# Patient Record
Sex: MALE | Race: WHITE | NOT HISPANIC OR LATINO | URBAN - NONMETROPOLITAN AREA
[De-identification: names, ages, dates, MRNs, and addresses within clinical notes are randomized per-mention and may not be internally consistent; named-entity substitution may affect disease eponyms.]

---

## 2018-09-19 RX ORDER — ALBUTEROL SULFATE 90 UG/1
2 AEROSOL, METERED RESPIRATORY (INHALATION) EVERY 4 HOURS PRN
COMMUNITY

## 2018-09-19 RX ORDER — HYDROCHLOROTHIAZIDE 12.5 MG/1
12.5 CAPSULE, GELATIN COATED ORAL DAILY
COMMUNITY
End: 2021-03-11

## 2018-09-19 RX ORDER — ACETAMINOPHEN 500 MG
500 TABLET ORAL EVERY 6 HOURS PRN
COMMUNITY

## 2018-09-26 ENCOUNTER — ANESTHESIA (OUTPATIENT)
Dept: GASTROENTEROLOGY | Facility: HOSPITAL | Age: 58
End: 2018-09-26

## 2018-09-26 ENCOUNTER — HOSPITAL ENCOUNTER (OUTPATIENT)
Facility: HOSPITAL | Age: 58
Setting detail: HOSPITAL OUTPATIENT SURGERY
Discharge: HOME | End: 2018-09-26
Attending: INTERNAL MEDICINE | Admitting: INTERNAL MEDICINE

## 2018-09-26 ENCOUNTER — ANESTHESIA EVENT (OUTPATIENT)
Dept: GASTROENTEROLOGY | Facility: HOSPITAL | Age: 58
End: 2018-09-26

## 2018-09-26 VITALS
DIASTOLIC BLOOD PRESSURE: 74 MMHG | HEART RATE: 88 BPM | HEIGHT: 69 IN | OXYGEN SATURATION: 95 % | RESPIRATION RATE: 18 BRPM | TEMPERATURE: 96.9 F | SYSTOLIC BLOOD PRESSURE: 110 MMHG | BODY MASS INDEX: 24.62 KG/M2 | WEIGHT: 166.23 LBS

## 2018-09-26 DIAGNOSIS — Z12.11 ENCOUNTER FOR SCREENING COLONOSCOPY: ICD-10-CM

## 2018-09-26 PROCEDURE — 88305 TISSUE EXAM BY PATHOLOGIST: CPT | Performed by: INTERNAL MEDICINE

## 2018-09-26 PROCEDURE — 88305 TISSUE EXAM BY PATHOLOGIST: CPT | Performed by: PATHOLOGY

## 2018-09-26 PROCEDURE — 25010000002 PROPOFOL 10 MG/ML EMULSION: Performed by: NURSE ANESTHETIST, CERTIFIED REGISTERED

## 2018-09-26 PROCEDURE — 25010000002 MIDAZOLAM PER 1 MG: Performed by: NURSE ANESTHETIST, CERTIFIED REGISTERED

## 2018-09-26 RX ORDER — PROMETHAZINE HYDROCHLORIDE 25 MG/1
25 TABLET ORAL ONCE AS NEEDED
Status: DISCONTINUED | OUTPATIENT
Start: 2018-09-26 | End: 2018-09-26 | Stop reason: HOSPADM

## 2018-09-26 RX ORDER — PROMETHAZINE HYDROCHLORIDE 25 MG/1
25 SUPPOSITORY RECTAL ONCE AS NEEDED
Status: DISCONTINUED | OUTPATIENT
Start: 2018-09-26 | End: 2018-09-26 | Stop reason: HOSPADM

## 2018-09-26 RX ORDER — ONDANSETRON 2 MG/ML
4 INJECTION INTRAMUSCULAR; INTRAVENOUS ONCE AS NEEDED
Status: DISCONTINUED | OUTPATIENT
Start: 2018-09-26 | End: 2018-09-26 | Stop reason: HOSPADM

## 2018-09-26 RX ORDER — MIDAZOLAM HYDROCHLORIDE 1 MG/ML
INJECTION INTRAMUSCULAR; INTRAVENOUS AS NEEDED
Status: DISCONTINUED | OUTPATIENT
Start: 2018-09-26 | End: 2018-09-26 | Stop reason: SURG

## 2018-09-26 RX ORDER — DEXAMETHASONE SODIUM PHOSPHATE 4 MG/ML
8 INJECTION, SOLUTION INTRA-ARTICULAR; INTRALESIONAL; INTRAMUSCULAR; INTRAVENOUS; SOFT TISSUE ONCE AS NEEDED
Status: DISCONTINUED | OUTPATIENT
Start: 2018-09-26 | End: 2018-09-26 | Stop reason: HOSPADM

## 2018-09-26 RX ORDER — PROMETHAZINE HYDROCHLORIDE 25 MG/ML
12.5 INJECTION, SOLUTION INTRAMUSCULAR; INTRAVENOUS ONCE AS NEEDED
Status: DISCONTINUED | OUTPATIENT
Start: 2018-09-26 | End: 2018-09-26 | Stop reason: HOSPADM

## 2018-09-26 RX ORDER — PROPOFOL 10 MG/ML
VIAL (ML) INTRAVENOUS AS NEEDED
Status: DISCONTINUED | OUTPATIENT
Start: 2018-09-26 | End: 2018-09-26 | Stop reason: SURG

## 2018-09-26 RX ORDER — DEXTROSE AND SODIUM CHLORIDE 5; .45 G/100ML; G/100ML
20 INJECTION, SOLUTION INTRAVENOUS CONTINUOUS
Status: DISCONTINUED | OUTPATIENT
Start: 2018-09-26 | End: 2018-09-26 | Stop reason: HOSPADM

## 2018-09-26 RX ADMIN — PROPOFOL 70 MG: 10 INJECTION, EMULSION INTRAVENOUS at 14:21

## 2018-09-26 RX ADMIN — DEXTROSE AND SODIUM CHLORIDE 20 ML/HR: 5; 450 INJECTION, SOLUTION INTRAVENOUS at 13:56

## 2018-09-26 RX ADMIN — MIDAZOLAM HYDROCHLORIDE 2 MG: 2 INJECTION, SOLUTION INTRAMUSCULAR; INTRAVENOUS at 14:09

## 2018-09-26 RX ADMIN — PROPOFOL 70 MG: 10 INJECTION, EMULSION INTRAVENOUS at 14:12

## 2018-09-26 RX ADMIN — PROPOFOL 70 MG: 10 INJECTION, EMULSION INTRAVENOUS at 14:17

## 2018-09-26 NOTE — ANESTHESIA POSTPROCEDURE EVALUATION
Patient: Jeff Werner    Procedure Summary     Date:  09/26/18 Room / Location:  [facility] ENDOSCOPY 2 / [facility] ENDOSCOPY    Anesthesia Start:  1411 Anesthesia Stop:  1431    Procedure:  COLONOSCOPY (N/A ) Diagnosis:       Encounter for screening colonoscopy      (Encounter for screening colonoscopy [Z12.11])    Surgeon:  Jovanny Landis DO Provider:  Yuko Flowers CRNA    Anesthesia Type:  MAC ASA Status:  2          Anesthesia Type: MAC  Last vitals  BP   147/82 (09/26/18 1350)   Temp   96.6 °F (35.9 °C) (09/26/18 1350)   Pulse   93 (09/26/18 1350)   Resp   16 (09/26/18 1350)     SpO2   97 % (09/26/18 1350)     Post Anesthesia Care and Evaluation    Patient location during evaluation: bedside  Level of consciousness: sleepy but conscious  Pain score: 0  Pain management: adequate  Airway patency: patent  Anesthetic complications: No anesthetic complications  PONV Status: none  Cardiovascular status: acceptable  Respiratory status: acceptable  Hydration status: acceptable

## 2018-09-26 NOTE — ANESTHESIA PREPROCEDURE EVALUATION
Anesthesia Evaluation     Patient summary reviewed and Nursing notes reviewed   NPO Solid Status: > 8 hours  NPO Liquid Status: > 6 hours           Airway   Mallampati: II  TM distance: >3 FB  Neck ROM: full  No difficulty expected  Dental - normal exam     Pulmonary - normal exam   (+) a smoker Current Abstained day of surgery, COPD, asthma,   Cardiovascular - normal exam    (+) hypertension well controlled less than 2 medications, hyperlipidemia,       Neuro/Psych  GI/Hepatic/Renal/Endo      Musculoskeletal     Abdominal  - normal exam   Substance History      OB/GYN          Other                        Anesthesia Plan    ASA 2     MAC     intravenous induction   Anesthetic plan, all risks, benefits, and alternatives have been provided, discussed and informed consent has been obtained with: patient.

## 2018-09-27 LAB
LAB AP CASE REPORT: NORMAL
PATH REPORT.FINAL DX SPEC: NORMAL
PATH REPORT.GROSS SPEC: NORMAL

## 2019-10-14 ENCOUNTER — TRANSCRIBE ORDERS (OUTPATIENT)
Dept: ORTHOPEDIC SURGERY | Facility: CLINIC | Age: 59
End: 2019-10-14

## 2019-10-14 DIAGNOSIS — M25.561 CHRONIC PAIN OF RIGHT KNEE: Primary | ICD-10-CM

## 2019-10-14 DIAGNOSIS — G89.29 CHRONIC PAIN OF RIGHT KNEE: Primary | ICD-10-CM

## 2019-10-28 DIAGNOSIS — M25.561 RIGHT KNEE PAIN, UNSPECIFIED CHRONICITY: Primary | ICD-10-CM

## 2019-10-29 ENCOUNTER — OFFICE VISIT (OUTPATIENT)
Dept: ORTHOPEDIC SURGERY | Facility: CLINIC | Age: 59
End: 2019-10-29

## 2019-10-29 VITALS — BODY MASS INDEX: 25.92 KG/M2 | HEIGHT: 69 IN | WEIGHT: 175 LBS

## 2019-10-29 DIAGNOSIS — M51.36 DEGENERATIVE DISC DISEASE, LUMBAR: ICD-10-CM

## 2019-10-29 DIAGNOSIS — M54.42 CHRONIC MIDLINE LOW BACK PAIN WITH LEFT-SIDED SCIATICA: ICD-10-CM

## 2019-10-29 DIAGNOSIS — M23.91 INTERNAL DERANGEMENT OF RIGHT KNEE: ICD-10-CM

## 2019-10-29 DIAGNOSIS — G89.29 CHRONIC PAIN OF RIGHT KNEE: Primary | ICD-10-CM

## 2019-10-29 DIAGNOSIS — G89.29 CHRONIC MIDLINE LOW BACK PAIN WITH LEFT-SIDED SCIATICA: ICD-10-CM

## 2019-10-29 DIAGNOSIS — M25.561 MECHANICAL PAIN OF RIGHT KNEE: ICD-10-CM

## 2019-10-29 DIAGNOSIS — R20.0 NUMBNESS AND TINGLING OF LEFT LEG: ICD-10-CM

## 2019-10-29 DIAGNOSIS — M25.561 CHRONIC PAIN OF RIGHT KNEE: Primary | ICD-10-CM

## 2019-10-29 DIAGNOSIS — R20.2 NUMBNESS AND TINGLING OF LEFT LEG: ICD-10-CM

## 2019-10-29 PROCEDURE — 99204 OFFICE O/P NEW MOD 45 MIN: CPT | Performed by: NURSE PRACTITIONER

## 2019-10-31 PROBLEM — M25.561 MECHANICAL PAIN OF RIGHT KNEE: Status: ACTIVE | Noted: 2019-10-31

## 2019-10-31 PROBLEM — M54.50 CHRONIC MIDLINE LOW BACK PAIN WITHOUT SCIATICA: Status: ACTIVE | Noted: 2019-10-31

## 2019-10-31 PROBLEM — G89.29 CHRONIC MIDLINE LOW BACK PAIN WITHOUT SCIATICA: Status: ACTIVE | Noted: 2019-10-31

## 2019-10-31 PROBLEM — R20.2 NUMBNESS AND TINGLING OF LEFT LEG: Status: ACTIVE | Noted: 2019-10-31

## 2019-10-31 PROBLEM — M23.91 INTERNAL DERANGEMENT OF RIGHT KNEE: Status: ACTIVE | Noted: 2019-10-31

## 2019-10-31 PROBLEM — R20.0 NUMBNESS AND TINGLING OF LEFT LEG: Status: ACTIVE | Noted: 2019-10-31

## 2019-11-25 ENCOUNTER — TELEPHONE (OUTPATIENT)
Dept: ORTHOPEDIC SURGERY | Facility: CLINIC | Age: 59
End: 2019-11-25

## 2019-11-25 DIAGNOSIS — R20.2 NUMBNESS AND TINGLING OF LEFT LEG: ICD-10-CM

## 2019-11-25 DIAGNOSIS — M25.561 RIGHT KNEE PAIN, UNSPECIFIED CHRONICITY: ICD-10-CM

## 2019-11-25 DIAGNOSIS — R20.0 NUMBNESS AND TINGLING OF LEFT LEG: ICD-10-CM

## 2019-11-25 DIAGNOSIS — M51.36 DEGENERATIVE DISC DISEASE, LUMBAR: ICD-10-CM

## 2019-11-25 DIAGNOSIS — M54.42 CHRONIC MIDLINE LOW BACK PAIN WITH LEFT-SIDED SCIATICA: ICD-10-CM

## 2019-11-25 DIAGNOSIS — G89.29 CHRONIC PAIN OF RIGHT KNEE: ICD-10-CM

## 2019-11-25 DIAGNOSIS — M25.561 CHRONIC PAIN OF RIGHT KNEE: ICD-10-CM

## 2019-11-25 DIAGNOSIS — G89.29 CHRONIC MIDLINE LOW BACK PAIN WITH LEFT-SIDED SCIATICA: ICD-10-CM

## 2019-11-25 DIAGNOSIS — M25.561 MECHANICAL PAIN OF RIGHT KNEE: ICD-10-CM

## 2019-11-25 DIAGNOSIS — M23.91 INTERNAL DERANGEMENT OF RIGHT KNEE: Primary | ICD-10-CM

## 2019-11-25 NOTE — TELEPHONE ENCOUNTER
----- Message from CRISTOBAL Bliss sent at 11/25/2019 11:17 AM CST -----  Regarding: physcial therapy  Please check with patient and see if he wants to be referred to physical therapy in regards to his right knee and/or his chronic low back pain.  His insurance has denied the MRIs but typically they will approve them when I resubmit them if he has done some therapy.  Thanks.

## 2019-12-10 ENCOUNTER — HOSPITAL ENCOUNTER (OUTPATIENT)
Dept: PHYSICAL THERAPY | Facility: HOSPITAL | Age: 59
Setting detail: THERAPIES SERIES
Discharge: HOME | End: 2019-12-10

## 2019-12-10 DIAGNOSIS — M25.561 CHRONIC PAIN OF RIGHT KNEE: ICD-10-CM

## 2019-12-10 DIAGNOSIS — M25.561 RIGHT KNEE PAIN, UNSPECIFIED CHRONICITY: ICD-10-CM

## 2019-12-10 DIAGNOSIS — M23.91 INTERNAL DERANGEMENT OF RIGHT KNEE: ICD-10-CM

## 2019-12-10 DIAGNOSIS — M25.561 MECHANICAL PAIN OF RIGHT KNEE: ICD-10-CM

## 2019-12-10 DIAGNOSIS — G89.29 CHRONIC PAIN OF RIGHT KNEE: ICD-10-CM

## 2019-12-10 DIAGNOSIS — R20.2 NUMBNESS AND TINGLING OF LEFT LEG: ICD-10-CM

## 2019-12-10 DIAGNOSIS — R20.0 NUMBNESS AND TINGLING OF LEFT LEG: ICD-10-CM

## 2019-12-10 DIAGNOSIS — M54.42 CHRONIC MIDLINE LOW BACK PAIN WITH LEFT-SIDED SCIATICA: ICD-10-CM

## 2019-12-10 DIAGNOSIS — G89.29 CHRONIC MIDLINE LOW BACK PAIN WITH LEFT-SIDED SCIATICA: ICD-10-CM

## 2019-12-10 DIAGNOSIS — M51.36 DEGENERATIVE DISC DISEASE, LUMBAR: ICD-10-CM

## 2019-12-10 PROCEDURE — 97161 PT EVAL LOW COMPLEX 20 MIN: CPT | Performed by: PHYSICAL THERAPIST

## 2019-12-10 NOTE — THERAPY EVALUATION
Outpatient Physical Therapy Ortho Initial Evaluation  [facility]     Patient Name: Jeff Werner  : 1960  MRN: [id number]  Today's Date: 12/10/2019      Visit Date: 12/10/2019  Visit   Return to MD: [facility]  Re-cert date: 19  Patient Active Problem List   Diagnosis   • Right knee pain   • Degenerative disc disease, lumbar   • Internal derangement of right knee   • Chronic midline low back pain without sciatica   • Mechanical pain of right knee   • Numbness and tingling of left leg        Past Medical History:   Diagnosis Date   • Arthritis of back    • Asthma    • COPD (chronic obstructive pulmonary disease) (CMS/HCC)    • Degenerative disc disease, lumbar    • Elevated cholesterol    • Hypertension    • Lumbosacral disc disease     history of disc hernations in the lumbar spine        Past Surgical History:   Procedure Laterality Date   • COLONOSCOPY N/A 2018    Procedure: COLONOSCOPY;  Surgeon: Jovanny Landis DO;  Location: [facility] ENDOSCOPY;  Service: Gastroenterology       Visit Dx:     ICD-10-CM ICD-9-CM   1. Internal derangement of right knee M23.91 717.9   2. Degenerative disc disease, lumbar M51.36 722.52   3. Chronic pain of right knee M25.561 719.46    G89.29 338.29   4. Chronic midline low back pain with left-sided sciatica M54.42 724.2    G89.29 724.3     338.29   5. Mechanical pain of right knee M25.561 719.46   6. Numbness and tingling of left leg R20.0 782.0    R20.2    7. Right knee pain, unspecified chronicity M25.561 719.46       Medications (Admitted on 12/10/2019)     acetaminophen (TYLENOL) 500 MG tablet     albuterol (PROVENTIL HFA;VENTOLIN HFA) 108 (90 Base) MCG/ACT inhaler     hydrochlorothiazide (MICROZIDE) 12.5 MG capsule      Allergies       CarvedilolAnaphylaxis   LosartanAnaphylaxis   LisinoprilAngioedema           PT Ortho     Row Name 12/10/19 1100       Subjective Comments    Subjective Comments  60 yo male with right knee pain for the past 5 months.  Sudden onset standing up one day and felt a pop in that knee. Had popping/crepitus for 2 weeks, unable to squat or bend down onto the floor at the time. Improving since onset. 2 steroid injections given over a short period of time (2 weeks). Had a bad reaction to multiple steroid injections given over a short period of time. X-ray evidence of osteophyte along superior patella region and loose calcified body along posterior compartment of R knee. Aggravating factors: rising after prolonged sitting, extensive walking, squatting, working on knees at work (works at [employer]), climbing stairs-down>up; Easing factors: tylenol   -BS       Precautions and Contraindications    Precautions/Limitations  no known precautions/limitations  -BS       Subjective Pain    Able to rate subjective pain?  yes  -BS    Pre-Treatment Pain Level  1  -BS    Subjective Pain Comment  1-9/10 intermittent R knee pain; deep squat performed with 4/10 R knee pain noted.  -BS       Posture/Observations    Posture/Observations Comments  -TTP along R knee, no edema noted, no varus/valgus deformity  -BS       Special Tests/Palpation    Special Tests/Palpation  Knee  -BS       Knee Special Tests    Anterior drawer (ACL lesion)  Right:;Negative  -BS    Posterior drawer (PCL lesion)  Right:;Negative  -BS    Valgus stress (MCL lesion)  Right:;Negative  -BS    Varus stress (LCL lesion)  Right:;Negative  -BS    Apley’s compression test (meniscal lesion vs OA)  Right:;Positive  -BS    Jose’s test (meniscal lesion)  Right:;Negative  -BS    Patellar grind test (chondromalacia patella)  Right:;Negative  -BS       General ROM    GENERAL ROM COMMENTS  AROM: R knee 0-140 deg  L knee 0-140 deg  -BS       MMT (Manual Muscle Testing)    General MMT Comments  MMT: B LE's 5/5 except 4+/5 B hip ext  -BS       Sensation    Light Touch  Partial deficits in the LLE paresthesia along L lateral thigh  -BS       Flexibility    Flexibility Tested?  Lower Extremity  -BS        Lower Extremity Flexibility    Hamstrings  Bilateral:;Moderately limited R  52 deg, L 58 deg  -BS    Hip Flexors  Bilateral:;WNL  -BS    Quadriceps  Bilateral:;Mildly limited  -BS    ITB  Left:;Severely limited;Right:;WNL  -BS      User Key  (r) = Recorded By, (t) = Taken By, (c) = Cosigned By    Initials Name Provider Type    Aj Moreno, PT Physical Therapist                            PT OP Goals     Row Name 12/10/19 1100          Time Calculation    PT Goal Re-Cert Due Date  12/31/19  -BS       User Key  (r) = Recorded By, (t) = Taken By, (c) = Cosigned By    Initials Name Provider Type    Aj Moreno, PT Physical Therapist          PT Assessment/Plan     Row Name 12/10/19 1111          PT Assessment    Functional Limitations  Impaired gait;Performance in work activities  -BS     Impairments  Pain;Muscle strength  -BS     Assessment Comments  Acute R knee pain due to suspected meniscal tear. Positive special test Apley compression test finding and popping/crepitus with wt bearing. Instability noted with eccentric SLR test.   -BS     Please refer to paper survey for additional self-reported information  Yes  -BS     Rehab Potential  Good  -BS     Patient/caregiver participated in establishment of treatment plan and goals  Yes  -BS     Patient would benefit from skilled therapy intervention  Yes  -BS        PT Plan    PT Frequency  2x/week  -BS     Predicted Duration of Therapy Intervention (Therapy Eval)  2-3 weeks  -BS     Planned CPT's?  PT EVAL LOW COMPLEXITY: 35439;PT THER PROC EA 15 MIN: 46563;PT MANUAL THERAPY EA 15 MIN: 79414;PT RE-EVAL: 97219;PT ELECTRICAL STIM UNATTEND: ;PT HOT/COLD PACK WC NONMCARE: 46153;PT THER SUPP EA 15 MIN  -BS     Physical Therapy Interventions (Optional Details)  balance training;home exercise program;joint mobilization;manual therapy techniques;modalities;patient/family education;ROM (Range of Motion);stair training;strengthening;stretching  -BS     PT Plan  Comments  Address glut max weakness, eccentric loading to R quads. Address hamstring and quad tightness.    -BS       User Key  (r) = Recorded By, (t) = Taken By, (c) = Cosigned By    Initials Name Provider Type    Aj Moreno, PT Physical Therapist            OP Exercises     Row Name 12/10/19 1100             Subjective Comments    Subjective Comments  58 yo male with right knee pain for the past 5 months. Sudden onset standing up one day and felt a pop in that knee. Had popping/crepitus for 2 weeks, unable to squat or bend down onto the floor at the time. Improving since onset. 2 steroid injections given over a short period of time (2 weeks). Had a bad reaction to multiple steroid injections given over a short period of time. X-ray evidence of osteophyte along superior patella region and loose calcified body along posterior compartment of R knee. Aggravating factors: rising after prolonged sitting, extensive walking, squatting, working on knees at work (works at [employer]), climbing stairs-down>up; Easing factors: tylenol   -BS         Subjective Pain    Able to rate subjective pain?  yes  -BS      Pre-Treatment Pain Level  1  -BS      Post-Treatment Pain Level  1  -BS      Subjective Pain Comment  1-9/10 intermittent R knee pain; deep squat performed with 4/10 R knee pain noted.  -BS        User Key  (r) = Recorded By, (t) = Taken By, (c) = Cosigned By    Initials Name Provider Type    Aj Moreno, PT Physical Therapist                        Outcome Measure Options: Lower Extremity Functional Scale (LEFS)  Lower Extremity Functional Index  Any of your usual work, housework or school activities: A little bit of difficulty  Your usual hobbies, recreational or sporting activities: No difficulty  Getting into or out of the bath: No difficulty  Walking between rooms: No difficulty  Putting on your shoes or socks: A little bit of difficulty  Squatting: Moderate difficulty  Lifting an object, like a bag of  groceries from the floor: A little bit of difficulty  Performing light activities around your home: No difficulty  Performing heavy activities around your home: Moderate difficulty  Getting into or out of a car: No difficulty  Walking 2 blocks: Quite a bit of difficulty  Walking a mile: Extreme difficulty or unable to perform activity  Going up or down 10 stairs (about 1 flight of stairs): Moderate difficulty  Standing for 1 hour: Quite a bit of difficulty  Sitting for 1 hour: Moderate difficulty  Running on even ground: Extreme difficulty or unable to perform activity  Running on uneven ground: Extreme difficulty or unable to perform activity  Making sharp turns while running fast: Extreme difficulty or unable to perform activity  Hopping: Extreme difficulty or unable to perform activity  Rolling over in bed: No difficulty  Total: 43      Time Calculation:     Start Time: 1111  Stop Time: 1140  Time Calculation (min): 29 min  Total Timed Code Minutes- PT: 29 minute(s)     Therapy Charges for Today     Code Description Service Date Service Provider Modifiers Qty    39316375548 HC PT EVAL LOW COMPLEXITY 2 12/10/2019 Aj Mayes, PT GP 1          PT G-Codes  Outcome Measure Options: Lower Extremity Functional Scale (LEFS)  Total: 43         Aj Mayes, PT  12/10/2019

## 2019-12-18 ENCOUNTER — HOSPITAL ENCOUNTER (OUTPATIENT)
Dept: PHYSICAL THERAPY | Facility: HOSPITAL | Age: 59
Setting detail: THERAPIES SERIES
Discharge: HOME | End: 2019-12-18

## 2019-12-18 DIAGNOSIS — M23.91 INTERNAL DERANGEMENT OF RIGHT KNEE: Primary | ICD-10-CM

## 2019-12-18 DIAGNOSIS — M25.561 CHRONIC PAIN OF RIGHT KNEE: ICD-10-CM

## 2019-12-18 DIAGNOSIS — G89.29 CHRONIC PAIN OF RIGHT KNEE: ICD-10-CM

## 2019-12-18 PROCEDURE — 97110 THERAPEUTIC EXERCISES: CPT | Performed by: PHYSICAL THERAPIST

## 2019-12-18 NOTE — THERAPY TREATMENT NOTE
Outpatient Physical Therapy Ortho Treatment Note  [facility]     Patient Name: Jeff Werner  : 1960  MRN: [id number]  Today's Date: 2019      Visit Date: 2019  Visit 2/2 (7 authorized)  Return to MD: [facility]  Re-cert date: 19    Visit Dx:    ICD-10-CM ICD-9-CM   1. Internal derangement of right knee M23.91 717.9   2. Chronic pain of right knee M25.561 719.46    G89.29 338.29       Patient Active Problem List   Diagnosis   • Right knee pain   • Degenerative disc disease, lumbar   • Internal derangement of right knee   • Chronic midline low back pain without sciatica   • Mechanical pain of right knee   • Numbness and tingling of left leg        Past Medical History:   Diagnosis Date   • Arthritis of back    • Asthma    • COPD (chronic obstructive pulmonary disease) (CMS/HCC)    • Degenerative disc disease, lumbar    • Elevated cholesterol    • Hypertension    • Lumbosacral disc disease     history of disc hernations in the lumbar spine        Past Surgical History:   Procedure Laterality Date   • COLONOSCOPY N/A 2018    Procedure: COLONOSCOPY;  Surgeon: Jovanny Landis DO;  Location: [facility] ENDOSCOPY;  Service: Gastroenterology       PT Ortho     Row Name 19 1300       Precautions and Contraindications    Precautions/Limitations  no known precautions/limitations  -BS       Subjective Pain    Able to rate subjective pain?  yes  -BS    Pre-Treatment Pain Level  2  -BS    Post-Treatment Pain Level  4  -BS      User Key  (r) = Recorded By, (t) = Taken By, (c) = Cosigned By    Initials Name Provider Type    Aj Moreno, PT Physical Therapist                      PT Assessment/Plan     Row Name 19 1302          PT Assessment    Assessment Comments  Good tolerance to quad stability training. Slight increase in R knee pain post tx.  -BS        PT Plan    PT Frequency  2x/week  -BS     Predicted Duration of Therapy Intervention (Therapy Eval)  2-3 weeks  -BS     PT  "Plan Comments  attempt ball toss w/ SLS.  -BS       User Key  (r) = Recorded By, (t) = Taken By, (c) = Cosigned By    Initials Name Provider Type    BS Aj Mayes, PT Physical Therapist            OP Exercises     Row Name 12/18/19 1300             Subjective Comments    Subjective Comments  pt reports irritated for a few days after the PT consult.   -BS         Subjective Pain    Able to rate subjective pain?  yes  -BS      Pre-Treatment Pain Level  2  -BS      Post-Treatment Pain Level  4  -BS         Exercise 1    Exercise Name 1  Pro II, level 3  -BS      Time 1  10'  -BS         Exercise 2    Exercise Name 2  standing HS S  -BS      Sets 2  1  -BS      Reps 2  2  -BS      Time 2  30\" hold  -BS      Additional Comments  R only  -BS         Exercise 3    Exercise Name 3  step down, 4\"  -BS      Sets 3  1  -BS      Reps 3  10  -BS         Exercise 4    Exercise Name 4  SLS on firm surface  -BS      Sets 4  7-10\" on R, >15\" on L  -BS         Exercise 5    Exercise Name 5  standing HR/TR  -BS      Sets 5  1  -BS      Reps 5  20  -BS         Exercise 6    Exercise Name 6  standing quad S  -BS      Sets 6  1  -BS      Reps 6  2  -BS      Time 6  30\" hold  -BS      Additional Comments  R only  -BS         Exercise 7    Exercise Name 7  R SLR  -BS      Sets 7  2  -BS      Reps 7  10  -BS      Time 7  5\" hold  -BS         Exercise 8    Exercise Name 8  bridges w/ iso b hip add  -BS      Sets 8  2  -BS      Reps 8  10  -BS        User Key  (r) = Recorded By, (t) = Taken By, (c) = Cosigned By    Initials Name Provider Type    Aj Moreno, PT Physical Therapist                       PT OP Goals     Row Name 12/18/19 1300          PT Short Term Goals    STG Date to Achieve  12/31/19  -BS     STG 1  Able to perform 10 right SLR's with good eccentric control of R quads  -BS     STG 1 Progress  Ongoing  -BS     STG 2  Improve R hip extension MMT to 5/5  -BS     STG 2 Progress  Ongoing  -BS     STG 3  Able to stand for " prolonged periods at work and descend stairs with minimal to no R knee pain  -BS     STG 3 Progress  Ongoing  -BS     STG 4  Improve right hamstrings flexibility to >/=70 deg with 90/90 SLR test  -BS     STG 4 Progress  New  -BS        Time Calculation    PT Goal Re-Cert Due Date  12/31/19  -BS       User Key  (r) = Recorded By, (t) = Taken By, (c) = Cosigned By    Initials Name Provider Type    Aj Moreno, PT Physical Therapist          Therapy Education  Education Details: HEP: see flow sheet  Given: HEP  Program: New  How Provided: Verbal, Demonstration, Written  Provided to: Patient  Level of Understanding: Verbalized, Demonstrated              Time Calculation:   Start Time: 1302  Stop Time: 1341  Time Calculation (min): 39 min  Total Timed Code Minutes- PT: 39 minute(s)  Therapy Charges for Today     Code Description Service Date Service Provider Modifiers Qty    24480099273 HC PT THER PROC EA 15 MIN 12/18/2019 Aj Mayes, PT GP 3                    Aj Mayes, PT  12/18/2019

## 2019-12-31 ENCOUNTER — HOSPITAL ENCOUNTER (OUTPATIENT)
Dept: PHYSICAL THERAPY | Facility: HOSPITAL | Age: 59
Setting detail: THERAPIES SERIES
Discharge: HOME | End: 2019-12-31

## 2019-12-31 DIAGNOSIS — G89.29 CHRONIC PAIN OF RIGHT KNEE: ICD-10-CM

## 2019-12-31 DIAGNOSIS — M23.91 INTERNAL DERANGEMENT OF RIGHT KNEE: Primary | ICD-10-CM

## 2019-12-31 DIAGNOSIS — M25.561 CHRONIC PAIN OF RIGHT KNEE: ICD-10-CM

## 2019-12-31 PROCEDURE — 97110 THERAPEUTIC EXERCISES: CPT

## 2020-01-07 ENCOUNTER — HOSPITAL ENCOUNTER (OUTPATIENT)
Dept: PHYSICAL THERAPY | Facility: HOSPITAL | Age: 60
Setting detail: THERAPIES SERIES
Discharge: HOME | End: 2020-01-07

## 2020-01-07 DIAGNOSIS — M23.91 INTERNAL DERANGEMENT OF RIGHT KNEE: Primary | ICD-10-CM

## 2020-01-07 DIAGNOSIS — G89.29 CHRONIC PAIN OF RIGHT KNEE: ICD-10-CM

## 2020-01-07 DIAGNOSIS — M25.561 CHRONIC PAIN OF RIGHT KNEE: ICD-10-CM

## 2020-01-07 PROCEDURE — 97110 THERAPEUTIC EXERCISES: CPT | Performed by: PHYSICAL THERAPIST

## 2020-01-07 NOTE — THERAPY PROGRESS REPORT/RE-CERT
Outpatient Physical Therapy Ortho Progress Note  [facility]     Patient Name: Jeff Werner  : 1960  MRN: [id number]  Today's Date: 2020      Visit Date: 2020  Subjective Improvement:        10%  Visit Number:                            Recert Date:                            20  MD Visit:                                  ?  Total Approved Visits:             6 visits 24 units from 19  To 20  Patient Active Problem List   Diagnosis   • Right knee pain   • Degenerative disc disease, lumbar   • Internal derangement of right knee   • Chronic midline low back pain without sciatica   • Mechanical pain of right knee   • Numbness and tingling of left leg        Past Medical History:   Diagnosis Date   • Arthritis of back    • Asthma    • COPD (chronic obstructive pulmonary disease) (CMS/HCC)    • Degenerative disc disease, lumbar    • Elevated cholesterol    • Hypertension    • Lumbosacral disc disease     history of disc hernations in the lumbar spine        Past Surgical History:   Procedure Laterality Date   • COLONOSCOPY N/A 2018    Procedure: COLONOSCOPY;  Surgeon: Jovanny Landis DO;  Location: [facility] ENDOSCOPY;  Service: Gastroenterology       Visit Dx:     ICD-10-CM ICD-9-CM   1. Internal derangement of right knee M23.91 717.9   2. Chronic pain of right knee M25.561 719.46    G89.29 338.29             PT Ortho     Row Name 20 0900       MMT (Manual Muscle Testing)    General MMT Comments  R hip ext 5/5 L hip ext 5/5   -BS       Lower Extremity Flexibility    Hamstrings  Right:;Moderately limited R 60 deg  -BS    Row Name 20 0851       Subjective Comments    Subjective Comments  10% improvement overall, no real pain mostly pops in the knee daily. Able to work in a kneeling position a bit better since starting PT.   -BS       Precautions and Contraindications    Precautions/Limitations  no known precautions/limitations  -BS       Subjective Pain     Able to rate subjective pain?  yes  -BS    Pre-Treatment Pain Level  0  -BS    Post-Treatment Pain Level  0  -BS      User Key  (r) = Recorded By, (t) = Taken By, (c) = Cosigned By    Initials Name Provider Type    Aj Moreno, PT Physical Therapist                      Therapy Education  Education Details: hip hikes, fwd al HOWE  Given: HEP  Program: New  How Provided: Verbal, Demonstration  Provided to: Patient  Level of Understanding: Verbalized, Demonstrated, Teach back education performed     PT OP Goals     Row Name 01/07/20 0851          PT Short Term Goals    STG Date to Achieve  12/31/19  -BS     STG 1  Able to perform 10 right SLR's with good eccentric control of R quads  -BS     STG 1 Progress  Met  -BS     STG 2  Improve R hip extension MMT to 5/5  -BS     STG 2 Progress  Met  -BS     STG 3  Able to stand for prolonged periods at work and descend stairs with minimal to no R knee pain  -BS     STG 3 Progress  Ongoing  -BS     STG 4  Improve right hamstrings flexibility to >/=70 deg with 90/90 SLR test  -BS     STG 4 Progress  Ongoing;Progressing  -BS        Time Calculation    PT Goal Re-Cert Due Date  01/28/20  -BS       User Key  (r) = Recorded By, (t) = Taken By, (c) = Cosigned By    Initials Name Provider Type    Aj Moreno, PT Physical Therapist          PT Assessment/Plan     Row Name 01/07/20 0851          PT Assessment    Functional Limitations  Impaired gait;Performance in work activities  -BS     Impairments  Pain;Muscle strength  -BS     Assessment Comments  Minimal gains toward R knee goals due partly by poor compliance with HEP and attendance.  -BS     Please refer to paper survey for additional self-reported information  Yes  -BS     Rehab Potential  Good  -BS     Patient/caregiver participated in establishment of treatment plan and goals  Yes  -BS     Patient would benefit from skilled therapy intervention  Yes  -BS        PT Plan    PT Frequency  2x/week  -BS     Predicted  "Duration of Therapy Intervention (Therapy Eval)  3 weeks  -BS     Planned CPT's?  PT RE-EVAL: 30262;PT THER PROC EA 15 MIN: 51580;PT THER ACT EA 15 MIN: 26090;PT MANUAL THERAPY EA 15 MIN: 63597;PT NEUROMUSC RE-EDUCATION EA 15 MIN: 41979;PT ELECTRICAL STIM UNATTEND: ;PT HOT/COLD PACK WC NONMCARE: 26835;PT THER SUPP EA 15 MIN  -BS     Physical Therapy Interventions (Optional Details)  balance training;home exercise program;joint mobilization;manual therapy techniques;modalities;neuromuscular re-education;patient/family education;ROM (Range of Motion);strengthening;stretching  -BS     PT Plan Comments  further address eccentric control of knee and ankle stability.   -BS       User Key  (r) = Recorded By, (t) = Taken By, (c) = Cosigned By    Initials Name Provider Type    Aj Moreno, PT Physical Therapist            OP Exercises     Row Name 01/07/20 0851             Subjective Comments    Subjective Comments  10% improvement overall, no real pain mostly pops in the knee daily. Able to work in a kneeling position a bit better since starting PT.   -BS         Subjective Pain    Able to rate subjective pain?  yes  -BS      Pre-Treatment Pain Level  0  -BS      Post-Treatment Pain Level  0  -BS         Exercise 1    Exercise Name 1  Pro II level 3 seat 12  -BS      Time 1  10 min  -BS         Exercise 2    Exercise Name 2  Incline stretch  -BS      Reps 2  3  -BS      Time 2  30  -BS         Exercise 3    Exercise Name 3  R HS stretch standing  -BS      Reps 3  3  -BS      Time 3  30  -BS         Exercise 4    Exercise Name 4  eccentric SLR's  -BS      Sets 4  1  -BS      Reps 4  10  -BS      Additional Comments  R only  -BS         Exercise 5    Exercise Name 5  SLS  -BS      Reps 5  3  -BS      Time 5  15 sec  -BS         Exercise 6    Exercise Name 6  eccentric step downs, 6\"  -BS      Sets 6  1  -BS      Reps 6  10  -BS         Exercise 7    Exercise Name 7  hip hikes, 6\"  -BS      Sets 7  2  -BS      Reps " 7  10  -BS        User Key  (r) = Recorded By, (t) = Taken By, (c) = Cosigned By    Initials Name Provider Type    Aj Moreno, PT Physical Therapist                        Outcome Measure Options: Lower Extremity Functional Scale (LEFS)  Lower Extremity Functional Index  Any of your usual work, housework or school activities: A little bit of difficulty  Your usual hobbies, recreational or sporting activities: Moderate difficulty  Getting into or out of the bath: No difficulty  Walking between rooms: A little bit of difficulty  Putting on your shoes or socks: A little bit of difficulty  Squatting: Quite a bit of difficulty  Lifting an object, like a bag of groceries from the floor: A little bit of difficulty  Performing light activities around your home: A little bit of difficulty  Performing heavy activities around your home: Moderate difficulty  Getting into or out of a car: No difficulty  Walking 2 blocks: Quite a bit of difficulty  Walking a mile: Extreme difficulty or unable to perform activity  Going up or down 10 stairs (about 1 flight of stairs): Moderate difficulty  Standing for 1 hour: Moderate difficulty  Sitting for 1 hour: Moderate difficulty  Running on even ground: Extreme difficulty or unable to perform activity  Running on uneven ground: Extreme difficulty or unable to perform activity  Making sharp turns while running fast: Extreme difficulty or unable to perform activity  Hopping: Quite a bit of difficulty  Rolling over in bed: No difficulty  Total: 40      Time Calculation:     Start Time: 0851  Stop Time: 0930  Time Calculation (min): 39 min  Total Timed Code Minutes- PT: 39 minute(s)     Therapy Charges for Today     Code Description Service Date Service Provider Modifiers Qty    36193583181 HC PT THER PROC EA 15 MIN 1/7/2020 Aj Mayes, PT GP 3          PT G-Codes  Outcome Measure Options: Lower Extremity Functional Scale (LEFS)  Total: 40         Aj Mayes, PT  1/7/2020

## 2020-01-08 ENCOUNTER — HOSPITAL ENCOUNTER (OUTPATIENT)
Dept: PHYSICAL THERAPY | Facility: HOSPITAL | Age: 60
Setting detail: THERAPIES SERIES
Discharge: HOME | End: 2020-01-08

## 2020-01-08 DIAGNOSIS — G89.29 CHRONIC PAIN OF RIGHT KNEE: ICD-10-CM

## 2020-01-08 DIAGNOSIS — M23.91 INTERNAL DERANGEMENT OF RIGHT KNEE: Primary | ICD-10-CM

## 2020-01-08 DIAGNOSIS — M25.561 CHRONIC PAIN OF RIGHT KNEE: ICD-10-CM

## 2020-01-08 PROCEDURE — 97110 THERAPEUTIC EXERCISES: CPT

## 2020-01-08 NOTE — THERAPY TREATMENT NOTE
Outpatient Physical Therapy Ortho Treatment Note  [facility]     Patient Name: Jeff Werner  : 1960  MRN: [id number]  Today's Date: 2020      Visit Date: 2020    Visit Dx:    ICD-10-CM ICD-9-CM   1. Internal derangement of right knee M23.91 717.9   2. Chronic pain of right knee M25.561 719.46    G89.29 338.29       Patient Active Problem List   Diagnosis   • Right knee pain   • Degenerative disc disease, lumbar   • Internal derangement of right knee   • Chronic midline low back pain without sciatica   • Mechanical pain of right knee   • Numbness and tingling of left leg        Past Medical History:   Diagnosis Date   • Arthritis of back    • Asthma    • COPD (chronic obstructive pulmonary disease) (CMS/HCC)    • Degenerative disc disease, lumbar    • Elevated cholesterol    • Hypertension    • Lumbosacral disc disease     history of disc hernations in the lumbar spine        Past Surgical History:   Procedure Laterality Date   • COLONOSCOPY N/A 2018    Procedure: COLONOSCOPY;  Surgeon: Jovanny Landis DO;  Location: [facility] ENDOSCOPY;  Service: Gastroenterology       PT Ortho     Row Name 20 0800       Subjective Comments    Subjective Comments  Pt denies pain this date.  -TL       Precautions and Contraindications    Precautions/Limitations  no known precautions/limitations  -TL       Subjective Pain    Able to rate subjective pain?  yes  -TL    Pre-Treatment Pain Level  0  -TL    Post-Treatment Pain Level  0  -TL      User Key  (r) = Recorded By, (t) = Taken By, (c) = Cosigned By    Initials Name Provider Type    TL Ines Akins PTA Physical Therapy Assistant                      PT Assessment/Plan     Row Name 20 0900          PT Assessment    Assessment Comments  Pt denies pain . Pt tight in hamstrings and ITband . Pt tolerated new sit to stands with ball well and SLS stance on airex pad with balance issues. Pt has met short termg oals 1-3.  -TL        PT  Plan    PT Frequency  2x/week  -TL     Predicted Duration of Therapy Intervention (Therapy Eval)  3 weeks  -TL     PT Plan Comments  stretch ITB next visit, add increase reps with wall squats  -TL       User Key  (r) = Recorded By, (t) = Taken By, (c) = Cosigned By    Initials Name Provider Type    TL Ines Akins PTA Physical Therapy Assistant            OP Exercises     Row Name 01/08/20 0800             Subjective Comments    Subjective Comments  Pt denies pain this date.  -TL         Subjective Pain    Able to rate subjective pain?  yes  -TL      Pre-Treatment Pain Level  0  -TL      Post-Treatment Pain Level  0  -TL         Exercise 1    Exercise Name 1  pro ll level 4 seat 12  -TL      Time 1  10 mins  -TL         Exercise 2    Exercise Name 2  Incline stretch  -TL      Reps 2  2  -TL      Time 2  30 sec hold  -TL         Exercise 3    Exercise Name 3  both St Ham S  -TL      Reps 3  3  -TL      Time 3  30 sec hold  -TL         Exercise 4    Exercise Name 4  step down ecc 6'  -TL      Sets 4  2  -TL      Reps 4  10  -TL         Exercise 5    Exercise Name 5  step up lateral 6'  -TL      Sets 5  2  -TL      Reps 5  10  -TL         Exercise 6    Exercise Name 6  wall squats  -TL      Sets 6  1  -TL      Reps 6  10  -TL         Exercise 7    Exercise Name 7  sit to stands with hip add using ball  -TL      Sets 7  2  -TL      Reps 7  10  -TL         Exercise 8    Exercise Name 8  SLS on airex  -TL      Reps 8  5 reps  -TL      Additional Comments  7-11 sec hold  -TL         Exercise 9    Exercise Name 9  supine ham S manual right leg only  -TL      Reps 9  3  -TL      Time 9  30 sec hold  -TL        User Key  (r) = Recorded By, (t) = Taken By, (c) = Cosigned By    Initials Name Provider Type    TL Ines Akins PTA Physical Therapy Assistant                       PT OP Goals     Row Name 01/08/20 0800          PT Short Term Goals    STG Date to Achieve  12/31/19  -TL     STG 1  Able to perform 10 right  SLR's with good eccentric control of R quads  -TL     STG 1 Progress  Met  -TL     STG 2  Improve R hip extension MMT to 5/5  -TL     STG 2 Progress  Met  -TL     STG 3  Able to stand for prolonged periods at work and descend stairs with minimal to no R knee pain  -TL     STG 3 Progress  Met  -TL     STG 4  Improve right hamstrings flexibility to >/=70 deg with 90/90 SLR test  -TL     STG 4 Progress  Ongoing;Progressing  -TL     STG 5  Able to kneel at work for extended periods with minimal to no R knee pain  -TL     STG 5 Progress  New  -TL        Time Calculation    PT Goal Re-Cert Due Date  01/28/20  -TL       User Key  (r) = Recorded By, (t) = Taken By, (c) = Cosigned By    Initials Name Provider Type    Ines Giraldo PTA Physical Therapy Assistant          Therapy Education  Given: HEP, Symptoms/condition management  Program: Reinforced  How Provided: Verbal, Demonstration  Provided to: Patient  Level of Understanding: Teach back education performed, Verbalized, Demonstrated              Time Calculation:   Start Time: 0846  Stop Time: 0931  Time Calculation (min): 45 min  Total Timed Code Minutes- PT: 45 minute(s)  Therapy Charges for Today     Code Description Service Date Service Provider Modifiers Qty    66406736200 HC PT THER PROC EA 15 MIN 1/8/2020 Ines Akins PTA GP 3                    Ines Akins PTA  1/8/2020

## 2020-01-14 ENCOUNTER — HOSPITAL ENCOUNTER (OUTPATIENT)
Dept: PHYSICAL THERAPY | Facility: HOSPITAL | Age: 60
Setting detail: THERAPIES SERIES
End: 2020-01-14

## 2020-01-15 ENCOUNTER — APPOINTMENT (OUTPATIENT)
Dept: PHYSICAL THERAPY | Facility: HOSPITAL | Age: 60
End: 2020-01-15

## 2020-01-21 ENCOUNTER — HOSPITAL ENCOUNTER (OUTPATIENT)
Dept: PHYSICAL THERAPY | Facility: HOSPITAL | Age: 60
Setting detail: THERAPIES SERIES
Discharge: HOME | End: 2020-01-21

## 2020-01-21 DIAGNOSIS — G89.29 CHRONIC PAIN OF RIGHT KNEE: ICD-10-CM

## 2020-01-21 DIAGNOSIS — M25.561 CHRONIC PAIN OF RIGHT KNEE: ICD-10-CM

## 2020-01-21 DIAGNOSIS — M23.91 INTERNAL DERANGEMENT OF RIGHT KNEE: Primary | ICD-10-CM

## 2020-01-21 PROCEDURE — 97110 THERAPEUTIC EXERCISES: CPT

## 2020-01-21 NOTE — THERAPY TREATMENT NOTE
Outpatient Physical Therapy Ortho Treatment Note  [facility]     Patient Name: Jeff Werner  : 1960  MRN: [id number]  Today's Date: 2020      Visit Date: 2020  Subjective Improvement: 20-30%  Visit Number:     Recert Date:   2020  MD Visit:   ?  Total Approved Visits:  4 visits from 1/15/2020 to 2020   ( 12 units )      Visit Dx:    ICD-10-CM ICD-9-CM   1. Internal derangement of right knee M23.91 717.9   2. Chronic pain of right knee M25.561 719.46    G89.29 338.29       Patient Active Problem List   Diagnosis   • Right knee pain   • Degenerative disc disease, lumbar   • Internal derangement of right knee   • Chronic midline low back pain without sciatica   • Mechanical pain of right knee   • Numbness and tingling of left leg        Past Medical History:   Diagnosis Date   • Arthritis of back    • Asthma    • COPD (chronic obstructive pulmonary disease) (CMS/HCC)    • Degenerative disc disease, lumbar    • Elevated cholesterol    • Hypertension    • Lumbosacral disc disease     history of disc hernations in the lumbar spine        Past Surgical History:   Procedure Laterality Date   • COLONOSCOPY N/A 2018    Procedure: COLONOSCOPY;  Surgeon: Jovanny Landis DO;  Location: [facility] ENDOSCOPY;  Service: Gastroenterology       PT Ortho     Row Name 20 1100       Precautions and Contraindications    Precautions/Limitations  no known precautions/limitations  -BB       Posture/Observations    Posture/Observations Comments  No gaurding or LOB. Pt presents without antalgia this date.   -BB      User Key  (r) = Recorded By, (t) = Taken By, (c) = Cosigned By    Initials Name Provider Type    Licha Harrell PTA Physical Therapy Assistant                      PT Assessment/Plan     Row Name 20 1100          PT Assessment    Assessment Comments  Continued to encourage HEP.  Pt feels it may be some better but continues to pop at times.   -BB        PT Plan     PT Frequency  2x/week  -BB     Predicted Duration of Therapy Intervention (Therapy Eval)  x 3 weeks  -BB     PT Plan Comments  Continue with remaining 3 visits to focus on strength and balance unless otherwise advised.   -BB       User Key  (r) = Recorded By, (t) = Taken By, (c) = Cosigned By    Initials Name Provider Type    Licha Harrell PTA Physical Therapy Assistant            OP Exercises     Row Name 01/21/20 1100             Subjective Comments    Subjective Comments  Reports it is not hurting right now .   -BB         Subjective Pain    Able to rate subjective pain?  yes  -BB      Pre-Treatment Pain Level  0  -BB      Post-Treatment Pain Level  0  -BB         Exercise 1    Exercise Name 1  Pro II level 2 seat 10 strength and ROM  -BB      Time 1  10  -BB         Exercise 2    Exercise Name 2  Incline stretch  -BB      Reps 2  3  -BB      Time 2  30  -BB         Exercise 3    Exercise Name 3  HS stretch R   -BB      Reps 3  3  -BB      Time 3  30  -BB         Exercise 4    Exercise Name 4  ecc step downs R on R in  -BB      Cueing 4  Verbal;Demo  -BB      Sets 4  2  -BB      Reps 4  10  -BB         Exercise 5    Exercise Name 5  Step down lat with focus on ecc control  -BB      Cueing 5  Verbal;Demo  -BB      Sets 5  2  -BB      Reps 5  10  -BB         Exercise 6    Exercise Name 6  LP 2 sled 3  -BB      Sets 6  2  -BB      Reps 6  10  -BB      Additional Comments  75 lb  -BB         Exercise 7    Exercise Name 7  Cybex hip abd  -BB      Sets 7  2  -BB      Reps 7  10  -BB      Additional Comments  35 lb  -BB         Exercise 8    Exercise Name 8  Ramp fwd  -BB      Reps 8  5 x   -BB         Exercise 9    Exercise Name 9  Ship shaper lat with UE support for balance  -BB      Reps 9  20  -BB        User Key  (r) = Recorded By, (t) = Taken By, (c) = Cosigned By    Initials Name Provider Type    Licha Harrell PTA Physical Therapy Assistant                       PT OP Goals     Row Name 01/21/20  1100          PT Short Term Goals    STG Date to Achieve  12/31/19  -BB     STG 1  Able to perform 10 right SLR's with good eccentric control of R quads  -BB     STG 1 Progress  Met  -BB     STG 2  Improve R hip extension MMT to 5/5  -BB     STG 2 Progress  Met  -BB     STG 3  Able to stand for prolonged periods at work and descend stairs with minimal to no R knee pain  -BB     STG 3 Progress  Met  -BB     STG 4  Improve right hamstrings flexibility to >/=70 deg with 90/90 SLR test  -BB     STG 4 Progress  Ongoing;Progressing  -BB     STG 5  Able to kneel at work for extended periods with minimal to no R knee pain  -BB     STG 5 Progress  Not Met  -BB        Time Calculation    PT Goal Re-Cert Due Date  01/28/20  -BB       User Key  (r) = Recorded By, (t) = Taken By, (c) = Cosigned By    Initials Name Provider Type    Licha Harrell PTA Physical Therapy Assistant          Therapy Education  Education Details: Continued to encourage HEP.   Given: HEP  Program: Reinforced              Time Calculation:   Start Time: 1105  Stop Time: 1145  Time Calculation (min): 40 min  Total Timed Code Minutes- PT: 40 minute(s)  Therapy Charges for Today     Code Description Service Date Service Provider Modifiers Qty    94134638712 HC PT THER PROC EA 15 MIN 1/21/2020 Licha Ponce PTA GP 3                    Licha Ponce PTA  1/21/2020

## 2020-01-28 ENCOUNTER — APPOINTMENT (OUTPATIENT)
Dept: PHYSICAL THERAPY | Facility: HOSPITAL | Age: 60
End: 2020-01-28

## 2020-01-29 ENCOUNTER — APPOINTMENT (OUTPATIENT)
Dept: PHYSICAL THERAPY | Facility: HOSPITAL | Age: 60
End: 2020-01-29

## 2020-01-29 ENCOUNTER — APPOINTMENT (OUTPATIENT)
Dept: CT IMAGING | Facility: HOSPITAL | Age: 60
End: 2020-01-29

## 2020-01-29 ENCOUNTER — APPOINTMENT (OUTPATIENT)
Dept: GENERAL RADIOLOGY | Facility: HOSPITAL | Age: 60
End: 2020-01-29

## 2020-01-29 ENCOUNTER — HOSPITAL ENCOUNTER (INPATIENT)
Facility: HOSPITAL | Age: 60
LOS: 5 days | Discharge: HOME HEALTH | End: 2020-02-03
Attending: EMERGENCY MEDICINE | Admitting: INTERNAL MEDICINE

## 2020-01-29 DIAGNOSIS — J44.1 COPD EXACERBATION (HCC): Primary | ICD-10-CM

## 2020-01-29 DIAGNOSIS — Z78.9 IMPAIRED MOBILITY AND ADLS: ICD-10-CM

## 2020-01-29 DIAGNOSIS — J96.01 ACUTE RESPIRATORY FAILURE WITH HYPOXIA (HCC): ICD-10-CM

## 2020-01-29 DIAGNOSIS — Z74.09 IMPAIRED MOBILITY AND ADLS: ICD-10-CM

## 2020-01-29 DIAGNOSIS — J10.1 TYPE A INFLUENZA: ICD-10-CM

## 2020-01-29 DIAGNOSIS — Z74.09 IMPAIRED PHYSICAL MOBILITY: ICD-10-CM

## 2020-01-29 DIAGNOSIS — R91.1 PULMONARY NODULE: ICD-10-CM

## 2020-01-29 LAB
ALBUMIN SERPL-MCNC: 4.5 G/DL (ref 3.5–5.2)
ALBUMIN/GLOB SERPL: 1.5 G/DL
ALP SERPL-CCNC: 68 U/L (ref 39–117)
ALT SERPL W P-5'-P-CCNC: 23 U/L (ref 1–41)
ANION GAP SERPL CALCULATED.3IONS-SCNC: 16 MMOL/L (ref 5–15)
APTT PPP: 34.3 SECONDS (ref 20–40.3)
ARTERIAL PATENCY WRIST A: POSITIVE
AST SERPL-CCNC: 14 U/L (ref 1–40)
ATMOSPHERIC PRESS: 751 MMHG
BASE EXCESS BLDA CALC-SCNC: 4.7 MMOL/L (ref 0–2)
BASOPHILS # BLD AUTO: 0.01 10*3/MM3 (ref 0–0.2)
BASOPHILS NFR BLD AUTO: 0.1 % (ref 0–1.5)
BDY SITE: ABNORMAL
BILIRUB SERPL-MCNC: 0.4 MG/DL (ref 0.2–1.2)
BUN BLD-MCNC: 19 MG/DL (ref 6–20)
BUN/CREAT SERPL: 13.1 (ref 7–25)
CALCIUM SPEC-SCNC: 9.2 MG/DL (ref 8.6–10.5)
CHLORIDE SERPL-SCNC: 92 MMOL/L (ref 98–107)
CO2 SERPL-SCNC: 29 MMOL/L (ref 22–29)
CREAT BLD-MCNC: 1.45 MG/DL (ref 0.76–1.27)
D-DIMER, QUANTITATIVE (MAD,POW, STR): 839 NG/ML (FEU) (ref 0–470)
DEPRECATED RDW RBC AUTO: 45.8 FL (ref 37–54)
EOSINOPHIL # BLD AUTO: 0 10*3/MM3 (ref 0–0.4)
EOSINOPHIL NFR BLD AUTO: 0 % (ref 0.3–6.2)
ERYTHROCYTE [DISTWIDTH] IN BLOOD BY AUTOMATED COUNT: 14.1 % (ref 12.3–15.4)
GAS FLOW AIRWAY: 3 LPM
GFR SERPL CREATININE-BSD FRML MDRD: 50 ML/MIN/1.73
GLOBULIN UR ELPH-MCNC: 3 GM/DL
GLUCOSE BLD-MCNC: 134 MG/DL (ref 65–99)
HCO3 BLDA-SCNC: 30 MMOL/L (ref 20–26)
HCT VFR BLD AUTO: 46.4 % (ref 37.5–51)
HGB BLD-MCNC: 15.1 G/DL (ref 13–17.7)
HOLD SPECIMEN: NORMAL
HOLD SPECIMEN: NORMAL
IMM GRANULOCYTES # BLD AUTO: 0.01 10*3/MM3 (ref 0–0.05)
IMM GRANULOCYTES NFR BLD AUTO: 0.1 % (ref 0–0.5)
INR PPP: 0.92 (ref 0.8–1.2)
LYMPHOCYTES # BLD AUTO: 1.04 10*3/MM3 (ref 0.7–3.1)
LYMPHOCYTES NFR BLD AUTO: 15.1 % (ref 19.6–45.3)
Lab: ABNORMAL
MCH RBC QN AUTO: 28.8 PG (ref 26.6–33)
MCHC RBC AUTO-ENTMCNC: 32.5 G/DL (ref 31.5–35.7)
MCV RBC AUTO: 88.5 FL (ref 79–97)
MODALITY: ABNORMAL
MONOCYTES # BLD AUTO: 0.86 10*3/MM3 (ref 0.1–0.9)
MONOCYTES NFR BLD AUTO: 12.5 % (ref 5–12)
NEUTROPHILS # BLD AUTO: 4.96 10*3/MM3 (ref 1.7–7)
NEUTROPHILS NFR BLD AUTO: 72.2 % (ref 42.7–76)
NRBC BLD AUTO-RTO: 0 /100 WBC (ref 0–0.2)
NT-PROBNP SERPL-MCNC: 94.9 PG/ML (ref 5–900)
PCO2 BLDA: 46.2 MM HG (ref 35–45)
PH BLDA: 7.42 PH UNITS (ref 7.35–7.45)
PLATELET # BLD AUTO: 158 10*3/MM3 (ref 140–450)
PMV BLD AUTO: 9.8 FL (ref 6–12)
PO2 BLDA: 55.9 MM HG (ref 83–108)
POTASSIUM BLD-SCNC: 3.4 MMOL/L (ref 3.5–5.2)
PROCALCITONIN SERPL-MCNC: 0.19 NG/ML (ref 0.1–0.25)
PROT SERPL-MCNC: 7.5 G/DL (ref 6–8.5)
PROTHROMBIN TIME: 12.2 SECONDS (ref 11.1–15.3)
RBC # BLD AUTO: 5.24 10*6/MM3 (ref 4.14–5.8)
SAO2 % BLDCOA: 89.5 % (ref 94–99)
SODIUM BLD-SCNC: 137 MMOL/L (ref 136–145)
TROPONIN T SERPL-MCNC: <0.01 NG/ML (ref 0–0.03)
TROPONIN T SERPL-MCNC: <0.01 NG/ML (ref 0–0.03)
VENTILATOR MODE: ABNORMAL
WBC NRBC COR # BLD: 6.88 10*3/MM3 (ref 3.4–10.8)
WHOLE BLOOD HOLD SPECIMEN: NORMAL
WHOLE BLOOD HOLD SPECIMEN: NORMAL

## 2020-01-29 PROCEDURE — 0 IOPAMIDOL PER 1 ML: Performed by: EMERGENCY MEDICINE

## 2020-01-29 PROCEDURE — 94640 AIRWAY INHALATION TREATMENT: CPT

## 2020-01-29 PROCEDURE — 85379 FIBRIN DEGRADATION QUANT: CPT | Performed by: EMERGENCY MEDICINE

## 2020-01-29 PROCEDURE — G0378 HOSPITAL OBSERVATION PER HR: HCPCS

## 2020-01-29 PROCEDURE — 71046 X-RAY EXAM CHEST 2 VIEWS: CPT

## 2020-01-29 PROCEDURE — 85025 COMPLETE CBC W/AUTO DIFF WBC: CPT | Performed by: EMERGENCY MEDICINE

## 2020-01-29 PROCEDURE — 84145 PROCALCITONIN (PCT): CPT | Performed by: INTERNAL MEDICINE

## 2020-01-29 PROCEDURE — 85730 THROMBOPLASTIN TIME PARTIAL: CPT | Performed by: EMERGENCY MEDICINE

## 2020-01-29 PROCEDURE — 25010000002 LEVOFLOXACIN PER 250 MG: Performed by: EMERGENCY MEDICINE

## 2020-01-29 PROCEDURE — 36600 WITHDRAWAL OF ARTERIAL BLOOD: CPT

## 2020-01-29 PROCEDURE — 82803 BLOOD GASES ANY COMBINATION: CPT

## 2020-01-29 PROCEDURE — 71275 CT ANGIOGRAPHY CHEST: CPT

## 2020-01-29 PROCEDURE — 93005 ELECTROCARDIOGRAM TRACING: CPT

## 2020-01-29 PROCEDURE — 85610 PROTHROMBIN TIME: CPT | Performed by: EMERGENCY MEDICINE

## 2020-01-29 PROCEDURE — 84484 ASSAY OF TROPONIN QUANT: CPT | Performed by: EMERGENCY MEDICINE

## 2020-01-29 PROCEDURE — 93005 ELECTROCARDIOGRAM TRACING: CPT | Performed by: EMERGENCY MEDICINE

## 2020-01-29 PROCEDURE — 93010 ELECTROCARDIOGRAM REPORT: CPT | Performed by: INTERNAL MEDICINE

## 2020-01-29 PROCEDURE — 99285 EMERGENCY DEPT VISIT HI MDM: CPT

## 2020-01-29 PROCEDURE — 83880 ASSAY OF NATRIURETIC PEPTIDE: CPT | Performed by: EMERGENCY MEDICINE

## 2020-01-29 PROCEDURE — 80053 COMPREHEN METABOLIC PANEL: CPT | Performed by: EMERGENCY MEDICINE

## 2020-01-29 PROCEDURE — 25010000002 METHYLPREDNISOLONE PER 125 MG: Performed by: EMERGENCY MEDICINE

## 2020-01-29 RX ORDER — SODIUM CHLORIDE 0.9 % (FLUSH) 0.9 %
10 SYRINGE (ML) INJECTION EVERY 12 HOURS SCHEDULED
Status: DISCONTINUED | OUTPATIENT
Start: 2020-01-30 | End: 2020-02-03 | Stop reason: HOSPADM

## 2020-01-29 RX ORDER — SODIUM CHLORIDE 9 MG/ML
125 INJECTION, SOLUTION INTRAVENOUS CONTINUOUS
Status: DISCONTINUED | OUTPATIENT
Start: 2020-01-29 | End: 2020-01-30

## 2020-01-29 RX ORDER — SODIUM CHLORIDE 0.9 % (FLUSH) 0.9 %
10 SYRINGE (ML) INJECTION AS NEEDED
Status: DISCONTINUED | OUTPATIENT
Start: 2020-01-29 | End: 2020-02-03 | Stop reason: HOSPADM

## 2020-01-29 RX ORDER — IPRATROPIUM BROMIDE AND ALBUTEROL SULFATE 2.5; .5 MG/3ML; MG/3ML
3 SOLUTION RESPIRATORY (INHALATION)
Status: DISCONTINUED | OUTPATIENT
Start: 2020-01-30 | End: 2020-01-29

## 2020-01-29 RX ORDER — LEVOFLOXACIN 5 MG/ML
750 INJECTION, SOLUTION INTRAVENOUS ONCE
Status: COMPLETED | OUTPATIENT
Start: 2020-01-29 | End: 2020-01-29

## 2020-01-29 RX ORDER — GUAIFENESIN 600 MG/1
600 TABLET, EXTENDED RELEASE ORAL EVERY 12 HOURS SCHEDULED
Status: DISCONTINUED | OUTPATIENT
Start: 2020-01-30 | End: 2020-02-03 | Stop reason: HOSPADM

## 2020-01-29 RX ORDER — HYDROCODONE BITARTRATE AND ACETAMINOPHEN 5; 325 MG/1; MG/1
1 TABLET ORAL EVERY 4 HOURS PRN
Status: DISCONTINUED | OUTPATIENT
Start: 2020-01-29 | End: 2020-02-03 | Stop reason: HOSPADM

## 2020-01-29 RX ORDER — ACETAMINOPHEN 325 MG/1
650 TABLET ORAL ONCE
Status: COMPLETED | OUTPATIENT
Start: 2020-01-29 | End: 2020-01-29

## 2020-01-29 RX ORDER — METHYLPREDNISOLONE SODIUM SUCCINATE 125 MG/2ML
125 INJECTION, POWDER, LYOPHILIZED, FOR SOLUTION INTRAMUSCULAR; INTRAVENOUS ONCE
Status: COMPLETED | OUTPATIENT
Start: 2020-01-29 | End: 2020-01-29

## 2020-01-29 RX ORDER — METHYLPREDNISOLONE SODIUM SUCCINATE 40 MG/ML
40 INJECTION, POWDER, LYOPHILIZED, FOR SOLUTION INTRAMUSCULAR; INTRAVENOUS EVERY 8 HOURS
Status: DISCONTINUED | OUTPATIENT
Start: 2020-01-30 | End: 2020-02-03 | Stop reason: HOSPADM

## 2020-01-29 RX ORDER — IPRATROPIUM BROMIDE AND ALBUTEROL SULFATE 2.5; .5 MG/3ML; MG/3ML
3 SOLUTION RESPIRATORY (INHALATION)
Status: DISCONTINUED | OUTPATIENT
Start: 2020-01-29 | End: 2020-02-03 | Stop reason: HOSPADM

## 2020-01-29 RX ORDER — POTASSIUM CHLORIDE 1.5 G/1.77G
40 POWDER, FOR SOLUTION ORAL ONCE
Status: COMPLETED | OUTPATIENT
Start: 2020-01-30 | End: 2020-01-30

## 2020-01-29 RX ADMIN — SODIUM CHLORIDE 125 ML/HR: 900 INJECTION, SOLUTION INTRAVENOUS at 18:49

## 2020-01-29 RX ADMIN — METHYLPREDNISOLONE SODIUM SUCCINATE 125 MG: 125 INJECTION, POWDER, FOR SOLUTION INTRAMUSCULAR; INTRAVENOUS at 18:48

## 2020-01-29 RX ADMIN — LEVOFLOXACIN 750 MG: 5 INJECTION, SOLUTION INTRAVENOUS at 20:27

## 2020-01-29 RX ADMIN — IPRATROPIUM BROMIDE AND ALBUTEROL SULFATE 3 ML: 2.5; .5 SOLUTION RESPIRATORY (INHALATION) at 19:24

## 2020-01-29 RX ADMIN — IOPAMIDOL 58 ML: 755 INJECTION, SOLUTION INTRAVENOUS at 20:50

## 2020-01-29 RX ADMIN — ACETAMINOPHEN 650 MG: 325 TABLET, FILM COATED ORAL at 18:48

## 2020-01-30 PROBLEM — E87.6 HYPOKALEMIA: Status: ACTIVE | Noted: 2020-01-30

## 2020-01-30 PROBLEM — J96.01 ACUTE RESPIRATORY FAILURE WITH HYPOXIA (HCC): Status: ACTIVE | Noted: 2020-01-30

## 2020-01-30 PROBLEM — E78.00 ELEVATED CHOLESTEROL: Status: ACTIVE | Noted: 2020-01-30

## 2020-01-30 PROBLEM — I10 HYPERTENSION: Status: ACTIVE | Noted: 2020-01-30

## 2020-01-30 PROBLEM — Z72.0 TOBACCO ABUSE: Status: ACTIVE | Noted: 2020-01-30

## 2020-01-30 LAB
ANION GAP SERPL CALCULATED.3IONS-SCNC: 12 MMOL/L (ref 5–15)
B PERT DNA SPEC QL NAA+PROBE: NOT DETECTED
BASOPHILS # BLD AUTO: 0 10*3/MM3 (ref 0–0.2)
BASOPHILS NFR BLD AUTO: 0 % (ref 0–1.5)
BUN BLD-MCNC: 18 MG/DL (ref 6–20)
BUN/CREAT SERPL: 14.9 (ref 7–25)
C PNEUM DNA NPH QL NAA+NON-PROBE: NOT DETECTED
CALCIUM SPEC-SCNC: 8.5 MG/DL (ref 8.6–10.5)
CHLORIDE SERPL-SCNC: 99 MMOL/L (ref 98–107)
CO2 SERPL-SCNC: 26 MMOL/L (ref 22–29)
CREAT BLD-MCNC: 1.21 MG/DL (ref 0.76–1.27)
DEPRECATED RDW RBC AUTO: 45.6 FL (ref 37–54)
EOSINOPHIL # BLD AUTO: 0 10*3/MM3 (ref 0–0.4)
EOSINOPHIL NFR BLD AUTO: 0 % (ref 0.3–6.2)
ERYTHROCYTE [DISTWIDTH] IN BLOOD BY AUTOMATED COUNT: 14.1 % (ref 12.3–15.4)
FLUAV H1 2009 PAND RNA NPH QL NAA+PROBE: DETECTED
FLUAV H1 HA GENE NPH QL NAA+PROBE: NOT DETECTED
FLUAV H3 RNA NPH QL NAA+PROBE: NOT DETECTED
FLUAV SUBTYP SPEC NAA+PROBE: NOT DETECTED
FLUBV RNA ISLT QL NAA+PROBE: NOT DETECTED
GFR SERPL CREATININE-BSD FRML MDRD: 62 ML/MIN/1.73
GLUCOSE BLD-MCNC: 273 MG/DL (ref 65–99)
HADV DNA SPEC NAA+PROBE: NOT DETECTED
HCOV 229E RNA SPEC QL NAA+PROBE: NOT DETECTED
HCOV HKU1 RNA SPEC QL NAA+PROBE: NOT DETECTED
HCOV NL63 RNA SPEC QL NAA+PROBE: NOT DETECTED
HCOV OC43 RNA SPEC QL NAA+PROBE: NOT DETECTED
HCT VFR BLD AUTO: 40.5 % (ref 37.5–51)
HGB BLD-MCNC: 13.3 G/DL (ref 13–17.7)
HMPV RNA NPH QL NAA+NON-PROBE: NOT DETECTED
HPIV1 RNA SPEC QL NAA+PROBE: NOT DETECTED
HPIV2 RNA SPEC QL NAA+PROBE: NOT DETECTED
HPIV3 RNA NPH QL NAA+PROBE: NOT DETECTED
HPIV4 P GENE NPH QL NAA+PROBE: NOT DETECTED
IMM GRANULOCYTES # BLD AUTO: 0.01 10*3/MM3 (ref 0–0.05)
IMM GRANULOCYTES NFR BLD AUTO: 0.2 % (ref 0–0.5)
LYMPHOCYTES # BLD AUTO: 0.52 10*3/MM3 (ref 0.7–3.1)
LYMPHOCYTES NFR BLD AUTO: 12.5 % (ref 19.6–45.3)
M PNEUMO IGG SER IA-ACNC: NOT DETECTED
MCH RBC QN AUTO: 29.2 PG (ref 26.6–33)
MCHC RBC AUTO-ENTMCNC: 32.8 G/DL (ref 31.5–35.7)
MCV RBC AUTO: 88.8 FL (ref 79–97)
MONOCYTES # BLD AUTO: 0.08 10*3/MM3 (ref 0.1–0.9)
MONOCYTES NFR BLD AUTO: 1.9 % (ref 5–12)
NEUTROPHILS # BLD AUTO: 3.55 10*3/MM3 (ref 1.7–7)
NEUTROPHILS NFR BLD AUTO: 85.4 % (ref 42.7–76)
NRBC BLD AUTO-RTO: 0 /100 WBC (ref 0–0.2)
PLATELET # BLD AUTO: 134 10*3/MM3 (ref 140–450)
PMV BLD AUTO: 10.1 FL (ref 6–12)
POTASSIUM BLD-SCNC: 4.4 MMOL/L (ref 3.5–5.2)
RBC # BLD AUTO: 4.56 10*6/MM3 (ref 4.14–5.8)
RHINOVIRUS RNA SPEC NAA+PROBE: NOT DETECTED
RSV RNA NPH QL NAA+NON-PROBE: NOT DETECTED
SODIUM BLD-SCNC: 137 MMOL/L (ref 136–145)
TROPONIN T SERPL-MCNC: <0.01 NG/ML (ref 0–0.03)
WBC NRBC COR # BLD: 4.16 10*3/MM3 (ref 3.4–10.8)

## 2020-01-30 PROCEDURE — 25010000002 ONDANSETRON PER 1 MG: Performed by: INTERNAL MEDICINE

## 2020-01-30 PROCEDURE — 85025 COMPLETE CBC W/AUTO DIFF WBC: CPT | Performed by: INTERNAL MEDICINE

## 2020-01-30 PROCEDURE — 87205 SMEAR GRAM STAIN: CPT | Performed by: INTERNAL MEDICINE

## 2020-01-30 PROCEDURE — 94799 UNLISTED PULMONARY SVC/PX: CPT

## 2020-01-30 PROCEDURE — 94760 N-INVAS EAR/PLS OXIMETRY 1: CPT

## 2020-01-30 PROCEDURE — 87070 CULTURE OTHR SPECIMN AEROBIC: CPT | Performed by: INTERNAL MEDICINE

## 2020-01-30 PROCEDURE — 80048 BASIC METABOLIC PNL TOTAL CA: CPT | Performed by: INTERNAL MEDICINE

## 2020-01-30 PROCEDURE — 25010000002 METHYLPREDNISOLONE PER 40 MG: Performed by: INTERNAL MEDICINE

## 2020-01-30 PROCEDURE — 25010000002 LEVOFLOXACIN PER 250 MG: Performed by: INTERNAL MEDICINE

## 2020-01-30 PROCEDURE — G0378 HOSPITAL OBSERVATION PER HR: HCPCS

## 2020-01-30 PROCEDURE — 84484 ASSAY OF TROPONIN QUANT: CPT | Performed by: INTERNAL MEDICINE

## 2020-01-30 PROCEDURE — 0099U HC BIOFIRE FILMARRAY RESP PANEL 1: CPT | Performed by: NURSE PRACTITIONER

## 2020-01-30 RX ORDER — CHLORDIAZEPOXIDE HYDROCHLORIDE 25 MG/1
25 CAPSULE, GELATIN COATED ORAL EVERY 6 HOURS SCHEDULED
Status: DISCONTINUED | OUTPATIENT
Start: 2020-01-30 | End: 2020-02-03 | Stop reason: HOSPADM

## 2020-01-30 RX ORDER — OSELTAMIVIR PHOSPHATE 75 MG/1
75 CAPSULE ORAL EVERY 12 HOURS SCHEDULED
Status: DISCONTINUED | OUTPATIENT
Start: 2020-01-30 | End: 2020-02-03 | Stop reason: HOSPADM

## 2020-01-30 RX ORDER — LEVOFLOXACIN 5 MG/ML
750 INJECTION, SOLUTION INTRAVENOUS EVERY 24 HOURS
Status: DISCONTINUED | OUTPATIENT
Start: 2020-01-30 | End: 2020-01-31

## 2020-01-30 RX ORDER — NICOTINE 21 MG/24HR
1 PATCH, TRANSDERMAL 24 HOURS TRANSDERMAL
Status: DISCONTINUED | OUTPATIENT
Start: 2020-01-30 | End: 2020-02-03 | Stop reason: HOSPADM

## 2020-01-30 RX ORDER — ONDANSETRON 2 MG/ML
4 INJECTION INTRAMUSCULAR; INTRAVENOUS EVERY 6 HOURS PRN
Status: DISCONTINUED | OUTPATIENT
Start: 2020-01-30 | End: 2020-02-03 | Stop reason: HOSPADM

## 2020-01-30 RX ADMIN — METHYLPREDNISOLONE SODIUM SUCCINATE 40 MG: 40 INJECTION, POWDER, FOR SOLUTION INTRAMUSCULAR; INTRAVENOUS at 15:43

## 2020-01-30 RX ADMIN — SODIUM CHLORIDE, PRESERVATIVE FREE 10 ML: 5 INJECTION INTRAVENOUS at 20:32

## 2020-01-30 RX ADMIN — LEVOFLOXACIN 750 MG: 5 INJECTION, SOLUTION INTRAVENOUS at 20:33

## 2020-01-30 RX ADMIN — HYDROCODONE BITARTRATE AND ACETAMINOPHEN 1 TABLET: 5; 325 TABLET ORAL at 11:16

## 2020-01-30 RX ADMIN — GUAIFENESIN 600 MG: 600 TABLET, EXTENDED RELEASE ORAL at 00:21

## 2020-01-30 RX ADMIN — NICOTINE 1 PATCH: 14 PATCH, EXTENDED RELEASE TRANSDERMAL at 15:43

## 2020-01-30 RX ADMIN — CHLORDIAZEPOXIDE HYDROCHLORIDE 25 MG: 25 CAPSULE ORAL at 00:36

## 2020-01-30 RX ADMIN — HYDROCODONE BITARTRATE AND ACETAMINOPHEN 1 TABLET: 5; 325 TABLET ORAL at 22:40

## 2020-01-30 RX ADMIN — GUAIFENESIN 600 MG: 600 TABLET, EXTENDED RELEASE ORAL at 11:14

## 2020-01-30 RX ADMIN — SODIUM CHLORIDE, PRESERVATIVE FREE 10 ML: 5 INJECTION INTRAVENOUS at 00:21

## 2020-01-30 RX ADMIN — METHYLPREDNISOLONE SODIUM SUCCINATE 40 MG: 40 INJECTION, POWDER, FOR SOLUTION INTRAMUSCULAR; INTRAVENOUS at 08:34

## 2020-01-30 RX ADMIN — POTASSIUM CHLORIDE 40 MEQ: 1.5 POWDER, FOR SOLUTION ORAL at 00:21

## 2020-01-30 RX ADMIN — ONDANSETRON 4 MG: 2 INJECTION INTRAMUSCULAR; INTRAVENOUS at 20:48

## 2020-01-30 RX ADMIN — SODIUM CHLORIDE 125 ML/HR: 900 INJECTION, SOLUTION INTRAVENOUS at 00:31

## 2020-01-30 RX ADMIN — OSELTAMIVIR PHOSPHATE 75 MG: 75 CAPSULE ORAL at 20:31

## 2020-01-30 RX ADMIN — CHLORDIAZEPOXIDE HYDROCHLORIDE 25 MG: 25 CAPSULE ORAL at 06:07

## 2020-01-30 RX ADMIN — METHYLPREDNISOLONE SODIUM SUCCINATE 40 MG: 40 INJECTION, POWDER, FOR SOLUTION INTRAMUSCULAR; INTRAVENOUS at 00:21

## 2020-01-30 RX ADMIN — CHLORDIAZEPOXIDE HYDROCHLORIDE 25 MG: 25 CAPSULE ORAL at 11:14

## 2020-01-30 RX ADMIN — SODIUM CHLORIDE 125 ML/HR: 900 INJECTION, SOLUTION INTRAVENOUS at 08:35

## 2020-01-30 RX ADMIN — IPRATROPIUM BROMIDE AND ALBUTEROL SULFATE 3 ML: 2.5; .5 SOLUTION RESPIRATORY (INHALATION) at 19:44

## 2020-01-30 RX ADMIN — IPRATROPIUM BROMIDE AND ALBUTEROL SULFATE 3 ML: 2.5; .5 SOLUTION RESPIRATORY (INHALATION) at 16:02

## 2020-01-31 PROBLEM — J10.1 TYPE A INFLUENZA: Status: ACTIVE | Noted: 2020-01-31

## 2020-01-31 LAB
ANION GAP SERPL CALCULATED.3IONS-SCNC: 14 MMOL/L (ref 5–15)
BUN BLD-MCNC: 21 MG/DL (ref 6–20)
BUN/CREAT SERPL: 23.3 (ref 7–25)
CALCIUM SPEC-SCNC: 8.5 MG/DL (ref 8.6–10.5)
CHLORIDE SERPL-SCNC: 104 MMOL/L (ref 98–107)
CO2 SERPL-SCNC: 25 MMOL/L (ref 22–29)
CREAT BLD-MCNC: 0.9 MG/DL (ref 0.76–1.27)
DEPRECATED RDW RBC AUTO: 45.9 FL (ref 37–54)
ERYTHROCYTE [DISTWIDTH] IN BLOOD BY AUTOMATED COUNT: 14.2 % (ref 12.3–15.4)
GFR SERPL CREATININE-BSD FRML MDRD: 87 ML/MIN/1.73
GLUCOSE BLD-MCNC: 168 MG/DL (ref 65–99)
HCT VFR BLD AUTO: 39.8 % (ref 37.5–51)
HGB BLD-MCNC: 12.9 G/DL (ref 13–17.7)
MCH RBC QN AUTO: 28.7 PG (ref 26.6–33)
MCHC RBC AUTO-ENTMCNC: 32.4 G/DL (ref 31.5–35.7)
MCV RBC AUTO: 88.6 FL (ref 79–97)
PLATELET # BLD AUTO: 161 10*3/MM3 (ref 140–450)
PMV BLD AUTO: 10.9 FL (ref 6–12)
POTASSIUM BLD-SCNC: 4.3 MMOL/L (ref 3.5–5.2)
PROCALCITONIN SERPL-MCNC: 0.1 NG/ML (ref 0.1–0.25)
RBC # BLD AUTO: 4.49 10*6/MM3 (ref 4.14–5.8)
SODIUM BLD-SCNC: 143 MMOL/L (ref 136–145)
WBC NRBC COR # BLD: 8.45 10*3/MM3 (ref 3.4–10.8)
WHOLE BLOOD HOLD SPECIMEN: NORMAL

## 2020-01-31 PROCEDURE — 94799 UNLISTED PULMONARY SVC/PX: CPT

## 2020-01-31 PROCEDURE — 94760 N-INVAS EAR/PLS OXIMETRY 1: CPT

## 2020-01-31 PROCEDURE — 84145 PROCALCITONIN (PCT): CPT | Performed by: HOSPITALIST

## 2020-01-31 PROCEDURE — 80048 BASIC METABOLIC PNL TOTAL CA: CPT | Performed by: NURSE PRACTITIONER

## 2020-01-31 PROCEDURE — 25010000002 METHYLPREDNISOLONE PER 40 MG: Performed by: INTERNAL MEDICINE

## 2020-01-31 PROCEDURE — 85027 COMPLETE CBC AUTOMATED: CPT | Performed by: NURSE PRACTITIONER

## 2020-01-31 RX ADMIN — NICOTINE 1 PATCH: 14 PATCH, EXTENDED RELEASE TRANSDERMAL at 17:29

## 2020-01-31 RX ADMIN — IPRATROPIUM BROMIDE AND ALBUTEROL SULFATE 3 ML: 2.5; .5 SOLUTION RESPIRATORY (INHALATION) at 10:51

## 2020-01-31 RX ADMIN — SODIUM CHLORIDE, PRESERVATIVE FREE 10 ML: 5 INJECTION INTRAVENOUS at 10:13

## 2020-01-31 RX ADMIN — CHLORDIAZEPOXIDE HYDROCHLORIDE 25 MG: 25 CAPSULE ORAL at 12:34

## 2020-01-31 RX ADMIN — METHYLPREDNISOLONE SODIUM SUCCINATE 40 MG: 40 INJECTION, POWDER, FOR SOLUTION INTRAMUSCULAR; INTRAVENOUS at 10:12

## 2020-01-31 RX ADMIN — METHYLPREDNISOLONE SODIUM SUCCINATE 40 MG: 40 INJECTION, POWDER, FOR SOLUTION INTRAMUSCULAR; INTRAVENOUS at 00:12

## 2020-01-31 RX ADMIN — OSELTAMIVIR PHOSPHATE 75 MG: 75 CAPSULE ORAL at 10:12

## 2020-01-31 RX ADMIN — IPRATROPIUM BROMIDE AND ALBUTEROL SULFATE 3 ML: 2.5; .5 SOLUTION RESPIRATORY (INHALATION) at 07:46

## 2020-01-31 RX ADMIN — OSELTAMIVIR PHOSPHATE 75 MG: 75 CAPSULE ORAL at 20:51

## 2020-01-31 RX ADMIN — GUAIFENESIN 600 MG: 600 TABLET, EXTENDED RELEASE ORAL at 20:51

## 2020-01-31 RX ADMIN — METHYLPREDNISOLONE SODIUM SUCCINATE 40 MG: 40 INJECTION, POWDER, FOR SOLUTION INTRAMUSCULAR; INTRAVENOUS at 17:30

## 2020-01-31 RX ADMIN — HYDROCODONE BITARTRATE AND ACETAMINOPHEN 1 TABLET: 5; 325 TABLET ORAL at 20:51

## 2020-01-31 RX ADMIN — SODIUM CHLORIDE, PRESERVATIVE FREE 10 ML: 5 INJECTION INTRAVENOUS at 20:52

## 2020-01-31 RX ADMIN — GUAIFENESIN 600 MG: 600 TABLET, EXTENDED RELEASE ORAL at 10:12

## 2020-01-31 RX ADMIN — IPRATROPIUM BROMIDE AND ALBUTEROL SULFATE 3 ML: 2.5; .5 SOLUTION RESPIRATORY (INHALATION) at 20:12

## 2020-01-31 RX ADMIN — CHLORDIAZEPOXIDE HYDROCHLORIDE 25 MG: 25 CAPSULE ORAL at 17:30

## 2020-02-01 LAB
ANION GAP SERPL CALCULATED.3IONS-SCNC: 11 MMOL/L (ref 5–15)
BACTERIA SPEC RESP CULT: NORMAL
BUN BLD-MCNC: 23 MG/DL (ref 6–20)
BUN/CREAT SERPL: 25.8 (ref 7–25)
CALCIUM SPEC-SCNC: 8.7 MG/DL (ref 8.6–10.5)
CHLORIDE SERPL-SCNC: 102 MMOL/L (ref 98–107)
CO2 SERPL-SCNC: 29 MMOL/L (ref 22–29)
CREAT BLD-MCNC: 0.89 MG/DL (ref 0.76–1.27)
DEPRECATED RDW RBC AUTO: 46.7 FL (ref 37–54)
ERYTHROCYTE [DISTWIDTH] IN BLOOD BY AUTOMATED COUNT: 14.1 % (ref 12.3–15.4)
GFR SERPL CREATININE-BSD FRML MDRD: 88 ML/MIN/1.73
GLUCOSE BLD-MCNC: 170 MG/DL (ref 65–99)
GRAM STN SPEC: NORMAL
HCT VFR BLD AUTO: 40.1 % (ref 37.5–51)
HGB BLD-MCNC: 12.9 G/DL (ref 13–17.7)
MCH RBC QN AUTO: 28.9 PG (ref 26.6–33)
MCHC RBC AUTO-ENTMCNC: 32.2 G/DL (ref 31.5–35.7)
MCV RBC AUTO: 89.9 FL (ref 79–97)
PLATELET # BLD AUTO: 175 10*3/MM3 (ref 140–450)
PMV BLD AUTO: 10.6 FL (ref 6–12)
POTASSIUM BLD-SCNC: 4.3 MMOL/L (ref 3.5–5.2)
RBC # BLD AUTO: 4.46 10*6/MM3 (ref 4.14–5.8)
SODIUM BLD-SCNC: 142 MMOL/L (ref 136–145)
WBC NRBC COR # BLD: 7.37 10*3/MM3 (ref 3.4–10.8)

## 2020-02-01 PROCEDURE — 25010000002 METHYLPREDNISOLONE PER 40 MG: Performed by: INTERNAL MEDICINE

## 2020-02-01 PROCEDURE — 85027 COMPLETE CBC AUTOMATED: CPT | Performed by: NURSE PRACTITIONER

## 2020-02-01 PROCEDURE — 94799 UNLISTED PULMONARY SVC/PX: CPT

## 2020-02-01 PROCEDURE — 80048 BASIC METABOLIC PNL TOTAL CA: CPT | Performed by: NURSE PRACTITIONER

## 2020-02-01 PROCEDURE — 94760 N-INVAS EAR/PLS OXIMETRY 1: CPT

## 2020-02-01 RX ADMIN — METHYLPREDNISOLONE SODIUM SUCCINATE 40 MG: 40 INJECTION, POWDER, FOR SOLUTION INTRAMUSCULAR; INTRAVENOUS at 00:41

## 2020-02-01 RX ADMIN — NICOTINE 1 PATCH: 14 PATCH, EXTENDED RELEASE TRANSDERMAL at 15:49

## 2020-02-01 RX ADMIN — SODIUM CHLORIDE, PRESERVATIVE FREE 10 ML: 5 INJECTION INTRAVENOUS at 19:51

## 2020-02-01 RX ADMIN — METHYLPREDNISOLONE SODIUM SUCCINATE 40 MG: 40 INJECTION, POWDER, FOR SOLUTION INTRAMUSCULAR; INTRAVENOUS at 09:33

## 2020-02-01 RX ADMIN — HYDROCODONE BITARTRATE AND ACETAMINOPHEN 1 TABLET: 5; 325 TABLET ORAL at 11:21

## 2020-02-01 RX ADMIN — OSELTAMIVIR PHOSPHATE 75 MG: 75 CAPSULE ORAL at 09:33

## 2020-02-01 RX ADMIN — SODIUM CHLORIDE, PRESERVATIVE FREE 10 ML: 5 INJECTION INTRAVENOUS at 09:33

## 2020-02-01 RX ADMIN — GUAIFENESIN 600 MG: 600 TABLET, EXTENDED RELEASE ORAL at 09:33

## 2020-02-01 RX ADMIN — CHLORDIAZEPOXIDE HYDROCHLORIDE 25 MG: 25 CAPSULE ORAL at 05:56

## 2020-02-01 RX ADMIN — OSELTAMIVIR PHOSPHATE 75 MG: 75 CAPSULE ORAL at 19:51

## 2020-02-01 RX ADMIN — IPRATROPIUM BROMIDE AND ALBUTEROL SULFATE 3 ML: 2.5; .5 SOLUTION RESPIRATORY (INHALATION) at 14:38

## 2020-02-01 RX ADMIN — IPRATROPIUM BROMIDE AND ALBUTEROL SULFATE 3 ML: 2.5; .5 SOLUTION RESPIRATORY (INHALATION) at 11:07

## 2020-02-01 RX ADMIN — CHLORDIAZEPOXIDE HYDROCHLORIDE 25 MG: 25 CAPSULE ORAL at 17:24

## 2020-02-01 RX ADMIN — IPRATROPIUM BROMIDE AND ALBUTEROL SULFATE 3 ML: 2.5; .5 SOLUTION RESPIRATORY (INHALATION) at 20:57

## 2020-02-01 RX ADMIN — METHYLPREDNISOLONE SODIUM SUCCINATE 40 MG: 40 INJECTION, POWDER, FOR SOLUTION INTRAMUSCULAR; INTRAVENOUS at 15:49

## 2020-02-01 RX ADMIN — CHLORDIAZEPOXIDE HYDROCHLORIDE 25 MG: 25 CAPSULE ORAL at 11:17

## 2020-02-01 RX ADMIN — CHLORDIAZEPOXIDE HYDROCHLORIDE 25 MG: 25 CAPSULE ORAL at 00:41

## 2020-02-01 RX ADMIN — IPRATROPIUM BROMIDE AND ALBUTEROL SULFATE 3 ML: 2.5; .5 SOLUTION RESPIRATORY (INHALATION) at 07:46

## 2020-02-01 RX ADMIN — GUAIFENESIN 600 MG: 600 TABLET, EXTENDED RELEASE ORAL at 19:51

## 2020-02-01 NOTE — PROGRESS NOTES
[facility] Medicine Services  INPATIENT PROGRESS NOTE    Length of Stay: 1  Date of Admission: 1/29/2020  Primary Care Physician: Arabella Luu APRN    Subjective   Chief Complaint: shortness of breath    HPI:  59 year old  male with past medical history of COPD, HTN, chronic tobacco abuse who presented on 1/29/2020 with shortness of breath for three days.  He is currently admitted to the hospital for acute hypoxic respiratory failure related to COPD exacerbation and type A influenza.  During today's visit, he reports cough continues, but denies any nausea and vomiting.  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  Awaiting PT/OT evaluations.     Review of Systems   Constitutional: Positive for activity change. Negative for chills.   HENT: Positive for voice change. Negative for congestion, rhinorrhea and sore throat.    Respiratory: Positive for cough, shortness of breath and wheezing.    Cardiovascular: Negative for chest pain, palpitations and leg swelling.   Gastrointestinal: Negative for abdominal pain, diarrhea, nausea and vomiting.   Musculoskeletal: Positive for back pain. Negative for neck pain.   Skin: Negative for pallor.   Neurological: Negative for dizziness and weakness.   Psychiatric/Behavioral: Negative for confusion. The patient is not nervous/anxious.         All pertinent negatives and positives are as above. All other systems have been reviewed and are negative unless otherwise stated.     Objective    Temp:  [95.8 °F (35.4 °C)-96.9 °F (36.1 °C)] 96.5 °F (35.8 °C)  Heart Rate:  [] 98  Resp:  [18-20] 18  BP: (123-160)/(68-85) 143/73    Physical Exam   Constitutional: He is oriented to person, place, and time. He appears well-developed and well-nourished. No distress.   HENT:   Head: Normocephalic and atraumatic.   Right Ear: External ear normal.   Left Ear: External ear normal.   Nose:  Nose normal.   Eyes: Conjunctivae are normal. Right eye exhibits no discharge. Left eye exhibits no discharge. No scleral icterus.   Neck: Normal range of motion. Neck supple. No JVD present.   Cardiovascular: Normal rate, regular rhythm, normal heart sounds and intact distal pulses. Exam reveals no gallop and no friction rub.   No murmur heard.  Pulmonary/Chest: Effort normal. No stridor. No respiratory distress. He has decreased breath sounds in the right lower field and the left lower field. He has no wheezes. He has no rales.   Abdominal: Soft. Bowel sounds are normal. He exhibits no distension. There is no tenderness.   Musculoskeletal: Normal range of motion. He exhibits no edema.   Neurological: He is alert and oriented to person, place, and time.   Skin: Skin is warm and dry. He is not diaphoretic.   Psychiatric: He has a normal mood and affect. His behavior is normal.   Nursing note and vitals reviewed.          Results Review:  I have reviewed the labs, radiology results, and diagnostic studies.    Laboratory Data:   Results from last 7 days   Lab Units 02/01/20  0542 01/31/20  0650 01/30/20 0220 01/29/20  1740   SODIUM mmol/L 142 143 137 137   POTASSIUM mmol/L 4.3 4.3 4.4 3.4*   CHLORIDE mmol/L 102 104 99 92*   CO2 mmol/L 29.0 25.0 26.0 29.0   BUN mg/dL 23* 21* 18 19   CREATININE mg/dL 0.89 0.90 1.21 1.45*   GLUCOSE mg/dL 170* 168* 273* 134*   CALCIUM mg/dL 8.7 8.5* 8.5* 9.2   BILIRUBIN mg/dL  --   --   --  0.4   ALK PHOS U/L  --   --   --  68   ALT (SGPT) U/L  --   --   --  23   AST (SGOT) U/L  --   --   --  14   ANION GAP mmol/L 11.0 14.0 12.0 16.0*     Estimated Creatinine Clearance: 100.8 mL/min (by C-G formula based on SCr of 0.89 mg/dL).          Results from last 7 days   Lab Units 02/01/20  0542 01/31/20  0650 01/30/20 0220 01/29/20  1740   WBC 10*3/mm3 7.37 8.45 4.16 6.88   HEMOGLOBIN g/dL 12.9* 12.9* 13.3 15.1   HEMATOCRIT % 40.1 39.8 40.5 46.4   PLATELETS 10*3/mm3 175 161 134* 158     Results  from last 7 days   Lab Units 01/29/20  1740   INR  0.92       Culture Data:   No results found for: BLOODCX  No results found for: URINECX  Respiratory Culture   Date Value Ref Range Status   01/30/2020 Scant growth (1+) Normal Respiratory [place]  Preliminary     No results found for: WOUNDCX  No results found for: STOOLCX  No components found for: BODYFLD    Radiology Data:   Imaging Results (Last 24 Hours)     ** No results found for the last 24 hours. **          I have reviewed the patient's current medications.     Assessment/Plan     Active Hospital Problems    Diagnosis   • Type A influenza   • Acute respiratory failure with hypoxia (CMS/HCC)   • Hypertension   • Elevated cholesterol   • Tobacco abuse   • Hypokalemia   • COPD exacerbation (CMS/HCC)       Plan:    1. Acute hypoxic respiratory failure: related to COPD exacerbation and type A influenza.  Continue o2 support, Solumedrol, duonebs, Tamiflu.    2. COPD exacerbation: continue o2 support, Duonebs, Solumedrol.  Wean o2 as able.   3. HTN: continue HCTZ  4. Type A influenza: Tamiflu, fever management, antiemetics, O2 support as needed.   5. HLD:   6. Tobacco abuse: continue Nicotine patch.  7. Hypokalemia: resolved. Continue replacement PRN.  8. Deconditioning: Up to chair for all meals.  Patient has not ambulated in hallway as ordered.  PT/OT consults placed.     Discharge Planning: I expect patient to be discharged to home in 1-2 days.          This document has been electronically signed by CRISTOBAL Ross on February 1, 2020 1:14 PM

## 2020-02-02 LAB
ANION GAP SERPL CALCULATED.3IONS-SCNC: 9 MMOL/L (ref 5–15)
BUN BLD-MCNC: 20 MG/DL (ref 6–20)
BUN/CREAT SERPL: 22 (ref 7–25)
CALCIUM SPEC-SCNC: 8.7 MG/DL (ref 8.6–10.5)
CHLORIDE SERPL-SCNC: 102 MMOL/L (ref 98–107)
CO2 SERPL-SCNC: 28 MMOL/L (ref 22–29)
CREAT BLD-MCNC: 0.91 MG/DL (ref 0.76–1.27)
DEPRECATED RDW RBC AUTO: 45.6 FL (ref 37–54)
ERYTHROCYTE [DISTWIDTH] IN BLOOD BY AUTOMATED COUNT: 14.2 % (ref 12.3–15.4)
GFR SERPL CREATININE-BSD FRML MDRD: 86 ML/MIN/1.73
GLUCOSE BLD-MCNC: 169 MG/DL (ref 65–99)
HCT VFR BLD AUTO: 39.2 % (ref 37.5–51)
HGB BLD-MCNC: 12.9 G/DL (ref 13–17.7)
MCH RBC QN AUTO: 29.3 PG (ref 26.6–33)
MCHC RBC AUTO-ENTMCNC: 32.9 G/DL (ref 31.5–35.7)
MCV RBC AUTO: 88.9 FL (ref 79–97)
PLATELET # BLD AUTO: 194 10*3/MM3 (ref 140–450)
PMV BLD AUTO: 10.5 FL (ref 6–12)
POTASSIUM BLD-SCNC: 4.4 MMOL/L (ref 3.5–5.2)
RBC # BLD AUTO: 4.41 10*6/MM3 (ref 4.14–5.8)
SODIUM BLD-SCNC: 139 MMOL/L (ref 136–145)
WBC NRBC COR # BLD: 6.78 10*3/MM3 (ref 3.4–10.8)

## 2020-02-02 PROCEDURE — 97162 PT EVAL MOD COMPLEX 30 MIN: CPT | Performed by: PHYSICAL THERAPIST

## 2020-02-02 PROCEDURE — 94799 UNLISTED PULMONARY SVC/PX: CPT

## 2020-02-02 PROCEDURE — 80048 BASIC METABOLIC PNL TOTAL CA: CPT | Performed by: NURSE PRACTITIONER

## 2020-02-02 PROCEDURE — 25010000002 METHYLPREDNISOLONE PER 40 MG: Performed by: INTERNAL MEDICINE

## 2020-02-02 PROCEDURE — 85027 COMPLETE CBC AUTOMATED: CPT | Performed by: NURSE PRACTITIONER

## 2020-02-02 PROCEDURE — 97166 OT EVAL MOD COMPLEX 45 MIN: CPT

## 2020-02-02 RX ADMIN — HYDROCODONE BITARTRATE AND ACETAMINOPHEN 1 TABLET: 5; 325 TABLET ORAL at 13:52

## 2020-02-02 RX ADMIN — HYDROCODONE BITARTRATE AND ACETAMINOPHEN 1 TABLET: 5; 325 TABLET ORAL at 00:07

## 2020-02-02 RX ADMIN — HYDROCODONE BITARTRATE AND ACETAMINOPHEN 1 TABLET: 5; 325 TABLET ORAL at 20:36

## 2020-02-02 RX ADMIN — METHYLPREDNISOLONE SODIUM SUCCINATE 40 MG: 40 INJECTION, POWDER, FOR SOLUTION INTRAMUSCULAR; INTRAVENOUS at 23:34

## 2020-02-02 RX ADMIN — SODIUM CHLORIDE, PRESERVATIVE FREE 10 ML: 5 INJECTION INTRAVENOUS at 09:04

## 2020-02-02 RX ADMIN — CHLORDIAZEPOXIDE HYDROCHLORIDE 25 MG: 25 CAPSULE ORAL at 06:01

## 2020-02-02 RX ADMIN — IPRATROPIUM BROMIDE AND ALBUTEROL SULFATE 3 ML: 2.5; .5 SOLUTION RESPIRATORY (INHALATION) at 14:28

## 2020-02-02 RX ADMIN — CHLORDIAZEPOXIDE HYDROCHLORIDE 25 MG: 25 CAPSULE ORAL at 00:08

## 2020-02-02 RX ADMIN — METHYLPREDNISOLONE SODIUM SUCCINATE 40 MG: 40 INJECTION, POWDER, FOR SOLUTION INTRAMUSCULAR; INTRAVENOUS at 09:02

## 2020-02-02 RX ADMIN — OSELTAMIVIR PHOSPHATE 75 MG: 75 CAPSULE ORAL at 20:34

## 2020-02-02 RX ADMIN — METHYLPREDNISOLONE SODIUM SUCCINATE 40 MG: 40 INJECTION, POWDER, FOR SOLUTION INTRAMUSCULAR; INTRAVENOUS at 17:12

## 2020-02-02 RX ADMIN — GUAIFENESIN 600 MG: 600 TABLET, EXTENDED RELEASE ORAL at 20:33

## 2020-02-02 RX ADMIN — IPRATROPIUM BROMIDE AND ALBUTEROL SULFATE 3 ML: 2.5; .5 SOLUTION RESPIRATORY (INHALATION) at 20:46

## 2020-02-02 RX ADMIN — CHLORDIAZEPOXIDE HYDROCHLORIDE 25 MG: 25 CAPSULE ORAL at 11:41

## 2020-02-02 RX ADMIN — OSELTAMIVIR PHOSPHATE 75 MG: 75 CAPSULE ORAL at 09:02

## 2020-02-02 RX ADMIN — GUAIFENESIN 600 MG: 600 TABLET, EXTENDED RELEASE ORAL at 09:01

## 2020-02-02 RX ADMIN — CHLORDIAZEPOXIDE HYDROCHLORIDE 25 MG: 25 CAPSULE ORAL at 23:34

## 2020-02-02 RX ADMIN — NICOTINE 1 PATCH: 14 PATCH, EXTENDED RELEASE TRANSDERMAL at 17:13

## 2020-02-02 RX ADMIN — IPRATROPIUM BROMIDE AND ALBUTEROL SULFATE 3 ML: 2.5; .5 SOLUTION RESPIRATORY (INHALATION) at 10:45

## 2020-02-02 RX ADMIN — SODIUM CHLORIDE, PRESERVATIVE FREE 10 ML: 5 INJECTION INTRAVENOUS at 20:34

## 2020-02-02 RX ADMIN — METHYLPREDNISOLONE SODIUM SUCCINATE 40 MG: 40 INJECTION, POWDER, FOR SOLUTION INTRAMUSCULAR; INTRAVENOUS at 00:08

## 2020-02-02 RX ADMIN — CHLORDIAZEPOXIDE HYDROCHLORIDE 25 MG: 25 CAPSULE ORAL at 17:13

## 2020-02-02 RX ADMIN — IPRATROPIUM BROMIDE AND ALBUTEROL SULFATE 3 ML: 2.5; .5 SOLUTION RESPIRATORY (INHALATION) at 06:53

## 2020-02-02 NOTE — PLAN OF CARE
Problem: Patient Care Overview  Goal: Plan of Care Review  Outcome: Ongoing (interventions implemented as appropriate)  Flowsheets (Taken 2/2/2020 1313)  Outcome Summary: OT bj completed this date. Pt on 2L stayed mostly on 90 throughout. Pt was supervision for bed mobility with extended time. Pt was CGA for sit to stand off bed and toilet and min assist this date for mobily with a few LOB, pt was using a RW and stated it was not rolling well. Pt was SBA to don and doff socks on EOB. Pt could benefit from further skilled OT to reach PLOF/max independence with ADL including balance and endurance. Recommend 24/7 care with further therapy home health verse outpatient if goals not met.

## 2020-02-02 NOTE — THERAPY EVALUATION
Acute Care - Occupational Therapy Initial Evaluation  [facility]     Patient Name: Jeff Werner  : 1960  MRN: [id number]  Today's Date: 2020  Onset of Illness/Injury or Date of Surgery: 20  Date of Referral to OT: 20  Referring Physician: Madelyn JAIN    Admit Date: 2020       ICD-10-CM ICD-9-CM   1. COPD exacerbation (CMS/HCC) J44.1 491.21   2. Pulmonary nodule R91.1 793.11   3. Impaired physical mobility Z74.09 781.99   4. Impaired mobility and ADLs Z74.09 799.89     Patient Active Problem List   Diagnosis   • Right knee pain   • Degenerative disc disease, lumbar   • Internal derangement of right knee   • Chronic midline low back pain without sciatica   • Mechanical pain of right knee   • Numbness and tingling of left leg   • COPD exacerbation (CMS/[facility])   • Acute respiratory failure with hypoxia (CMS/[facility])   • Hypertension   • Elevated cholesterol   • Tobacco abuse   • Hypokalemia   • Type A influenza     Past Medical History:   Diagnosis Date   • Arthritis of back    • Asthma    • COPD (chronic obstructive pulmonary disease) (CMS/[facility])    • Degenerative disc disease, lumbar    • Elevated cholesterol    • Hypertension    • Lumbosacral disc disease     history of disc hernations in the lumbar spine     Past Surgical History:   Procedure Laterality Date   • COLONOSCOPY N/A 2018    Procedure: COLONOSCOPY;  Surgeon: Jovanny Landis DO;  Location: [facility] ENDOSCOPY;  Service: Gastroenterology          OT ASSESSMENT FLOWSHEET (last 12 hours)      Occupational Therapy Evaluation     Row Name 20 1313                   OT Evaluation Time/Intention    Subjective Information  complains of;pain  -        Document Type  evaluation  -        Mode of Treatment  individual therapy;occupational therapy  -        Total Evaluation Minutes, Occupational Therapy  40  -        Patient Effort  good  -        Symptoms Noted During/After Treatment  fatigue;increased pain   -        Comment  RN notified of session, RN came to give pain meds  -           General Information    Patient Profile Reviewed?  yes  -        Onset of Illness/Injury or Date of Surgery  01/29/20  -        Referring Physician  Madelyn JAIN  -        Patient Observations  alert;cooperative;agree to therapy  -        Patient/Family Observations  wife present   -        General Observations of Patient  pt was asleep but awoke, on O2 2L, IV, bed alarm   -        Prior Level of Function  independent:;all household mobility;transfer;bed mobility;ADL's;shopping;using stairs;work;community mobility wife does IADL   -        Equipment Currently Used at Home  none  -        Existing Precautions/Restrictions  fall droplet/contact   -        Limitations/Impairments  safety/cognitive  -        Risks Reviewed  patient:;spouse/S.O.:;LOB;dizziness;increased discomfort;change in vital signs  -        Benefits Reviewed  spouse/S.O.:;patient:;improve function;increase independence;increase strength;increase balance;increase knowledge  -           Relationship/Environment    Lives With  spouse  -           Resource/Environmental Concerns    Current Living Arrangements  home/apartment/condo  -           Home Main Entrance    Number of Stairs, Main Entrance  four  -        Stair Railings, Main Entrance  none  -           Stairs Within Home, Primary    Number of Stairs, Within Home, Primary  none  -           Cognitive Assessment/Interventions    Additional Documentation  Cognitive Assessment/Intervention (Group)  -           Cognitive Assessment/Intervention- PT/OT    Affect/Mental Status (Cognitive)  WFL  -        Orientation Status (Cognition)  oriented x 4  -        Follows Commands (Cognition)  over 90% accuracy;verbal cues/prompting required;repetition of directions required  -        Safety Deficit (Cognitive)  mild deficit  -        Personal Safety Interventions  fall  prevention program maintained;gait belt;nonskid shoes/slippers when out of bed;supervised activity  -           Safety Issues, Functional Mobility    Safety Issues Affecting Function (Mobility)  at risk behavior observed;awareness of need for assistance;impulsivity;insight into deficits/self awareness;judgment;problem solving;safety precaution awareness;safety precautions follow-through/compliance;sequencing abilities  -        Impairments Affecting Function (Mobility)  balance;coordination;endurance/activity tolerance;strength;shortness of breath;motor control;motor planning;pain  -           Bed Mobility Assessment/Treatment    Supine-Sit [place] (Bed Mobility)  supervision  -        Sit-Supine [place] (Bed Mobility)  supervision  -           Functional Mobility    Functional Mobility- Ind. Level  minimum assist (75% patient effort);nonverbal cues required (demo/gesture);verbal cues required  -        Functional Mobility- Device  rolling walker  -        Functional Mobility- Comment  pt unsteady and off balance, loosing his balance  -           Transfer Assessment/Treatment    Transfer Assessment/Treatment  stand-sit transfer;sit-stand transfer;toilet transfer  -        Comment (Transfers)  educated on hand placement for t/f   -           Sit-Stand Transfer    Sit-Stand [place] (Transfers)  contact guard;nonverbal cues (demo/gesture);verbal cues  -        Assistive Device (Sit-Stand Transfers)  walker, front-wheeled  -           Stand-Sit Transfer    Stand-Sit [place] (Transfers)  contact guard;nonverbal cues (demo/gesture);verbal cues  -        Assistive Device (Stand-Sit Transfers)  walker, front-wheeled  -           Toilet Transfer    Type (Toilet Transfer)  stand-sit;sit-stand  -        [place] Level (Toilet Transfer)  contact guard;nonverbal cues (demo/gesture);verbal cues  -        Assistive Device (Toilet Transfer)  grab bars/safety frame;walker,  front-wheeled  -           ADL Assessment/Intervention    BADL Assessment/Intervention  lower body dressing;bathing  -           Bathing Assessment/Intervention    Comment (Bathing)  educated on home safety, benefit of shower chair, have someone home do sponge bath if need to and sit on the Edge of tub if needed.   -           Lower Body Dressing Assessment/Training    Lower Body Dressing [place] Level  doff;don;socks;supervision;set up;verbal cues  -           BADL Safety/Performance    Impairments, [facility] Safety/Performance  balance;endurance/activity tolerance;coordination;motor control;motor planning;pain;strength;shortness of breath;trunk/postural control  -           General ROM    GENERAL ROM COMMENTS  BUE grossly WFL fair+ digit to thumb   -           MMT (Manual Muscle Testing)    General MMT Comments  BUE  grossly 4/5; BUE grossly 4/5   -           Motor Assessment/Interventions    Additional Documentation  Balance (Group)  -           Balance    Balance  static sitting balance;static standing balance;dynamic sitting balance;dynamic standing balance  -           Static Sitting Balance    Level of [place] (Unsupported Sitting, Static Balance)  supervision  -           Dynamic Sitting Balance    Level of [place], Reaches Outside Midline (Sitting, Dynamic Balance)  standby assist  -           Static Standing Balance    Level of [place] (Supported Standing, Static Balance)  contact guard assist  -           Dynamic Standing Balance    Level of [place], Reaches Outside Midline (Standing, Dynamic Balance)  minimal assist, 75% patient effort  -           Sensory Assessment/Intervention    Additional Documentation  Vision Assessment/Intervention (Group);Hearing Assessment (Group)  -           Hearing Assessment    Hearing Status  WFL  -           Vision Assessment/Intervention    Visual Impairment/Limitations  corrective lenses full time  -            Positioning and Restraints    Pre-Treatment Position  in bed  -        Post Treatment Position  bed  -        In Bed  notified ns;supine;call light within reach;encouraged to call for assist;exit alarm on;with family/caregiver;side rails up x2;with [facility]           Pain Assessment    Additional Documentation  Pain Scale: Numbers Pre/Post-Treatment (Group)  Merged with [facility]           Pain Scale: Numbers Pre/Post-Treatment    Pain Scale: Numbers, Pretreatment  6/10  -        Pain Scale: Numbers, Post-Treatment  6/10  Merged with [facility]        Pain Location  -- head/neck   -        Pre/Post Treatment Pain Comment  RN aware  -        Pain Intervention(s)  Medication (See MAR);Repositioned;Ambulation/increased activity;Distraction;Emotional support  Merged with [facility]           Plan of Care Review    Plan of Care Reviewed With  patient  -           Clinical Impression (OT)    Date of Referral to OT  02/01/20  -        OT Diagnosis  Impaired mobility and ADL   -        Prognosis (OT Eval)  fair  -        Functional Level at Time of Evaluation (OT Eval)  pt decreased endurance, strength, balance, safety and indepdence with ADL   -        Patient/Family Goals Statement (OT Eval)  to go home, return to work   -        Criteria for Skilled Therapeutic Interventions Met (OT Eval)  yes;treatment indicated  -        Rehab Potential (OT Eval)  fair, will monitor progress closely  -        Therapy Frequency (OT Eval)  other (see comments) 5-7 days a week   -        Predicted Duration of Therapy Intervention (Therapy Eval)  until d/c   -        Care Plan Review (OT)  evaluation/treatment results reviewed;care plan/treatment goals reviewed;risks/benefits reviewed;current/potential barriers reviewed;patient/other agree to care plan  -        Care Plan Review, Other Participant (OT Eval)  spouse  -        Anticipated Discharge Disposition (OT)  home with 24/7 care;home with OP services;home with home health  -           Vital Signs     Pretreatment Heart Rate (beats/min)  100  -BH        Intratreatment Heart Rate (beats/min)  122  -BH        Posttreatment Heart Rate (beats/min)  104  -BH        Pre SpO2 (%)  90 gk to 93 with cough and deep breathing   -        O2 Delivery Pre Treatment  nasal cannula  -BH        Intra SpO2 (%)  90  -BH        O2 Delivery Intra Treatment  nasal cannula  -BH        Post SpO2 (%)  92  -BH        O2 Delivery Post Treatment  nasal cannula  -BH        Pre Patient Position  Supine  -BH        Intra Patient Position  Sitting  -           Planned OT Interventions    Planned Therapy Interventions (OT Eval)  activity tolerance training;adaptive equipment training;BADL retraining;cognitive/visual perception retraining;functional balance retraining;IADL retraining;neuromuscular control/coordination retraining;occupation/activity based interventions;passive ROM/stretching;patient/caregiver education/training;ROM/therapeutic exercise;strengthening exercise;transfer/mobility retraining  -           OT Goals    Transfer Goal Selection (OT)  transfer, OT goal 1  -        Bathing Goal Selection (OT)  bathing, OT goal 1  -        Dressing Goal Selection (OT)  dressing, OT goal 1  -        Toileting Goal Selection (OT)  --  -        Endurance Goal Selection (OT)  endurance, OT goal 1  -        Functional Mobility Goal Selection (OT)  functional mobility, OT goal 1  -        Additional Documentation  Endurance Goal Selection (OT) (Row);Functional Mobility Selection (OT) (Row)  -           Transfer Goal 1 (OT)    Activity/Assistive Device (Transfer Goal 1, OT)  toilet  -        [place] Level/Cues Needed (Transfer Goal 1, OT)  supervision required  -        Time Frame (Transfer Goal 1, OT)  long term goal (LTG);by discharge  -        Progress/Outcome (Transfer Goal 1, OT)  goal not met  -           Bathing Goal 1 (OT)    Activity/Assistive Device (Bathing Goal 1, OT)  bathing skills, all  -         [place] Level/Cues Needed (Bathing Goal 1, OT)  set-up required;supervision required  -        Time Frame (Bathing Goal 1, OT)  long term goal (LTG);by discharge  -        Progress/Outcomes (Bathing Goal 1, OT)  goal not met  -           Dressing Goal 1 (OT)    Activity/Assistive Device (Dressing Goal 1, OT)  dressing skills, all  -        [place]/Cues Needed (Dressing Goal 1, OT)  set-up required;supervision required  -        Time Frame (Dressing Goal 1, OT)  long term goal (LTG);by discharge  -        Progress/Outcome (Dressing Goal 1, OT)  goal not met  -            Endurance Goal 1 (OT)    Activity Level (Endurance Goal 1, OT)  endurance 2 fair + 25 min functional task 2 or less rest breaks O2 90 or up  -        Time Frame (Endurance Goal 1, OT)  long term goal (LTG);by discharge  -        Progress/Outcome (Endurance Goal 1, OT)  goal not met  -           Functional Mobility Goal 1 (OT)    [place] Level/Cues Needed (Functional Mobility Goal 1, OT)  supervision required  -        Distance Goal 1 (Functional Mobility, OT)  for ADL tasks no LOB and good safety  -        Time Frame (Functional Mobility Goal 1, OT)  long term goal (LTG);by discharge  -        Progress/Outcome (Functional Mobility Goal 1, OT)  goal not met  -           Patient Education Goal (OT)    Activity (Patient Education Goal, OT)  Pt will communicate good home safety awareness.   -        [place]/Cues/Accuracy (Memory Goal 2, OT)  verbalizes understanding  -        Time Frame (Patient Education Goal, OT)  long term goal (LTG);by discharge  -        Progress/Outcome (Patient Education Goal, OT)  goal not met  -           Living Environment    Home Accessibility  stairs to enter home;tub/shower is not walk in  -          User Key  (r) = Recorded By, (t) = Taken By, (c) = Cosigned By    Initials Name Effective Dates     Lashaun Sol, OTR/L 06/08/18 -                OT Recommendation  and Plan  Outcome Summary/Treatment Plan (OT)  Anticipated Discharge Disposition (OT): home with 24/7 care, home with OP services, home with home health  Planned Therapy Interventions (OT Eval): activity tolerance training, adaptive equipment training, BADL retraining, cognitive/visual perception retraining, functional balance retraining, IADL retraining, neuromuscular control/coordination retraining, occupation/activity based interventions, passive ROM/stretching, patient/caregiver education/training, ROM/therapeutic exercise, strengthening exercise, transfer/mobility retraining  Therapy Frequency (OT Eval): other (see comments)(5-7 days a week )  Plan of Care Review  Plan of Care Reviewed With: patient  Plan of Care Reviewed With: patient  Outcome Summary: OT eval completed this date. Pt on 2L stayed mostly on 90 throughout. Pt was supervision for bed mobility with extended time. Pt was CGA for sit to stand off bed and toilet and min assist this date for mobily with a few LOB, pt was using a RW and stated it was not rolling well. Pt was SBA to don and doff socks on EOB. Pt could benefit from further skilled OT to reach PLOF/max independence with ADL including balance and endurance. Recommend 24/7 care with further therapy home health verse outpatient if goals not met.    Outcome Measures     Row Name 02/02/20 1313             How much help from another is currently needed...    Putting on and taking off regular lower body clothing?  3  -BH      Bathing (including washing, rinsing, and drying)  3  -BH      Toileting (which includes using toilet bed pan or urinal)  3  -BH      Putting on and taking off regular upper body clothing  3  -BH      Taking care of personal grooming (such as brushing teeth)  4  -BH      Eating meals  4  -BH      AM-PAC 6 Clicks Score (OT)  20  -BH         Functional Assessment    Outcome Measure Options  AM-PAC 6 Clicks Daily Activity (OT)  -BH        User Key  (r) = Recorded By, (t) =  Taken By, (c) = Cosigned By    Initials Name Provider Type     Lashaun Sol, OTR/L Occupational Therapist          Time Calculation:   Time Calculation- OT     Row Name 02/02/20 1519             Time Calculation-     OT Start Time  1313  -      OT Stop Time  1353  -      OT Time Calculation (min)  40 min  -      OT Received On  02/02/20  -      OT Goal Re-Cert Due Date  02/15/20  -        User Key  (r) = Recorded By, (t) = Taken By, (c) = Cosigned By    Initials Name Provider Type     Lashaun Sol, OTR/L Occupational Therapist        Therapy Charges for Today     Code Description Service Date Service Provider Modifiers Qty    [id number]  OT EVAL MOD COMPLEXITY 3 2/2/2020 Lashaun Sol OTR/L GO 1               Lashaun Sol OTR/JUNIE  2/2/2020

## 2020-02-02 NOTE — PROGRESS NOTES
[facility] Medicine Services  INPATIENT PROGRESS NOTE    Length of Stay: 2  Date of Admission: 1/29/2020  Primary Care Physician: Arabella Luu APRN    Subjective   Chief Complaint: shortness of breath    HPI:  59 year old  male with past medical history of COPD, HTN, chronic tobacco abuse who presented on 1/29/2020 with shortness of breath for three days.  He is currently admitted to the hospital for acute hypoxic respiratory failure related to COPD exacerbation and type A influenza.  During today's visit, he reports cough continues.  Still experiencing desaturations with activity.  Currently 89-90% while off of oxygen at rest.     Review of Systems   Constitutional: Positive for activity change. Negative for chills.   HENT: Positive for voice change. Negative for congestion, rhinorrhea and sore throat.    Respiratory: Positive for cough and shortness of breath. Negative for wheezing.    Cardiovascular: Negative for chest pain, palpitations and leg swelling.   Gastrointestinal: Negative for abdominal pain, diarrhea, nausea and vomiting.   Musculoskeletal: Positive for back pain. Negative for neck pain.   Skin: Negative for pallor.   Neurological: Negative for dizziness and weakness.   Psychiatric/Behavioral: Negative for confusion. The patient is not nervous/anxious.         All pertinent negatives and positives are as above. All other systems have been reviewed and are negative unless otherwise stated.     Objective    Temp:  [96 °F (35.6 °C)-97.1 °F (36.2 °C)] 97.1 °F (36.2 °C)  Heart Rate:  [] 99  Resp:  [16-20] 18  BP: (121-146)/(65-78) 121/70    Physical Exam   Constitutional: He is oriented to person, place, and time. He appears well-developed and well-nourished. No distress.   HENT:   Head: Normocephalic and atraumatic.   Right Ear: External ear normal.   Left Ear: External ear normal.   Nose: Nose normal.   Eyes: Conjunctivae are normal. Right  eye exhibits no discharge. Left eye exhibits no discharge. No scleral icterus.   Neck: Normal range of motion. Neck supple. No JVD present.   Cardiovascular: Normal rate, regular rhythm, normal heart sounds and intact distal pulses. Exam reveals no gallop and no friction rub.   No murmur heard.  Pulmonary/Chest: Effort normal. No stridor. No respiratory distress. He has decreased breath sounds in the right lower field and the left lower field. He has no wheezes. He has no rales.   Abdominal: Soft. Bowel sounds are normal. He exhibits no distension. There is no tenderness.   Musculoskeletal: Normal range of motion. He exhibits no edema.   Neurological: He is alert and oriented to person, place, and time.   Skin: Skin is warm and dry. He is not diaphoretic.   Psychiatric: He has a normal mood and affect. His behavior is normal.   Nursing note and vitals reviewed.          Results Review:  I have reviewed the labs, radiology results, and diagnostic studies.    Laboratory Data:   Results from last 7 days   Lab Units 02/02/20  0600 02/01/20  0542 01/31/20  0650  01/29/20  1740   SODIUM mmol/L 139 142 143   < > 137   POTASSIUM mmol/L 4.4 4.3 4.3   < > 3.4*   CHLORIDE mmol/L 102 102 104   < > 92*   CO2 mmol/L 28.0 29.0 25.0   < > 29.0   BUN mg/dL 20 23* 21*   < > 19   CREATININE mg/dL 0.91 0.89 0.90   < > 1.45*   GLUCOSE mg/dL 169* 170* 168*   < > 134*   CALCIUM mg/dL 8.7 8.7 8.5*   < > 9.2   BILIRUBIN mg/dL  --   --   --   --  0.4   ALK PHOS U/L  --   --   --   --  68   ALT (SGPT) U/L  --   --   --   --  23   AST (SGOT) U/L  --   --   --   --  14   ANION GAP mmol/L 9.0 11.0 14.0   < > 16.0*    < > = values in this interval not displayed.     Estimated Creatinine Clearance: 99.8 mL/min (by C-G formula based on SCr of 0.91 mg/dL).          Results from last 7 days   Lab Units 02/02/20  0600 02/01/20  0542 01/31/20  0650 01/30/20  0220 01/29/20  1740   WBC 10*3/mm3 6.78 7.37 8.45 4.16 6.88   HEMOGLOBIN g/dL 12.9* 12.9* 12.9*  13.3 15.1   HEMATOCRIT % 39.2 40.1 39.8 40.5 46.4   PLATELETS 10*3/mm3 194 175 161 134* 158     Results from last 7 days   Lab Units 01/29/20  1740   INR  0.92       Culture Data:   No results found for: BLOODCX  No results found for: URINECX  Respiratory Culture   Date Value Ref Range Status   01/30/2020 Scant growth (1+) Normal Respiratory [place]  Final     No results found for: WOUNDCX  No results found for: STOOLCX  No components found for: BODYFLD    Radiology Data:   Imaging Results (Last 24 Hours)     ** No results found for the last 24 hours. **          I have reviewed the patient's current medications.     Assessment/Plan     Active Hospital Problems    Diagnosis   • Type A influenza   • Acute respiratory failure with hypoxia (CMS/HCC)   • Hypertension   • Elevated cholesterol   • Tobacco abuse   • Hypokalemia   • COPD exacerbation (CMS/HCC)       Plan:    1. Acute hypoxic respiratory failure: related to COPD exacerbation and type A influenza.  Continue o2 support PRN, Solumedrol, duonebs, Tamiflu.  Mobilize with PT/OT.  Check room air saturations at rest and with activity as patient may require home oxygen.   2. COPD exacerbation: continue o2 support PRN, Duonebs, Solumedrol.  Wean o2 as able.   3. HTN: continue HCTZ.  4. Type A influenza: Tamiflu, fever management, antiemetics, O2 support as needed.   5. HLD:   6. Tobacco abuse: continue Nicotine patch.  7. Hypokalemia: resolved. Continue replacement PRN.  8. Deconditioning: Up to chair for all meals.  PT/OT    Discharge Planning: I expect patient to be discharged to home in 1-2 days.          This document has been electronically signed by CRISTOBAL Ross on February 2, 2020 12:57 PM

## 2020-02-02 NOTE — PLAN OF CARE
Problem: Patient Care Overview  Goal: Plan of Care Review  Outcome: Ongoing (interventions implemented as appropriate)  Flowsheets (Taken 2/2/2020 1312)  Plan of Care Reviewed With: patient  Outcome Summary: PT bj nguyen. Was able to come to sit EOB with HOB up and supervision, did no have any O2 on and O2 was at 87- contacted RN who advised to put O2 back on and was able to recover to 90%, was able to come to stand EOB and ambulate into bathroom and then around in room without any device and supervision of one,, declined wanting to sit in recliner at this time, could benefit from skilled PT toregain and return to highest level of function in bed mobility, transfer and gait, once no longer in contact precautio n can proceed to steps

## 2020-02-02 NOTE — THERAPY EVALUATION
Patient Name: Jeff Werner  : 1960    MRN: [id number]                              Today's Date: 2020       Admit Date: 2020    Visit Dx:     ICD-10-CM ICD-9-CM   1. COPD exacerbation (CMS/HCC) J44.1 491.21   2. Pulmonary nodule R91.1 793.11   3. Impaired physical mobility Z74.09 781.99     Patient Active Problem List   Diagnosis   • Right knee pain   • Degenerative disc disease, lumbar   • Internal derangement of right knee   • Chronic midline low back pain without sciatica   • Mechanical pain of right knee   • Numbness and tingling of left leg   • COPD exacerbation (CMS/HCC)   • Acute respiratory failure with hypoxia (CMS/HCC)   • Hypertension   • Elevated cholesterol   • Tobacco abuse   • Hypokalemia   • Type A influenza     Past Medical History:   Diagnosis Date   • Arthritis of back    • Asthma    • COPD (chronic obstructive pulmonary disease) (CMS/HCC)    • Degenerative disc disease, lumbar    • Elevated cholesterol    • Hypertension    • Lumbosacral disc disease     history of disc hernations in the lumbar spine     Past Surgical History:   Procedure Laterality Date   • COLONOSCOPY N/A 2018    Procedure: COLONOSCOPY;  Surgeon: Jovanny Landis DO;  Location: [facility] ENDOSCOPY;  Service: Gastroenterology     General Information     Row Name 20 7023          PT Evaluation Time/Intention    Document Type  evaluation  -CB     Mode of Treatment  individual therapy;physical therapy  -CB     Row Name 20 4462          General Information    Patient Profile Reviewed?  yes  -CB     Prior Level of Function  independent:;gait;transfer;ADL's  -CB     Existing Precautions/Restrictions  other (see comments) droplet precautions  -CB     Barriers to Rehab  none identified  -CB     Row Name 2093          Relationship/Environment    Lives With  spouse  -CB     Row Name 2025          Resource/Environmental Concerns    Current Living Arrangements  home/apartment/condo  -CB      Row Name 02/02/20 0950          Home Main Entrance    Number of Stairs, Main Entrance  four  -CB     Stair Railings, Main Entrance  none  -CB     Row Name 02/02/20 0950          Stairs Within Home, Primary    Number of Stairs, Within Home, Primary  none  -CB     Row Name 02/02/20 0950          Cognitive Assessment/Intervention- PT/OT    Orientation Status (Cognition)  oriented x 4  -CB     Row Name 02/02/20 0950          Safety Issues, Functional Mobility    Impairments Affecting Function (Mobility)  endurance/activity tolerance  -CB       User Key  (r) = Recorded By, (t) = Taken By, (c) = Cosigned By    Initials Name Provider Type    Livia Owens, PT Physical Therapist        Mobility     Row Name 02/02/20 0950          Bed Mobility Assessment/Treatment    Bed Mobility Assessment/Treatment  supine-sit;sit-supine  -CB     Supine-Sit [place] (Bed Mobility)  supervision  -CB     Sit-Supine [place] (Bed Mobility)  supervision  -CB     Assistive Device (Bed Mobility)  head of bed elevated  -CB     Comment (Bed Mobility)  sleeps on couch at home  -CB     Row Name 02/02/20 0950          Sit-Stand Transfer    Sit-Stand [place] (Transfers)  contact guard  -CB     Assistive Device (Sit-Stand Transfers)  -- none  -CB     Row Name 02/02/20 0950          Gait/Stairs Assessment/Training    Gait/Stairs Assessment/Training  gait/ambulation independence;gait/ambulation assistive device;distance ambulated  -CB     [place] Level (Gait)  contact guard  -CB     Assistive Device (Gait)  -- none  -CB     Distance in Feet (Gait)  20 feet- on isolation and cannot leave room  -CB       User Key  (r) = Recorded By, (t) = Taken By, (c) = Cosigned By    Initials Name Provider Type    Livia Owens PT Physical Therapist        Obj/Interventions     Row Name 02/02/20 0950          General ROM    GENERAL ROM COMMENTS  AROM WFL BLE  -CB     Row Name 02/02/20 0950          MMT (Manual Muscle Testing)    General  MMT Comments  grossly BLE 4/5  -CB     Row Name 02/02/20 0950          Static Sitting Balance    Level of [place] (Unsupported Sitting, Static Balance)  supervision  -CB     Sitting Position (Unsupported Sitting, Static Balance)  sitting on edge of bed  -CB     Time Able to Maintain Position (Unsupported Sitting, Static Balance)  45 to 60 seconds  -CB     Row Name 02/02/20 0950          Dynamic Sitting Balance    Level of [place], Reaches Outside Midline (Sitting, Dynamic Balance)  supervision  -CB     Sitting Position, Reaches Outside Midline (Sitting, Dynamic Balance)  sitting on edge of bed  -CB     Row Name 02/02/20 0950          Static Standing Balance    Level of [place] (Supported Standing, Static Balance)  standby assist  -CB     Time Able to Maintain Position (Supported Standing, Static Balance)  45 to 60 seconds  -CB     Assistive Device Utilized (Supported Standing, Static Balance)  -- none  -CB     Row Name 02/02/20 0950          Sensory Assessment/Intervention    Sensory General Assessment  no sensation deficits identified  -CB       User Key  (r) = Recorded By, (t) = Taken By, (c) = Cosigned By    Initials Name Provider Type    CB Livia Chowdhury, PT Physical Therapist        Goals/Plan     Row Name 02/02/20 0950          Bed Mobility Goal 1 (PT)    Activity/Assistive Device (Bed Mobility Goal 1, PT)  sit to supine;supine to sit HOB down and no rails  -CB     [place] Level/Cues Needed (Bed Mobility Goal 1, PT)  independent  -CB     Time Frame (Bed Mobility Goal 1, PT)  3 days  -CB     Progress/Outcomes (Bed Mobility Goal 1, PT)  goal not met  -CB     Row Name 02/02/20 0950          Transfer Goal 1 (PT)    Activity/Assistive Device (Transfer Goal 1, PT)  sit-to-stand/stand-to-sit;bed-to-chair/chair-to-bed;other (see comments) no device  -CB     [place] Level/Cues Needed (Transfer Goal 1, PT)  independent  -CB     Time Frame (Transfer Goal 1, PT)  2 - 3 days  -CB      Progress/Outcome (Transfer Goal 1, PT)  goal not met  -CB     Row Name 02/02/20 0950          Gait Training Goal 1 (PT)    Activity/Assistive Device (Gait Training Goal 1, PT)  gait (walking locomotion);assistive device use;decrease fall risk;increase endurance/gait distance;turning, left;turning, right  -CB     [place] Level (Gait Training Goal 1, PT)  independent  -CB     Distance (Gait Goal 1, PT)  100 feet  -CB     Time Frame (Gait Training Goal 1, PT)  3 days  -CB     Progress/Outcome (Gait Training Goal 1, PT)  goal not met  -CB       User Key  (r) = Recorded By, (t) = Taken By, (c) = Cosigned By    Initials Name Provider Type    Livia Owens, PT Physical Therapist        Clinical Impression     Row Name 02/02/20 0950          Pain Assessment    Additional Documentation  Pain Scale: Numbers Pre/Post-Treatment (Group)  -CB     Row Name 02/02/20 0950          Pain Scale: Numbers Pre/Post-Treatment    Pain Scale: Numbers, Pretreatment  4/10  -CB     Pain Scale: Numbers, Post-Treatment  5/10  -CB     Pain Location  head  -CB     Pre/Post Treatment Pain Comment  patient declined any pain meds for headache at this time  -CB     Pain Intervention(s)  Ambulation/increased activity  -CB     Row Name 02/02/20 0950          Plan of Care Review    Plan of Care Reviewed With  patient  -CB     Row Name 02/02/20 0950          Physical Therapy Clinical Impression    Criteria for Skilled Interventions Met (PT Clinical Impression)  yes;treatment indicated  -CB     Rehab Potential (PT Clinical Summary)  good, to achieve stated therapy goals  -CB     Predicted Duration of Therapy (PT)  until goals met or discharged  -CB     Row Name 02/02/20 0950          Vital Signs    Pre Systolic BP Rehab  140  -CB     Pre Treatment Diastolic BP  74  -CB     Post Systolic BP Rehab  150  -CB     Post Treatment Diastolic BP  80  -CB     Pretreatment Heart Rate (beats/min)  97  -CB     Posttreatment Heart Rate (beats/min)  94  -CB      Pre SpO2 (%)  87  -CB     O2 Delivery Pre Treatment  room air  -CB     Intra SpO2 (%)  90  -CB     O2 Delivery Intra Treatment  supplemental O2  -CB     Post SpO2 (%)  92  -CB     O2 Delivery Post Treatment  supplemental O2  -CB     Pre Patient Position  Supine  -CB     Intra Patient Position  Standing  -CB     Post Patient Position  Supine  -CB       User Key  (r) = Recorded By, (t) = Taken By, (c) = Cosigned By    Initials Name Provider Type    Livia Owens, PT Physical Therapist        Outcome Measures     Row Name 02/02/20 0950          How much help from another person do you currently need...    Turning from your back to your side while in flat bed without using bedrails?  4  -CB     Moving from lying on back to sitting on the side of a flat bed without bedrails?  4  -CB     Moving to and from a bed to a chair (including a wheelchair)?  3  -CB     Standing up from a chair using your arms (e.g., wheelchair, bedside chair)?  3  -CB     Climbing 3-5 steps with a railing?  3  -CB     To walk in hospital room?  3  -CB     AM-PAC 6 Clicks Score (PT)  20  -CB     Row Name 02/02/20 0950          Functional Assessment    Outcome Measure Options  AM-PAC 6 Clicks Basic Mobility (PT)  -CB       User Key  (r) = Recorded By, (t) = Taken By, (c) = Cosigned By    Initials Name Provider Type    Livia Owens, PT Physical Therapist          PT Recommendation and Plan  Planned Therapy Interventions (PT Eval): bed mobility training, gait training, home exercise program, stair training, strengthening, transfer training  Outcome Summary/Treatment Plan (PT)  Anticipated Equipment Needs at Discharge (PT): (none)  Anticipated Discharge Disposition (PT): home with assist  Plan of Care Reviewed With: patient  Outcome Summary: PT eval compleed. Was able to come to sit EOB with HOB up and supervision, did no have any O2 on and O2 was at 87- contacted RN who advised to put O2 back on and was able to recover to 90%, was able to  come to stand EOB and ambulate into bathroom and then around in room without any device and supervision of one,, declined wanting to sit in recliner at this time, could benefit from skilled PT toregain and return to highest level of function in bed mobility, transfer and gait, once no longer in contact precautio n can proceed to steps     Time Calculation:   PT Charges     Row Name 02/02/20 1317             Time Calculation    Start Time  0950  -CB      Stop Time  1020  -CB      Time Calculation (min)  30 min  -CB      PT Received On  02/02/20  -CB      PT Goal Re-Cert Due Date  02/15/20  -CB        User Key  (r) = Recorded By, (t) = Taken By, (c) = Cosigned By    Initials Name Provider Type    Livia Owens, PT Physical Therapist        Therapy Charges for Today     Code Description Service Date Service Provider Modifiers Qty    78855307263 HC PT EVAL MOD COMPLEXITY 2 2/2/2020 Livia Chowdhury PT GP 1          PT G-Codes  Outcome Measure Options: AM-PAC 6 Clicks Basic Mobility (PT)  AM-PAC 6 Clicks Score (PT): 20    Livia Chowdhury PT  2/2/2020

## 2020-02-02 NOTE — PLAN OF CARE
Problem: Patient Care Overview  Goal: Plan of Care Review  Outcome: Ongoing (interventions implemented as appropriate)   Pt ps02 87 on RA at rest, o2 at 2L reapplied, pt rebounded to 90% on 2L before PT. Pt still weak, but states he feels some better.

## 2020-02-03 VITALS
RESPIRATION RATE: 18 BRPM | HEART RATE: 100 BPM | BODY MASS INDEX: 25.73 KG/M2 | HEIGHT: 69 IN | WEIGHT: 173.7 LBS | OXYGEN SATURATION: 91 % | TEMPERATURE: 97.1 F | DIASTOLIC BLOOD PRESSURE: 88 MMHG | SYSTOLIC BLOOD PRESSURE: 137 MMHG

## 2020-02-03 LAB
ANION GAP SERPL CALCULATED.3IONS-SCNC: 10 MMOL/L (ref 5–15)
BUN BLD-MCNC: 22 MG/DL (ref 6–20)
BUN/CREAT SERPL: 25.6 (ref 7–25)
CALCIUM SPEC-SCNC: 8.9 MG/DL (ref 8.6–10.5)
CHLORIDE SERPL-SCNC: 100 MMOL/L (ref 98–107)
CO2 SERPL-SCNC: 28 MMOL/L (ref 22–29)
CREAT BLD-MCNC: 0.86 MG/DL (ref 0.76–1.27)
DEPRECATED RDW RBC AUTO: 45.3 FL (ref 37–54)
ERYTHROCYTE [DISTWIDTH] IN BLOOD BY AUTOMATED COUNT: 14.2 % (ref 12.3–15.4)
GFR SERPL CREATININE-BSD FRML MDRD: 92 ML/MIN/1.73
GLUCOSE BLD-MCNC: 166 MG/DL (ref 65–99)
HCT VFR BLD AUTO: 41.6 % (ref 37.5–51)
HGB BLD-MCNC: 13.6 G/DL (ref 13–17.7)
MCH RBC QN AUTO: 29 PG (ref 26.6–33)
MCHC RBC AUTO-ENTMCNC: 32.7 G/DL (ref 31.5–35.7)
MCV RBC AUTO: 88.7 FL (ref 79–97)
PLATELET # BLD AUTO: 220 10*3/MM3 (ref 140–450)
PMV BLD AUTO: 10.1 FL (ref 6–12)
POTASSIUM BLD-SCNC: 4.6 MMOL/L (ref 3.5–5.2)
RBC # BLD AUTO: 4.69 10*6/MM3 (ref 4.14–5.8)
SODIUM BLD-SCNC: 138 MMOL/L (ref 136–145)
WBC NRBC COR # BLD: 8.58 10*3/MM3 (ref 3.4–10.8)

## 2020-02-03 PROCEDURE — 94799 UNLISTED PULMONARY SVC/PX: CPT

## 2020-02-03 PROCEDURE — 25010000002 METHYLPREDNISOLONE PER 40 MG: Performed by: INTERNAL MEDICINE

## 2020-02-03 PROCEDURE — 97116 GAIT TRAINING THERAPY: CPT

## 2020-02-03 PROCEDURE — 97530 THERAPEUTIC ACTIVITIES: CPT

## 2020-02-03 PROCEDURE — 85027 COMPLETE CBC AUTOMATED: CPT | Performed by: NURSE PRACTITIONER

## 2020-02-03 PROCEDURE — 80048 BASIC METABOLIC PNL TOTAL CA: CPT | Performed by: NURSE PRACTITIONER

## 2020-02-03 PROCEDURE — 94760 N-INVAS EAR/PLS OXIMETRY 1: CPT

## 2020-02-03 RX ORDER — PREDNISONE 10 MG/1
TABLET ORAL
Qty: 20 TABLET | Refills: 0 | Status: SHIPPED | OUTPATIENT
Start: 2020-02-03 | End: 2021-03-11

## 2020-02-03 RX ORDER — GUAIFENESIN 600 MG/1
600 TABLET, EXTENDED RELEASE ORAL EVERY 12 HOURS SCHEDULED
Start: 2020-02-03 | End: 2021-10-18

## 2020-02-03 RX ORDER — OSELTAMIVIR PHOSPHATE 75 MG/1
75 CAPSULE ORAL EVERY 12 HOURS SCHEDULED
Qty: 2 CAPSULE | Refills: 0 | Status: SHIPPED | OUTPATIENT
Start: 2020-02-03 | End: 2020-02-04

## 2020-02-03 RX ORDER — ACETAMINOPHEN 325 MG/1
650 TABLET ORAL EVERY 6 HOURS PRN
Status: DISCONTINUED | OUTPATIENT
Start: 2020-02-03 | End: 2020-02-03 | Stop reason: HOSPADM

## 2020-02-03 RX ADMIN — GUAIFENESIN 600 MG: 600 TABLET, EXTENDED RELEASE ORAL at 09:49

## 2020-02-03 RX ADMIN — OSELTAMIVIR PHOSPHATE 75 MG: 75 CAPSULE ORAL at 09:49

## 2020-02-03 RX ADMIN — IPRATROPIUM BROMIDE AND ALBUTEROL SULFATE 3 ML: 2.5; .5 SOLUTION RESPIRATORY (INHALATION) at 07:26

## 2020-02-03 RX ADMIN — METHYLPREDNISOLONE SODIUM SUCCINATE 40 MG: 40 INJECTION, POWDER, FOR SOLUTION INTRAMUSCULAR; INTRAVENOUS at 09:49

## 2020-02-03 RX ADMIN — SODIUM CHLORIDE, PRESERVATIVE FREE 10 ML: 5 INJECTION INTRAVENOUS at 09:50

## 2020-02-03 RX ADMIN — CHLORDIAZEPOXIDE HYDROCHLORIDE 25 MG: 25 CAPSULE ORAL at 05:27

## 2020-02-03 RX ADMIN — IPRATROPIUM BROMIDE AND ALBUTEROL SULFATE 3 ML: 2.5; .5 SOLUTION RESPIRATORY (INHALATION) at 11:30

## 2020-02-03 RX ADMIN — ACETAMINOPHEN 650 MG: 325 TABLET, FILM COATED ORAL at 10:09

## 2020-02-03 NOTE — PAYOR COMM NOTE
"Rajni Masters   [facility]  phone [phone]  fax [phone]    Jeff Werner (59 y.o. Male)     Date of Birth Social Security Number Address Home Phone MRN    1960  [address] [address] [phone] [id number]    [place] Marital Status          None        Admission Date Admission Type Admitting Provider Attending Provider Department, Room/Bed    1/29/20 Emergency Chet Faustin, Cedric Mei MD [facility] 3 EAST, 367/1    Discharge Date Discharge Disposition Discharge Destination         Home-Health Care Svc              Attending Provider:  Cedric Greenwood MD    Allergies:  Carvedilol, Losartan, Lisinopril    Isolation:  Droplet   Infection:  Influenza (01/30/20)   Code Status:  CPR    Ht:  175.3 cm (69\")   Wt:  78.8 kg (173 lb 11.2 oz)    Admission Cmt:  None   Principal Problem:  None                Active Insurance as of 1/29/2020     Primary Coverage     Payor Plan Insurance Group Employer/Plan Group    [employer] [place] [employer] [place]ICA[place]      Payor Plan Address Payor Plan Phone Number Payor Plan Fax Number Effective Dates    [address] [address] [phone]  9/12/2018 - None Entered    [facility] [id number]       Subscriber Name Subscriber Birth Date Member ID       JEFF WERNER 1960 [id number]                 Emergency Contacts      (Rel.) Home Phone Work Phone Mobile Phone    Scarlett Werner (Spouse) [phone] -- [phone]            Discharge Summary    No notes of this type exist for this encounter.         Discharge Order (From admission, onward)     Start     Ordered    02/03/20 1024  Discharge patient  Once     Expected Discharge Date:  02/03/20    Discharge Disposition:  Home-Health Care Svc    Physician of Record for Attribution - Please select from Treatment Team:  ZHEN MENON [[id number]]    Review needed by CMO to determine Physician of " Record:  No    Please choose which facility the patient is currently admitted if they are being discharged to another facility or unit.:  [facility]    Mode:  Family       Question Answer Comment   Physician of Record for Attribution - Please select from Treatment Team ZHEN MENON    Review needed by CMO to determine Physician of Record No    Please choose which facility the patient is currently admitted if they are being discharged to another facility or unit. [facility]    Mode: Family        02/03/20 1024

## 2020-02-03 NOTE — DISCHARGE SUMMARY
[facility] Medicine Services  DISCHARGE SUMMARY       Date of Admission: 1/29/2020  Date of Discharge:  2/3/2020  Primary Care Physician: Arabella Luu APRN    Presenting Problem/History of Present Illness:  Pulmonary nodule [R91.1]  COPD exacerbation (CMS/HCC) [J44.1]  COPD exacerbation (CMS/HCC) [J44.1]       Final Discharge Diagnoses:  Active Hospital Problems    Diagnosis   • Type A influenza   • Acute respiratory failure with hypoxia (CMS/HCC)   • Hypertension   • Elevated cholesterol   • Tobacco abuse   • Hypokalemia   • COPD exacerbation (CMS/HCC)       Consults:   Consults     Date and Time Order Name Status Description    1/29/2020 2139 Hospitalist (on-call MD unless specified)            Procedures Performed:                 Pertinent Test Results:   Lab Results (last 24 hours)     Procedure Component Value Units Date/Time    Basic Metabolic Panel [[id number]]  (Abnormal) Collected:  02/03/20 0501    Specimen:  Blood Updated:  02/03/20 0537     Glucose 166 mg/dL      BUN 22 mg/dL      Creatinine 0.86 mg/dL      Sodium 138 mmol/L      Potassium 4.6 mmol/L      Chloride 100 mmol/L      CO2 28.0 mmol/L      Calcium 8.9 mg/dL      eGFR Non African Amer 92 mL/min/1.73      BUN/Creatinine Ratio 25.6     Anion Gap 10.0 mmol/L     Narrative:       GFR Normal >60  Chronic Kidney Disease <60  Kidney Failure <15      CBC (No Diff) [[id number]]  (Normal) Collected:  02/03/20 0501    Specimen:  Blood Updated:  02/03/20 0512     WBC 8.58 10*3/mm3      RBC 4.69 10*6/mm3      Hemoglobin 13.6 g/dL      Hematocrit 41.6 %      MCV 88.7 fL      MCH 29.0 pg      MCHC 32.7 g/dL      RDW 14.2 %      RDW-SD 45.3 fl      MPV 10.1 fL      Platelets 220 10*3/mm3         Imaging Results (All)     Procedure Component Value Units Date/Time    CT Angiogram Chest [[id number]] Collected:  01/29/20 2039     Updated:  01/29/20 2111    Narrative:       EXAM: CTA chest with contrast    CLINICAL  INDICATION: Chest pain, shortness of breath    COMPARISON: None.    TECHNIQUE: CTA of the chest was performed using contiguous axial  2 mm postcontrast sections through the chest including IV  contrast with 3-D reconstructions. This exam was performed  according to our departmental dose-optimization program, which  includes automated exposure control, adjustment of the mA and/or  kV according to patient size and/or use of iterative  reconstruction technique.    FINDINGS: There is no evidence of pulmonary embolism. There are  no findings to suggest aortic dissection. There are no enlarged  mediastinal or hilar lymph nodes. The visualized portions of the  upper abdominal structures are unremarkable. Moderate  emphysematous changes are noted in the lungs. A few mild  scattered foci of subsegmental atelectasis and scarring are  noted. There is a solid right upper lobe nodule, series 4, image  85 measuring 2.5 x 6.3 mm. The lungs are otherwise clear. There  is no pneumothorax or pleural effusion.      Impression:       1. No evidence of pulmonary embolism.  2. Emphysematous changes.  3. Incidentally noted right upper lobe micronodule for which  six-month follow-up chest CT is recommended.    Electronically signed by:  Monster Arnold MD  1/29/2020 9:10 PM CST  Workstation: [id number]    XR Chest 2 View [[id number]] Collected:  01/29/20 1823     Updated:  01/29/20 2696    Narrative:       PROCEDURE: XR CHEST 2 VW    VIEWS: PA/Lat    INDICATION: Shortness of breath    COMPARISON: CXR: 5/15/2015    FINDINGS:       - lines/tubes: none    - cardiac: size within normal limits.    - mediastinum: contour within normal limits. There is sclerotic  vascular calcification is present    - lungs: no evidence of a focal air space process, pulmonary  interstitial edema, nodule(s)/mass. At superior hyperinflated    - pleura: no evidence of  fluid.      - osseous: unremarkable for age.      Impression:       Hyperinflated lungs. No acute  "cardiopulmonary  process identified    Electronically signed by:  Esther Castañeda MD  1/29/2020 6:53 PM CST  Workstation: [id number]            Chief Complaint on Day of Discharge: none    Hospital Course:  59-year-old  male with past medical history of hypertension, COPD, chronic tobacco abuse who presented on 1/29/2020 with shortness of breath for 3 days prior to admission.  The patient was treated for acute hypoxic respiratory failure related to COPD exacerbation and type a influenza.  He was provided with oxygen support, duo nebs, Solu-Medrol, Tamiflu.  With mentioned treatment, the patient's condition improved and he was able to participate with physical and occupational therapy without evidence of desaturations on day of discharge.  Patient underwent evaluation to assess need for home oxygen, however oxygen saturations remained stable at 91% on room air.  The patient will be discharged home in stable condition with instructions for 1 week follow-up with PCP.  He is provided with tapering prescription of prednisone and prescription for remainder of Tamiflu course.  Home health physical and occupational therapy as well as respiratory assessments for COPD have been ordered at discharge.    Condition on Discharge:  stable    Physical Exam on Discharge:  /88 (BP Location: Right arm, Patient Position: Sitting)   Pulse 100   Temp 97.1 °F (36.2 °C) (Oral)   Resp 18   Ht 175.3 cm (69\")   Wt 78.8 kg (173 lb 11.2 oz)   SpO2 91%   BMI 25.65 kg/m²   Physical Exam   Constitutional: He is oriented to person, place, and time. He appears well-developed and well-nourished. No distress.   HENT:   Head: Normocephalic and atraumatic.   Right Ear: External ear normal.   Left Ear: External ear normal.   Nose: Nose normal.   Eyes: Conjunctivae are normal. Right eye exhibits no discharge. Left eye exhibits no discharge. No scleral icterus.   Neck: Normal range of motion. Neck supple. No JVD present.   Cardiovascular: " Normal rate, regular rhythm, normal heart sounds and intact distal pulses. Exam reveals no gallop and no friction rub.   No murmur heard.  Pulmonary/Chest: Effort normal and breath sounds normal. No stridor. No respiratory distress. He has no wheezes. He has no rales.   Abdominal: Soft. Bowel sounds are normal. He exhibits no distension. There is no tenderness.   Musculoskeletal: Normal range of motion. He exhibits no edema.   Neurological: He is alert and oriented to person, place, and time.   Skin: Skin is warm and dry. He is not diaphoretic.   Psychiatric: He has a normal mood and affect. His behavior is normal.   Nursing note and vitals reviewed.        Discharge Disposition:  Home-Health Care [facility]    Discharge Medications:     Discharge Medications      New Medications      Instructions Start Date   guaiFENesin 600 MG 12 hr tablet  Commonly known as:  MUCINEX   600 mg, Oral, Every 12 Hours Scheduled      oseltamivir 75 MG capsule  Commonly known as:  TAMIFLU   75 mg, Oral, Every 12 Hours Scheduled      predniSONE 10 MG tablet  Commonly known as:  DELTASONE   40 mg oral daily x 2 days, then 30 mg oral daily x 2 days, then 20 mg oral daily x 2 days, then 10 mg oral daily x 2 days.         Continue These Medications      Instructions Start Date   acetaminophen 500 MG tablet  Commonly known as:  TYLENOL   500 mg, Oral, Every 6 Hours PRN      albuterol sulfate  (90 Base) MCG/ACT inhaler  Commonly known as:  PROVENTIL HFA;VENTOLIN HFA;PROAIR HFA   2 puffs, Inhalation, Every 4 Hours PRN      hydroCHLOROthiazide 12.5 MG capsule  Commonly known as:  MICROZIDE   12.5 mg, Oral, Daily             Discharge Diet:   Diet Instructions     Diet: Cardiac; Thin      Discharge Diet:  Cardiac    Fluid Consistency:  Thin          Activity at Discharge:   Activity Instructions     Activity as Tolerated            Discharge Care Plan/Instructions: Follow-up with PCP within 1 week of discharge.    Follow-up Appointments:      Additional Instructions for the Follow-ups that You Need to Schedule     Ambulatory Referral to Home Health   As directed      Face to Face Visit Date:  2/3/2020    Follow-up provider for Plan of Care?:  I treated the patient in an acute care facility and will not continue treatment after discharge.    Follow-up provider:  ARABELLA LUU [[id number]]    Reason/Clinical Findings:  deconditioning r/t flu and copd    Describe mobility limitations that make leaving home difficult:  deconditioning r/t flu and copd    Nursing/Therapeutic Services Requested:  Skilled Nursing Physical Therapy Occupational Therapy    Skilled nursing orders:  COPD management Cardiopulmonary assessments    PT orders:  Strengthening    Occupational orders:  Strengthening    Frequency:  1 Week 1         Discharge Follow-up with PCP   As directed       Currently Documented PCP:    Arabella Luu APRN    PCP Phone Number:    [phone]     Follow Up Details:  one week            Contact information for follow-up providers     Arabella Luu APRN Follow up.    Specialty:  Nurse Practitioner  Contact information:  [address] [facility] [address]  [phone]                   Contact information for after-discharge care     Home Medical Care     [facility] .    Service:  Home Health Services  Contact information:  [address]   [place] [address] [phone]                             Test Results Pending at Discharge:           This document has been electronically signed by CRISTOBAL Ross on February 3, 2020 2:29 PM        Time: Greater than 30 minutes was spent on the assessment, discussion, and discharge planning for this patient.

## 2020-02-03 NOTE — PLAN OF CARE
Problem: Patient Care Overview  Goal: Plan of Care Review  2/3/2020 1310 by Stanley Ruiz PTA  Outcome: Ongoing (interventions implemented as appropriate)  Flowsheets (Taken 2/3/2020 1310)  Progress: improving  Plan of Care Reviewed With: patient  Outcome Summary: pt responded well to PT. pt w/ increased gait to 240 ft in room SBA w/ RW and minor unsteadiness that is self corrected. pts O2 was taken off at beginning of tx and SpO2 never dropped below 91% and improved to 96 % at end of tx. no new goals met at this time. pt would continue to benefit from PT services.

## 2020-02-03 NOTE — THERAPY TREATMENT NOTE
Acute Care - Physical Therapy Treatment Note  [facility]     Patient Name: Jeff Werner  : 1960  MRN: [id number]  Today's Date: 2/3/2020  Onset of Illness/Injury or Date of Surgery: 20     Referring Physician: Madelyn JANI    Admit Date: 2020    Visit Dx:    ICD-10-CM ICD-9-CM   1. COPD exacerbation (CMS/HCC) J44.1 491.21   2. Pulmonary nodule R91.1 793.11   3. Impaired physical mobility Z74.09 781.99   4. Impaired mobility and ADLs Z74.09 799.89   5. Type A influenza J10.1 487.1   6. Acute respiratory failure with hypoxia (CMS/HCC) J96.01 518.81     Patient Active Problem List   Diagnosis   • Right knee pain   • Degenerative disc disease, lumbar   • Internal derangement of right knee   • Chronic midline low back pain without sciatica   • Mechanical pain of right knee   • Numbness and tingling of left leg   • COPD exacerbation (CMS/HCC)   • Acute respiratory failure with hypoxia (CMS/[facility])   • Hypertension   • Elevated cholesterol   • Tobacco abuse   • Hypokalemia   • Type A influenza       Therapy Treatment    Rehabilitation Treatment Summary     Row Name 20 1029             Treatment Time/Intention    Discipline  physical therapy assistant  -DOUG      Document Type  therapy note (daily note)  -DOUG      Mode of Treatment  individual therapy;physical therapy  -JC2      Therapy Frequency (PT Clinical Impression)  other (see comments) 6 x week  -JC2      Existing Precautions/Restrictions  other (see comments) droplet precautions  -JC2      Recorded by [DOUG] Stanley Ruiz, PTA 20 1309  [JC2] Stanley Ruiz, PTA 20 1048      Row Name 20 1029             Vital Signs    Pre Systolic BP Rehab  130  -DOUG      Pre Treatment Diastolic BP  78  -DOUG      Post Systolic BP Rehab  132  -JC2      Post Treatment Diastolic BP  50  -JC2      Pretreatment Heart Rate (beats/min)  99  -DOUG      Intratreatment Heart Rate (beats/min)  105  -JC2      Posttreatment Heart Rate (beats/min)   96  -JC2      Pre SpO2 (%)  96  -DOUG      O2 Delivery Pre Treatment  nasal cannula  -DOUG      Intra SpO2 (%)  91  -JC2      O2 Delivery Intra Treatment  room air  -JC2      Post SpO2 (%)  96  -JC2      O2 Delivery Post Treatment  room air  -JC2      Pre Patient Position  Sitting  -DOUG      Post Patient Position  Sitting  -DOUG      Recorded by [DOUG] Stanley Ruiz, PTA 02/03/20 1048  [JC2] Stanley Ruiz, PTA 02/03/20 1053      Row Name 02/03/20 1029             Cognitive Assessment/Intervention- PT/OT    Affect/Mental Status (Cognitive)  WFL  -DOUG      Orientation Status (Cognition)  oriented x 4  -DOUG      Personal Safety Interventions  fall prevention program maintained;gait belt;muscle strengthening facilitated;nonskid shoes/slippers when out of bed;supervised activity  -DOUG      Recorded by [DOUG] Stanley Ruiz, PTA 02/03/20 1048      Row Name 02/03/20 1029             Safety Issues, Functional Mobility    Impairments Affecting Function (Mobility)  endurance/activity tolerance  -DOUG      Recorded by [DOUG] Stanley Ruiz, PTA 02/03/20 1048      Row Name 02/03/20 1029             Bed Mobility Assessment/Treatment    Bed Mobility Assessment/Treatment  --  -DOUG      Supine-Sit [place] (Bed Mobility)  --  -DOUG      Sit-Supine [place] (Bed Mobility)  --  -DOUG      Assistive Device (Bed Mobility)  --  -DOUG      Recorded by [DOUG] Stanley Ruiz, PTA 02/03/20 1309      Row Name 02/03/20 1029             Transfer Assessment/Treatment    Transfer Assessment/Treatment  sit-stand transfer;stand-sit transfer  -DOUG      Recorded by [DOUG] Stanley Ruiz, PTA 02/03/20 1309      Row Name 02/03/20 1029             Sit-Stand Transfer    Sit-Stand [place] (Transfers)  supervision  -DOUG      Assistive Device (Sit-Stand Transfers)  -- none  -JC2      Recorded by [DOUG] Stanley Ruiz, PTA 02/03/20 1309  [JC2] Stanley Ruiz, PTA 02/03/20 1048      Row Name 02/03/20 1029             Stand-Sit Transfer    Stand-Sit  [place] (Transfers)  supervision  -DOUG      Assistive Device (Stand-Sit Transfers)  walker, front-wheeled  -DOUG      Recorded by [DOUG] Stanley Ruiz, PTA 02/03/20 1309      Row Name 02/03/20 1029             Gait/Stairs Assessment/Training    Gait/Stairs Assessment/Training  gait/ambulation independence;gait/ambulation assistive device;distance ambulated  -DOUG      [place] Level (Gait)  supervision  -JC2      Assistive Device (Gait)  -- none  -DOUG      Distance in Feet (Gait)  240 in room  -JC2      Pattern (Gait)  step-through  -JC2      Deviations/Abnormal Patterns (Gait)  other (see comments) minor unsteadiness. always self corrected.   -JC2      Recorded by [DOUG] Stanley Ruiz, PTA 02/03/20 1048  [JC2] Stanley Ruiz, PTA 02/03/20 1309      Row Name 02/03/20 1029             Positioning and Restraints    Pre-Treatment Position  sitting in chair/recliner  -DOUG      Post Treatment Position  chair  -DOUG      In Chair  reclined;call light within reach;encouraged to call for assist;exit alarm on all needs met  -DOUG      Recorded by [DOUG] Stanlye Ruiz, PTA 02/03/20 1048      Row Name 02/03/20 1029             Pain Scale: Numbers Pre/Post-Treatment    Pain Scale: Numbers, Pretreatment  6/10  -DOUG      Pain Scale: Numbers, Post-Treatment  6/10  -JC2      Pain Location  head and thighs  -DOUG      Pain Intervention(s)  Ambulation/increased activity;Repositioned  -JC2      Recorded by [DOUG] Stanley Ruiz, PTA 02/03/20 1048  [JC2] Stanley Ruiz, PTA 02/03/20 1309      Row Name 02/03/20 1029             Sensory Assessment/Intervention    Sensory General Assessment  --  -DOUG      Recorded by [DOUG] Stanley Ruiz, PTA 02/03/20 1309      Row Name 02/03/20 1029             Outcome Summary/Treatment Plan (PT)    Daily Summary of Progress (PT)  progress toward functional goals as expected  -DOUG      Plan for Continued Treatment (PT)  continue  -DOUG      Anticipated Equipment Needs at Discharge (PT)  -- none  -JC2       Anticipated Discharge Disposition (PT)  home with assist  -JC2      Recorded by [DOUG] Stanley Ruiz PTA 02/03/20 1309  [JC2] Stanley Ruiz, PTA 02/03/20 1048        User Key  (r) = Recorded By, (t) = Taken By, (c) = Cosigned By    Initials Name Effective Dates Discipline    Stanley Wilson PTA 03/07/18 -  PT               Rehab Goal Summary     Row Name 02/03/20 1029             Bed Mobility Goal 1 (PT)    Activity/Assistive Device (Bed Mobility Goal 1, PT)  sit to supine;supine to sit HOB down and no rails  -DOUG      [place] Level/Cues Needed (Bed Mobility Goal 1, PT)  independent  -DOUG      Time Frame (Bed Mobility Goal 1, PT)  3 days  -DOUG      Progress/Outcomes (Bed Mobility Goal 1, PT)  goal not met  -DOUG         Transfer Goal 1 (PT)    Activity/Assistive Device (Transfer Goal 1, PT)  sit-to-stand/stand-to-sit;bed-to-chair/chair-to-bed;other (see comments) no device  -DOUG      [place] Level/Cues Needed (Transfer Goal 1, PT)  independent  -DOUG      Time Frame (Transfer Goal 1, PT)  2 - 3 days  -DOUG      Progress/Outcome (Transfer Goal 1, PT)  goal not met  -DOUG         Gait Training Goal 1 (PT)    Activity/Assistive Device (Gait Training Goal 1, PT)  gait (walking locomotion);assistive device use;decrease fall risk;increase endurance/gait distance;turning, left;turning, right  -DOUG      [place] Level (Gait Training Goal 1, PT)  independent  -DOUG      Distance (Gait Goal 1, PT)  100 feet  -DOUG      Time Frame (Gait Training Goal 1, PT)  3 days  -DOUG      Progress/Outcome (Gait Training Goal 1, PT)  goal not met  -DOUG        User Key  (r) = Recorded By, (t) = Taken By, (c) = Cosigned By    Initials Name Provider Type Discipline    Stanley Wilson PTA Physical Therapy Assistant PT              PT Recommendation and Plan  Anticipated Discharge Disposition (PT): home with assist  Therapy Frequency (PT Clinical Impression): other (see comments)(6 x week)  Outcome Summary/Treatment Plan (PT)  Daily  Summary of Progress (PT): progress toward functional goals as expected  Plan for Continued Treatment (PT): continue  Anticipated Equipment Needs at Discharge (PT): (none)  Anticipated Discharge Disposition (PT): home with assist  Plan of Care Reviewed With: patient  Progress: improving  Outcome Summary: pt responded well to PT. pt w/ increased gait to 240 ft in room SBA w/ RW and minor unsteadiness that is self corrected. pts O2 was taken off at beginning of tx and SpO2 never dropped below 91% and improved to 96 % at end of tx. no new goals met at this time. pt would continue to benefit from PT services.  Outcome Measures     Row Name 02/03/20 1029 02/02/20 1313          How much help from another person do you currently need...    Turning from your back to your side while in flat bed without using bedrails?  4  -DOUG  --     Moving from lying on back to sitting on the side of a flat bed without bedrails?  4  -DOUG  --     Moving to and from a bed to a chair (including a wheelchair)?  3  -DOUG  --     Standing up from a chair using your arms (e.g., wheelchair, bedside chair)?  3  -DOUG  --     Climbing 3-5 steps with a railing?  3  -DOUG  --     To walk in hospital room?  3  -DOUG  --     AM-PAC 6 Clicks Score (PT)  20  -DOUG  --        How much help from another is currently needed...    Putting on and taking off regular lower body clothing?  --  3  -BH     Bathing (including washing, rinsing, and drying)  --  3  -BH     Toileting (which includes using toilet bed pan or urinal)  --  3  -BH     Putting on and taking off regular upper body clothing  --  3  -BH     Taking care of personal grooming (such as brushing teeth)  --  4  -BH     Eating meals  --  4  -BH     AM-PAC 6 Clicks Score (OT)  --  20  -BH        Functional Assessment    Outcome Measure Options  AM-PAC 6 Clicks Basic Mobility (PT)  -DOUG  AM-PAC 6 Clicks Daily Activity (OT)  -       User Key  (r) = Recorded By, (t) = Taken By, (c) = Cosigned By    Initials Name  Provider Type     Lashaun Sol, OTR/L Occupational Therapist    Stanley Wilson PTA Physical Therapy Assistant         Time Calculation:   PT Charges     Row Name 02/03/20 1312             Time Calculation    Start Time  1029  -DOUG      Stop Time  1100  -DOUG      Time Calculation (min)  31 min  -ODUG         Time Calculation- PT    Total Timed Code Minutes- PT  31 minute(s)  -DOUG        User Key  (r) = Recorded By, (t) = Taken By, (c) = Cosigned By    Initials Name Provider Type    Stanley Wilson PTA Physical Therapy Assistant        Therapy Charges for Today     Code Description Service Date Service Provider Modifiers Qty    27305269521 HC GAIT TRAINING EA 15 MIN 2/3/2020 Stanley Ruiz PTA GP 1    62544266461 HC PT THERAPEUTIC ACT EA 15 MIN 2/3/2020 Stanley Ruiz PTA GP 1          PT G-Codes  Outcome Measure Options: AM-PAC 6 Clicks Basic Mobility (PT)  AM-PAC 6 Clicks Score (PT): 20  AM-PAC 6 Clicks Score (OT): 20    Stanley Ruiz PTA  2/3/2020

## 2020-02-04 ENCOUNTER — READMISSION MANAGEMENT (OUTPATIENT)
Dept: CALL CENTER | Facility: HOSPITAL | Age: 60
End: 2020-02-04

## 2020-02-04 ENCOUNTER — APPOINTMENT (OUTPATIENT)
Dept: PHYSICAL THERAPY | Facility: HOSPITAL | Age: 60
End: 2020-02-04

## 2020-02-04 NOTE — OUTREACH NOTE
Prep Survey      Responses   Facility patient discharged from?  [place]   Is patient eligible?  Yes   Discharge diagnosis  Type A influenza   Does the patient have one of the following disease processes/diagnoses(primary or secondary)?  COPD/Pneumonia   Does the patient have Home health ordered?  Yes   Is there a DME ordered?  No   Prep survey completed?  Yes          Beverley Clifford RN

## 2020-02-05 ENCOUNTER — READMISSION MANAGEMENT (OUTPATIENT)
Dept: CALL CENTER | Facility: HOSPITAL | Age: 60
End: 2020-02-05

## 2020-02-05 ENCOUNTER — APPOINTMENT (OUTPATIENT)
Dept: PHYSICAL THERAPY | Facility: HOSPITAL | Age: 60
End: 2020-02-05

## 2020-02-05 NOTE — OUTREACH NOTE
COPD/PN Week 1 Survey      Responses   Facility patient discharged from?  [place]   Does the patient have one of the following disease processes/diagnoses(primary or secondary)?  COPD/Pneumonia   Is there a successful TCM telephone encounter documented?  No   Was the primary reason for admission:  COPD exacerbation   Week 1 attempt successful?  Yes   Call start time  0937   Call end time  0945   Is patient permission given to speak with other caregiver?  Yes   List who call center can speak with  does not mattar   Medication alerts for this patient  has gotten all the medication   Meds reviewed with patient/caregiver?  Yes   Is the patient having any side effects they believe may be caused by any medication additions or changes?  Yes   Does the patient have all medications ordered at discharge?  Yes   Is the patient taking all medications as directed (includes completed medication regime)?  Yes   Comments regarding appointments  appointments  2/10   Does the patient have a primary care provider?   Yes   Does the patient have an appointment with their PCP or pulmonologist within 7 days of discharge?  Yes   What is the Home health agency?   [facility]   Has home health visited the patient within 72 hours of discharge?  Yes   Did the patient receive a copy of their discharge instructions?  Yes   Nursing interventions  Reviewed instructions with patient   What is the patient's perception of their health status since discharge?  Improving [legs are getting better]   Are the patient's immunizations up to date?   No [did not get a flu or pnuemonia]   If the patient is a current smoker, are they able to teach back resources for cessation?  -- [in process of trying to quit]   Is the patient able to teach back COPD zones?  No   Nursing interventions  Education provided on various zones [reviewed the zones in detail]   Patient reports what zone on this call?  Yellow Zone   Yellow Zone  Increased swelling of ankles   Yellow  interventions  Continue to use daily medications, Call provider immediatly if symptoms do not improve   Week 1 call completed?  Yes   Wrap up additional comments   feels he is between the green and the yellow zones          Alicia Sebastian RN

## 2020-02-13 ENCOUNTER — READMISSION MANAGEMENT (OUTPATIENT)
Dept: CALL CENTER | Facility: HOSPITAL | Age: 60
End: 2020-02-13

## 2020-02-13 NOTE — OUTREACH NOTE
COPD/PN Week 2 Survey      Responses   Facility patient discharged from?  [place]   Does the patient have one of the following disease processes/diagnoses(primary or secondary)?  COPD/Pneumonia   Was the primary reason for admission:  COPD exacerbation   Week 2 attempt successful?  Yes   Call start time  1057   Call end time  1102   Discharge diagnosis  Type A influenza   Meds reviewed with patient/caregiver?  Yes   Is the patient taking all medications as directed (includes completed medication regime)?  Yes   Has the patient kept scheduled appointments due by today?  Yes   Home health comments   has released him   Psychosocial issues?  No   What is the patient's perception of their health status since discharge?  Improving   If the patient is a current smoker, are they able to teach back resources for cessation?  Smoking cessation medications   Is the patient/caregiver able to teach back the hierarchy of who to call/visit for symptoms/problems? PCP, Specialist, Home health nurse, Urgent Care, ED, 911  Yes   Additional teach back comments  Still smoking 1/2 PPD but trying to quit. Going back to work today and hoping he makes it through the whole day. Enc him to rest and stay hydrated.    Is the patient able to teach back COPD zones?  Yes   Nursing interventions  Education provided on various zones   Patient reports what zone on this call?  Yellow Zone   Yellow Zone  Increased shortness of air   Yellow interventions  Continue to use daily medications, Do not smoke, Get plenty of rest, Call provider immediatly if symptoms do not improve [Still has some SOA but it is getting better]   Week 2 call completed?  Yes          Lisa Caba RN

## 2020-02-17 ENCOUNTER — TRANSCRIBE ORDERS (OUTPATIENT)
Dept: PHYSICAL THERAPY | Facility: HOSPITAL | Age: 60
End: 2020-02-17

## 2020-02-17 DIAGNOSIS — M25.561 RIGHT KNEE PAIN, UNSPECIFIED CHRONICITY: Primary | ICD-10-CM

## 2020-02-21 ENCOUNTER — READMISSION MANAGEMENT (OUTPATIENT)
Dept: CALL CENTER | Facility: HOSPITAL | Age: 60
End: 2020-02-21

## 2020-02-21 NOTE — OUTREACH NOTE
COPD/PN Week 3 Survey      Responses   Facility patient discharged from?  [place]   Does the patient have one of the following disease processes/diagnoses(primary or secondary)?  COPD/Pneumonia   Was the primary reason for admission:  COPD exacerbation   Week 3 attempt successful?  Yes   Call start time  1104   Call end time  1107   Discharge diagnosis  Type A influenza   Meds reviewed with patient/caregiver?  Yes   Is the patient having any side effects they believe may be caused by any medication additions or changes?  No   Does the patient have all medications ordered at discharge?  Yes   Does the patient have a primary care provider?   Yes   Does the patient have an appointment with their PCP or pulmonologist within 7 days of discharge?  Yes   Has the patient kept scheduled appointments due by today?  Yes   What is the Home health agency?   [facility]   Home health comments   has released him   Psychosocial issues?  No   Did the patient receive a copy of their discharge instructions?  Yes   Nursing interventions  Reviewed instructions with patient   What is the patient's perception of their health status since discharge?  Improving   Nursing Interventions  Nurse provided patient education   Are the patient's immunizations up to date?   No   If the patient is a current smoker, are they able to teach back resources for cessation?  Smoking cessation medications   Is the patient/caregiver able to teach back the hierarchy of who to call/visit for symptoms/problems? PCP, Specialist, Home health nurse, Urgent Care, ED, 911  Yes   Is the patient able to teach back COPD zones?  Yes   Nursing interventions  Education provided on various zones   Patient reports what zone on this call?  Yellow Zone   Yellow Zone  Increased shortness of air   Yellow interventions  Continue to use daily medications, Do not smoke, Get plenty of rest, Call provider immediatly if symptoms do not improve   Week 3 call completed?  Yes   Wrap up  additional comments  Patient is definitely doing better.           Saw Ortiz RN

## 2020-02-25 ENCOUNTER — APPOINTMENT (OUTPATIENT)
Dept: PHYSICAL THERAPY | Facility: HOSPITAL | Age: 60
End: 2020-02-25

## 2020-02-28 ENCOUNTER — READMISSION MANAGEMENT (OUTPATIENT)
Dept: CALL CENTER | Facility: HOSPITAL | Age: 60
End: 2020-02-28

## 2020-02-28 NOTE — OUTREACH NOTE
COPD/PN Week 4 Survey      Responses   Facility patient discharged from?  [place]   Does the patient have one of the following disease processes/diagnoses(primary or secondary)?  COPD/Pneumonia   Was the primary reason for admission:  COPD exacerbation   Week 4 attempt successful?  Yes   Call start time  1513   Call end time  1520   Discharge diagnosis  Type A influenza   Medication alerts for this patient  has gotten all the medication   Meds reviewed with patient/caregiver?  Yes   Is the patient having any side effects they believe may be caused by any medication additions or changes?  No   Is the patient taking all medications as directed (includes completed medication regime)?  Yes   Medication comments  finished predisone   Has the patient kept scheduled appointments due by today?  Yes   Is the patient still receiving Home Health Services?  No   Psychosocial issues?  No   What is the patient's perception of their health status since discharge?  Returned to baseline/stable   Nursing Interventions  Nurse provided patient education   Are the patient's immunizations up to date?   No   Nursing interventions  Educated on importance of maintaining up to date immunizations as advised by provider, Advised patient to discuss with provider at next visit   Is the patient/caregiver able to teach back the hierarchy of who to call/visit for symptoms/problems? PCP, Specialist, Home health nurse, Urgent Care, ED, 911  Yes   Additional teach back comments  states still smoking approximatley 1/2 PPD. States he is hoping to quit by the end of next month   Is the patient able to teach back COPD zones?  Yes   Nursing interventions  Education provided on various zones   Patient reports what zone on this call?  Green Zone   Green Zone  Reports doing well, Breathing without shortness of breath, Usual activity and exercise level, Usual amount of phlegm/mucus without difficulty coughing up, Appetite is good, Sleeping well   Green  Zone interventions:  Take daily medications, Do not smoke, Avoid indoor/outdoor triggers   Week 4 call completed?  Yes   Would the patient like one additional call?  No   Graduated  Yes   Did the patient feel the follow up calls were helpful during their recovery period?  Yes          Shani Cavanaugh RN

## 2020-03-03 ENCOUNTER — HOSPITAL ENCOUNTER (OUTPATIENT)
Dept: PHYSICAL THERAPY | Facility: HOSPITAL | Age: 60
Setting detail: THERAPIES SERIES
Discharge: HOME | End: 2020-03-03

## 2020-03-03 DIAGNOSIS — M23.91 INTERNAL DERANGEMENT OF RIGHT KNEE: Primary | ICD-10-CM

## 2020-03-03 DIAGNOSIS — G89.29 CHRONIC PAIN OF RIGHT KNEE: ICD-10-CM

## 2020-03-03 DIAGNOSIS — M25.561 CHRONIC PAIN OF RIGHT KNEE: ICD-10-CM

## 2020-03-03 PROCEDURE — 97161 PT EVAL LOW COMPLEX 20 MIN: CPT | Performed by: PHYSICAL THERAPIST

## 2020-03-03 NOTE — THERAPY EVALUATION
Outpatient Physical Therapy Ortho Initial Evaluation  [facility]     Patient Name: Jeff Werner  : 1960  MRN: [id number]  Today's Date: 3/3/2020      Visit Date: 2020  Visit  (used 6 prior)  Return to MD: [facility]  Re-cert date: 3/24/20    Patient Active Problem List   Diagnosis   • Right knee pain   • Degenerative disc disease, lumbar   • Internal derangement of right knee   • Chronic midline low back pain without sciatica   • Mechanical pain of right knee   • Numbness and tingling of left leg   • COPD exacerbation (CMS/HCC)   • Acute respiratory failure with hypoxia (CMS/HCC)   • Hypertension   • Elevated cholesterol   • Tobacco abuse   • Hypokalemia   • Type A influenza        Past Medical History:   Diagnosis Date   • Arthritis of back    • Asthma    • COPD (chronic obstructive pulmonary disease) (CMS/HCC)    • Degenerative disc disease, lumbar    • Elevated cholesterol    • Hypertension    • Lumbosacral disc disease     history of disc hernations in the lumbar spine        Past Surgical History:   Procedure Laterality Date   • COLONOSCOPY N/A 2018    Procedure: COLONOSCOPY;  Surgeon: Jovanny Landis DO;  Location: [facility] ENDOSCOPY;  Service: Gastroenterology       Visit Dx:     ICD-10-CM ICD-9-CM   1. Internal derangement of right knee M23.91 717.9   2. Chronic pain of right knee M25.561 719.46    G89.29 338.29     Medications (Admitted on 3/3/2020)     acetaminophen (TYLENOL) 500 MG tablet     albuterol (PROVENTIL HFA;VENTOLIN HFA) 108 (90 Base) MCG/ACT inhaler     guaiFENesin (MUCINEX) 600 MG 12 hr tablet     hydrochlorothiazide (MICROZIDE) 12.5 MG capsule     predniSONE (DELTASONE) 10 MG tablet      Allergies       CarvedilolAnaphylaxis   LosartanAnaphylaxis   LisinoprilAngioedema             PT Ortho     Row Name 20 1100       Subjective Comments    Subjective Comments  Pt was hospitalized for 5 days for the flu and COPD exacerbation. Had to stop PT after only 6  sessions, last one on 1/21/20. X-ray evidence of no soft tissue injury nor degenerative changes. Prior PT was helpful. Had to squat down alongside his truck 1 week ago and aggravated his R knee pain. Starting clicking and catching at the time. Hospitalized from 1/29/20-2/3/20. Off work for another week until resumed back at [employer] [place] a .   -BS       Precautions and Contraindications    Precautions/Limitations  no known precautions/limitations  -BS       Subjective Pain    Able to rate subjective pain?  yes  -BS    Pre-Treatment Pain Level  1  -BS    Post-Treatment Pain Level  1  -BS       Posture/Observations    Posture/Observations Comments  slight eccentric weakness with R quads with SLR  -BS       Knee Special Tests    Anterior drawer (ACL lesion)  Right:;Negative  -BS    Posterior drawer (PCL lesion)  Right:;Negative  -BS    Valgus stress (MCL lesion)  Right:;Negative  -BS    Varus stress (LCL lesion)  Right:;Negative  -BS    Apley’s compression test (meniscal lesion vs OA)  Right:;Positive  -BS    Thessaly test (meniscal lesion)  Right:;Positive positive R med knee jt line pain-0 deg ext +slight knee flex  -BS    Jose’s test (meniscal lesion)  Right:;Negative  -BS    Patellar grind test (chondromalacia patella)  Right:;Negative  -BS       General ROM    GENERAL ROM COMMENTS  AROM: R knee 0-142 deg L knee 0-146 deg  -BS       MMT (Manual Muscle Testing)    General MMT Comments  MMT: 5/5 R LE (all planes) L LE 5/5  -BS       Sensation    Light Touch  Partial deficits in the LLE L ant/lat thigh numb-mid to distal thigh region  -BS       Lower Extremity Flexibility    Hamstrings  Bilateral:;Moderately limited R 58 deg, L 60 deg w/ 90/90 SLR test  -BS    Hip Flexors  Bilateral:;WNL  -BS    Quadriceps  Bilateral:;Mildly limited  -BS    ITB  Bilateral:;Mildly limited  -BS      User Key  (r) = Recorded By, (t) = Taken By, (c) = Cosigned By    Initials Name Provider Type    Aj Moerno  PT Physical Therapist                            PT OP Goals     Row Name 03/03/20 1100          PT Short Term Goals    STG Date to Achieve  12/31/19  -BS     STG 1  Able to perform 10 right SLR's with good eccentric control of R quads  -BS     STG 1 Progress  Ongoing;Progressing  -BS     STG 2  Improve R hip extension MMT to 5/5  -BS     STG 2 Progress  Met  -BS     STG 3  Able to stand for prolonged periods at work and descend stairs with no R knee pain  -BS     STG 3 Progress  Ongoing  -BS     STG 4  Improve right hamstrings flexibility to >/=70 deg with 90/90 SLR test  -BS     STG 4 Progress  Ongoing;Progressing  -BS     STG 5  Able to kneel at work for extended periods with minimal to no R knee pain  -BS     STG 5 Progress  Not Met  -BS        Time Calculation    PT Goal Re-Cert Due Date  03/24/20  -BS       User Key  (r) = Recorded By, (t) = Taken By, (c) = Cosigned By    Initials Name Provider Type    BS Aj Mayes, PT Physical Therapist          PT Assessment/Plan     Row Name 03/03/20 1100          PT Assessment    Functional Limitations  Impaired gait;Performance in work activities  -BS     Impairments  Impaired flexibility;Pain;Motor function  -BS     Assessment Comments  Evaluated after recent hospitalization last month. Presents still with signs and symptoms consistent with meniscal involvement. Positive special test findings with Apley Compression test and Thessaly test on the R knee. No ROM deficits nor strength deficits found, mostly R knee instability at this point. Limited eccentric control with 10 SLR's on the R.   -BS     Please refer to paper survey for additional self-reported information  Yes  -BS     Rehab Potential  Good  -BS     Patient/caregiver participated in establishment of treatment plan and goals  Yes  -BS     Patient would benefit from skilled therapy intervention  Yes  -BS        PT Plan    PT Frequency  2x/week  -BS     Predicted Duration of Therapy Intervention (Therapy Eval)  " 3 weeks  -BS     Planned CPT's?  PT EVAL LOW COMPLEXITY: 33287;PT THER PROC EA 15 MIN: 55937;PT RE-EVAL: 70020;PT THER ACT EA 15 MIN: 17646;PT MANUAL THERAPY EA 15 MIN: 05214;PT NEUROMUSC RE-EDUCATION EA 15 MIN: 61847;PT ELECTRICAL STIM UNATTEND: ;PT ULTRASOUND EA 15 MIN: 77982;PT HOT/COLD PACK WC NONMCARE: 07208;PT THER SUPP EA 15 MIN  -BS     Physical Therapy Interventions (Optional Details)  balance training;home exercise program;joint mobilization;manual therapy techniques;modalities;neuromuscular re-education;patient/family education;strengthening;stretching;stair training;ROM (Range of Motion)  -BS     PT Plan Comments  address quad control, balance. Hamstrings and ITB stretching.   -BS       User Key  (r) = Recorded By, (t) = Taken By, (c) = Cosigned By    Initials Name Provider Type    BS Aj Mayes, PT Physical Therapist            OP Exercises     Row Name 03/03/20 1100             Subjective Comments    Subjective Comments  Pt was hospitalized for 5 days for the flu and COPD exacerbation. Had to stop PT after only 6 sessions, last one on 1/21/20. X-ray evidence of no soft tissue injury nor degenerative changes. Prior PT was helpful. Had to squat down alongside his truck 1 week ago and aggravated his R knee pain. Starting clicking and catching at the time. Hospitalized from 1/29/20-2/3/20. Off work for another week until resumed back at [employer] as a .   -BS         Subjective Pain    Able to rate subjective pain?  yes  -BS      Pre-Treatment Pain Level  1  -BS      Post-Treatment Pain Level  1  -BS         Exercise 1    Exercise Name 1  standing HS S, R only  -BS      Sets 1  1  -BS      Reps 1  1  -BS      Time 1  30\" hold  -BS         Exercise 2    Exercise Name 2  gastroc S at wall  -BS      Sets 2  1  -BS      Reps 2  1  -BS      Time 2  30\" hold  -BS      Additional Comments  R only  -BS        User Key  (r) = Recorded By, (t) = Taken By, (c) = Cosigned By    Initials " Name Provider Type    BS Aj Mayes, PT Physical Therapist                        Outcome Measure Options: Lower Extremity Functional Scale (LEFS)  Lower Extremity Functional Index  Any of your usual work, housework or school activities: A little bit of difficulty  Your usual hobbies, recreational or sporting activities: A little bit of difficulty  Getting into or out of the bath: No difficulty  Walking between rooms: No difficulty  Putting on your shoes or socks: Moderate difficulty  Squatting: Quite a bit of difficulty  Lifting an object, like a bag of groceries from the floor: A little bit of difficulty  Performing light activities around your home: A little bit of difficulty  Performing heavy activities around your home: Moderate difficulty  Getting into or out of a car: No difficulty  Walking 2 blocks: Quite a bit of difficulty  Walking a mile: Extreme difficulty or unable to perform activity  Going up or down 10 stairs (about 1 flight of stairs): A little bit of difficulty  Standing for 1 hour: A little bit of difficulty  Sitting for 1 hour: A little bit of difficulty  Running on even ground: Quite a bit of difficulty  Running on uneven ground: Extreme difficulty or unable to perform activity  Making sharp turns while running fast: Extreme difficulty or unable to perform activity  Hopping: Quite a bit of difficulty  Rolling over in bed: No difficulty  Total: 45      Time Calculation:     Start Time: 1058  Stop Time: 1123  Time Calculation (min): 25 min  Total Timed Code Minutes- PT: 25 minute(s)     Therapy Charges for Today     Code Description Service Date Service Provider Modifiers Qty    83237835181 HC PT EVAL LOW COMPLEXITY 2 3/3/2020 Aj Mayes, PT GP 1          PT G-Codes  Outcome Measure Options: Lower Extremity Functional Scale (LEFS)  Total: 45         Aj Mayes PT  3/3/2020

## 2020-03-10 ENCOUNTER — HOSPITAL ENCOUNTER (OUTPATIENT)
Dept: PHYSICAL THERAPY | Facility: HOSPITAL | Age: 60
Setting detail: THERAPIES SERIES
Discharge: HOME | End: 2020-03-10

## 2020-03-10 DIAGNOSIS — M23.91 INTERNAL DERANGEMENT OF RIGHT KNEE: Primary | ICD-10-CM

## 2020-03-10 DIAGNOSIS — M25.561 CHRONIC PAIN OF RIGHT KNEE: ICD-10-CM

## 2020-03-10 DIAGNOSIS — G89.29 CHRONIC PAIN OF RIGHT KNEE: ICD-10-CM

## 2020-03-10 PROCEDURE — 97110 THERAPEUTIC EXERCISES: CPT | Performed by: PHYSICAL THERAPIST

## 2020-03-10 NOTE — THERAPY TREATMENT NOTE
Outpatient Physical Therapy Ortho Treatment Note  [facility]     Patient Name: Jeff Werner  : 1960  MRN: [id number]  Today's Date: 3/10/2020      Visit Date: 03/10/2020  Visit 2/2 (used 6 prior)  Return to MD: [facility]  Re-cert date: 3/24/20  Visit Dx:    ICD-10-CM ICD-9-CM   1. Internal derangement of right knee M23.91 717.9   2. Chronic pain of right knee M25.561 719.46    G89.29 338.29       Patient Active Problem List   Diagnosis   • Right knee pain   • Degenerative disc disease, lumbar   • Internal derangement of right knee   • Chronic midline low back pain without sciatica   • Mechanical pain of right knee   • Numbness and tingling of left leg   • COPD exacerbation (CMS/HCC)   • Acute respiratory failure with hypoxia (CMS/HCC)   • Hypertension   • Elevated cholesterol   • Tobacco abuse   • Hypokalemia   • Type A influenza        Past Medical History:   Diagnosis Date   • Arthritis of back    • Asthma    • COPD (chronic obstructive pulmonary disease) (CMS/HCC)    • Degenerative disc disease, lumbar    • Elevated cholesterol    • Hypertension    • Lumbosacral disc disease     history of disc hernations in the lumbar spine        Past Surgical History:   Procedure Laterality Date   • COLONOSCOPY N/A 2018    Procedure: COLONOSCOPY;  Surgeon: Jovanny Landis DO;  Location: [facility] ENDOSCOPY;  Service: Gastroenterology       PT Ortho     Row Name 03/10/20 1400       Subjective Comments    Subjective Comments  Patient reports he is feeling better, but right knee has been bothering him more than usual the past few days. He reports clicking more than pain.    -SW       Precautions and Contraindications    Precautions/Limitations  no known precautions/limitations  -SW       Subjective Pain    Able to rate subjective pain?  yes  -SW    Pre-Treatment Pain Level  0  -SW    Post-Treatment Pain Level  0  -SW      User Key  (r) = Recorded By, (t) = Taken By, (c) = Cosigned By    Initials Name Provider  "Type    Pushpa Bateman Physical Therapist                      PT Assessment/Plan     Row Name 03/10/20 1400          PT Assessment    Assessment Comments  Patient was able to perform exercise regime without knee pain. He exhibited no extensor lag with SLR.    -        PT Plan    PT Frequency  2x/week  -     Predicted Duration of Therapy Intervention (Therapy Eval)  3 weeks  -     PT Plan Comments  Focus on hamstring and quad flexibility and  right LE strength. Add to standing activities at next visit.    -       User Key  (r) = Recorded By, (t) = Taken By, (c) = Cosigned By    Initials Name Provider Type    Pushpa Bateman Physical Therapist            OP Exercises     Row Name 03/10/20 1400             Subjective Comments    Subjective Comments  Patient reports he is feeling better, but right knee has been bothering him more than usual the past few days. He reports clicking more than pain.    -SW         Subjective Pain    Able to rate subjective pain?  yes  -SW      Pre-Treatment Pain Level  0  -SW      Post-Treatment Pain Level  0  -SW         Exercise 1    Exercise Name 1  Pro II L1  -SW      Time 1  10'  -SW         Exercise 2    Exercise Name 2  calf stretch mama bear  -SW      Reps 2  30\"x3  -SW         Exercise 3    Exercise Name 3  step ups 5\"  -SW      Reps 3  10 leading right, 10 leading left  -SW         Exercise 4    Exercise Name 4  553 airex  -SW         Exercise 5    Exercise Name 5  long arc quad 3#  -SW      Reps 5  20x5\"  -SW         Exercise 6    Exercise Name 6  long sit hamstring stretch with OP  -SW      Reps 6  30\"x3  -SW         Exercise 7    Exercise Name 7  SLR-flex/abd/ext 2#  -SW      Reps 7  20  -SW         Exercise 8    Exercise Name 8  prone quad stretch  -SW      Reps 8  30\"x3  -SW        User Key  (r) = Recorded By, (t) = Taken By, (c) = Cosigned By    Initials Name Provider Type    Pushpa Bateman Physical Therapist                       PT OP Goals     Row Name " 03/10/20 1400          PT Short Term Goals    STG Date to Achieve  12/31/19  -     STG 1  Able to perform 10 right SLR's with good eccentric control of R quads  -     STG 1 Progress  Ongoing;Progressing  -     STG 2  Improve R hip extension MMT to 5/5  -     STG 2 Progress  Met  -     STG 3  Able to stand for prolonged periods at work and descend stairs with no R knee pain  -     STG 3 Progress  Ongoing  -     STG 4  Improve right hamstrings flexibility to >/=70 deg with 90/90 SLR test  -     STG 4 Progress  Ongoing;Progressing  -     STG 5  Able to kneel at work for extended periods with minimal to no R knee pain  -     STG 5 Progress  Not Met  -        Time Calculation    PT Goal Re-Cert Due Date  03/24/20  -       User Key  (r) = Recorded By, (t) = Taken By, (c) = Cosigned By    Initials Name Provider Type    Pushpa Bateman Physical Therapist                         Time Calculation:   Start Time: 1347  Stop Time: 1428  Time Calculation (min): 41 min  Therapy Charges for Today     Code Description Service Date Service Provider Modifiers Qty    20166408024  PT THER PROC EA 15 MIN 3/10/2020 Pushpa Mansfield GP 3                    Pushpa Mansfield  3/10/2020

## 2020-03-11 ENCOUNTER — HOSPITAL ENCOUNTER (OUTPATIENT)
Dept: PHYSICAL THERAPY | Facility: HOSPITAL | Age: 60
Setting detail: THERAPIES SERIES
Discharge: HOME | End: 2020-03-11

## 2020-03-11 DIAGNOSIS — M25.561 CHRONIC PAIN OF RIGHT KNEE: ICD-10-CM

## 2020-03-11 DIAGNOSIS — M23.91 INTERNAL DERANGEMENT OF RIGHT KNEE: Primary | ICD-10-CM

## 2020-03-11 DIAGNOSIS — G89.29 CHRONIC PAIN OF RIGHT KNEE: ICD-10-CM

## 2020-03-11 PROCEDURE — 97110 THERAPEUTIC EXERCISES: CPT

## 2020-03-11 PROCEDURE — G0283 ELEC STIM OTHER THAN WOUND: HCPCS

## 2020-03-11 NOTE — THERAPY TREATMENT NOTE
"    Outpatient Physical Therapy Ortho Treatment Note  [facility]     Patient Name: Jeff Werner  : 1960  MRN: [id number]  Today's Date: 3/11/2020      Visit Date: 2020     Subjective Improvement \"It depends\"  Visits 3/3  Visits approved 4 from 3- to 2020  RTMD patient will call  Recert Date 3-    Right Knee Pain    Visit Dx:    ICD-10-CM ICD-9-CM   1. Internal derangement of right knee M23.91 717.9   2. Chronic pain of right knee M25.561 719.46    G89.29 338.29       Patient Active Problem List   Diagnosis   • Right knee pain   • Degenerative disc disease, lumbar   • Internal derangement of right knee   • Chronic midline low back pain without sciatica   • Mechanical pain of right knee   • Numbness and tingling of left leg   • COPD exacerbation (CMS/HCC)   • Acute respiratory failure with hypoxia (CMS/HCC)   • Hypertension   • Elevated cholesterol   • Tobacco abuse   • Hypokalemia   • Type A influenza        Past Medical History:   Diagnosis Date   • Arthritis of back    • Asthma    • COPD (chronic obstructive pulmonary disease) (CMS/HCC)    • Degenerative disc disease, lumbar    • Elevated cholesterol    • Hypertension    • Lumbosacral disc disease     history of disc hernations in the lumbar spine        Past Surgical History:   Procedure Laterality Date   • COLONOSCOPY N/A 2018    Procedure: COLONOSCOPY;  Surgeon: Jovanny Landis DO;  Location: [facility] ENDOSCOPY;  Service: Gastroenterology                       PT Assessment/Plan     Row Name 20 1527          PT Assessment    Assessment Comments  Patient did have increase pain with up and down the ramp backward.  Slight edema noted in distal right knee  -CP        PT Plan    PT Frequency  2x/week  -CP     Predicted Duration of Therapy Intervention (Therapy Eval)  3 weeks  -CP     PT Plan Comments  Cont with POC. review patients HEP  -CP       User Key  (r) = Recorded By, (t) = Taken By, (c) = Cosigned By    " Initials Name Provider Type    CP Claribel Gibbs PTA Physical Therapy Assistant          Modalities     Row Name 03/11/20 1400             Ice    Ice Applied  Yes  -CP      Location  right knee  -CP      Rx Minutes  15 mins  -CP      Ice S/P Rx  Yes  -CP         ELECTRICAL STIMULATION    Attended/Unattended  Unattended  -CP      Stimulation Type  IFC  -CP      Location/Electrode Placement/Other  right knee  -CP       PT E-Stim Unattended (Manual) Minutes  15  -CP        User Key  (r) = Recorded By, (t) = Taken By, (c) = Cosigned By    Initials Name Provider Type    CP Claribel Gibbs, ASHLEY Physical Therapy Assistant        OP Exercises     Row Name 03/11/20 1400             Subjective Comments    Subjective Comments  Patient states that he is tired today and didnt get much sleep last night.  He really doesnt have a lot of pain but knee is sore  -CP         Subjective Pain    Able to rate subjective pain?  yes  -CP      Pre-Treatment Pain Level  -- more sore  -CP      Post-Treatment Pain Level  0 numb from ice and IFC  -CP         Exercise 1    Exercise Name 1  Pro II level 2  -CP      Time 1  10  -CP         Exercise 2    Exercise Name 2  incline stretch  -CP      Cueing 2  Verbal  -CP      Sets 2  3  -CP      Time 2  30  -CP         Exercise 3    Exercise Name 3  standing HS stretch  -CP      Cueing 3  Verbal  -CP      Sets 3  3  -CP      Time 3  30  -CP         Exercise 4    Exercise Name 4  lunge stretch  -CP      Cueing 4  Verbal  -CP      Sets 4  3  -CP      Time 4  30  -CP         Exercise 5    Exercise Name 5  step up BOSU  -CP      Cueing 5  Verbal;Demo  -CP      Sets 5  2  -CP      Reps 5  10  -CP         Exercise 6    Exercise Name 6  side step BOSU  -CP      Cueing 6  Verbal  -CP      Sets 6  2  -CP      Reps 6  10  -CP         Exercise 7    Exercise Name 7  up and down ramp  -CP      Cueing 7  Verbal;Demo  -CP      Reps 7  5  -CP      Time 7  backward  -CP         Exercise 8    Exercise Name 8   cybex hip AB  -CP      Cueing 8  Verbal  -CP      Sets 8  3  -CP      Reps 8  10  -CP      Time 8  30lb  -CP         Exercise 9    Exercise Name 9  SLR  -CP      Cueing 9  Verbal  -CP      Sets 9  1  -CP      Reps 9  10  -CP         Exercise 10    Exercise Name 10  SLR wit ER  -CP      Cueing 10  Verbal  -CP      Sets 10  1  -CP      Reps 10  10  -CP        User Key  (r) = Recorded By, (t) = Taken By, (c) = Cosigned By    Initials Name Provider Type    CP Claribel Gibbs, ASHLEY Physical Therapy Assistant                       PT OP Goals     Row Name 03/11/20 1500          PT Short Term Goals    STG Date to Achieve  12/31/19  -CP     STG 1  Able to perform 10 right SLR's with good eccentric control of R quads  -CP     STG 1 Progress  Ongoing;Progressing  -CP     STG 2  Improve R hip extension MMT to 5/5  -CP     STG 2 Progress  Met  -CP     STG 3  Able to stand for prolonged periods at work and descend stairs with no R knee pain  -CP     STG 3 Progress  Ongoing  -CP     STG 4  Improve right hamstrings flexibility to >/=70 deg with 90/90 SLR test  -CP     STG 4 Progress  Ongoing;Progressing  -CP     STG 5  Able to kneel at work for extended periods with minimal to no R knee pain  -CP     STG 5 Progress  Not Met  -CP        Time Calculation    PT Goal Re-Cert Due Date  03/24/20  -CP       User Key  (r) = Recorded By, (t) = Taken By, (c) = Cosigned By    Initials Name Provider Type    Claribel Feng PTA Physical Therapy Assistant          Therapy Education  Education Details: SLR and SLR with ER  Given: HEP  Program: New  How Provided: Verbal, Demonstration  Provided to: Patient  Level of Understanding: Teach back education performed, Verbalized, Demonstrated              Time Calculation:   Start Time: 1430  Stop Time: 1540  Time Calculation (min): 70 min  Total Timed Code Minutes- PT: 45 minute(s)  Therapy Charges for Today     Code Description Service Date Service Provider Modifiers Qty    40269473101  PT  ELECTRICAL STIM UNATTENDED 3/11/2020 Claribel Gibbs, PTA  1    73929253127 HC PT THER PROC EA 15 MIN 3/11/2020 Claribel Gibbs, ASHLEY GP 3                    Claribel Gibbs PTA  3/11/2020

## 2020-03-17 ENCOUNTER — HOSPITAL ENCOUNTER (OUTPATIENT)
Dept: PHYSICAL THERAPY | Facility: HOSPITAL | Age: 60
Setting detail: THERAPIES SERIES
Discharge: HOME | End: 2020-03-17

## 2020-03-17 DIAGNOSIS — M25.561 CHRONIC PAIN OF RIGHT KNEE: ICD-10-CM

## 2020-03-17 DIAGNOSIS — G89.29 CHRONIC PAIN OF RIGHT KNEE: ICD-10-CM

## 2020-03-17 DIAGNOSIS — M23.91 INTERNAL DERANGEMENT OF RIGHT KNEE: Primary | ICD-10-CM

## 2020-03-17 PROCEDURE — 97110 THERAPEUTIC EXERCISES: CPT

## 2020-03-17 PROCEDURE — G0283 ELEC STIM OTHER THAN WOUND: HCPCS

## 2020-03-17 NOTE — THERAPY TREATMENT NOTE
Outpatient Physical Therapy Ortho Treatment Note  [facility]     Patient Name: Jeff Werner  : 1960  MRN: [id number]  Today's Date: 3/17/2020      Visit Date: 2020     Subjective Improvement not really  Visits 4/4  Visits approved 4 from 3- to 2020  RTMD PRN  Recert Date    Right knee pain    Visit Dx:    ICD-10-CM ICD-9-CM   1. Internal derangement of right knee M23.91 717.9   2. Chronic pain of right knee M25.561 719.46    G89.29 338.29       Patient Active Problem List   Diagnosis   • Right knee pain   • Degenerative disc disease, lumbar   • Internal derangement of right knee   • Chronic midline low back pain without sciatica   • Mechanical pain of right knee   • Numbness and tingling of left leg   • COPD exacerbation (CMS/HCC)   • Acute respiratory failure with hypoxia (CMS/HCC)   • Hypertension   • Elevated cholesterol   • Tobacco abuse   • Hypokalemia   • Type A influenza        Past Medical History:   Diagnosis Date   • Arthritis of back    • Asthma    • COPD (chronic obstructive pulmonary disease) (CMS/HCC)    • Degenerative disc disease, lumbar    • Elevated cholesterol    • Hypertension    • Lumbosacral disc disease     history of disc hernations in the lumbar spine        Past Surgical History:   Procedure Laterality Date   • COLONOSCOPY N/A 2018    Procedure: COLONOSCOPY;  Surgeon: Jovanny Landis DO;  Location: [facility] ENDOSCOPY;  Service: Gastroenterology                       PT Assessment/Plan     Row Name 20 1340          PT Assessment    Assessment Comments  Patient is TTP to medial right knee.  Quad lag after 13 SLR  -CP        PT Plan    PT Frequency  2x/week  -CP     Predicted Duration of Therapy Intervention (Therapy Eval)  3 weeks  -CP     PT Plan Comments  Cont with POC.  One more approved visit.  Patient is going to call his MD for MRI of right knee.  -CP       User Key  (r) = Recorded By, (t) = Taken By, (c) = Cosigned By    Initials Name  Provider Type    CP Claribel Gibbs PTA Physical Therapy Assistant          Modalities     Row Name 03/17/20 1300             Ice    Ice Applied  Yes  -CP      Location  right knee  -CP      Rx Minutes  15 mins  -CP      Ice S/P Rx  Yes  -CP         ELECTRICAL STIMULATION    Attended/Unattended  Unattended  -CP      Stimulation Type  IFC  -CP      Location/Electrode Placement/Other  right knee  -CP       PT E-Stim Unattended (Manual) Minutes  15  -CP        User Key  (r) = Recorded By, (t) = Taken By, (c) = Cosigned By    Initials Name Provider Type    CP Claribel Gibbs PTA Physical Therapy Assistant        OP Exercises     Row Name 03/17/20 1300             Subjective Comments    Subjective Comments  Patient states that his knee feels like it has to pop.  He doesnt have a return visit to MD  -CP         Subjective Pain    Able to rate subjective pain?  yes  -CP      Pre-Treatment Pain Level  -- feels like it has to pop  -CP      Post-Treatment Pain Level  -- numb from IFC and ice  -CP         Exercise 1    Exercise Name 1  Pro II level 3  -CP      Time 1  10  -CP         Exercise 2    Exercise Name 2  incline stretch  -CP      Cueing 2  Verbal  -CP      Sets 2  3  -CP      Time 2  30  -CP         Exercise 3    Exercise Name 3  standing HS stretch  -CP      Cueing 3  Verbal  -CP      Sets 3  3  -CP      Time 3  30  -CP         Exercise 4    Exercise Name 4  lunge stretch  -CP      Cueing 4  Verbal  -CP      Sets 4  3  -CP      Time 4  30  -CP         Exercise 5    Exercise Name 5  step up BOSU  -CP      Cueing 5  Verbal  -CP      Sets 5  2  -CP      Reps 5  10  -CP         Exercise 6    Exercise Name 6  side step BOSU  -CP      Cueing 6  Verbal  -CP      Sets 6  2  -CP      Reps 6  10  -CP         Exercise 7    Exercise Name 7  up and down ramp  -CP      Cueing 7  Verbal  -CP      Reps 7  5  -CP      Time 7  forward and back bar  -CP         Exercise 8    Exercise Name 8  side stepping on foam  -CP       Cueing 8  Verbal  -CP      Reps 8  5  -CP         Exercise 9    Exercise Name 9  SL stance  -CP      Cueing 9  Verbal  -CP      Sets 9  1  -CP      Time 9  30  -CP         Exercise 10    Exercise Name 10  SLR  -CP      Cueing 10  Verbal  -CP      Sets 10  2  -CP      Reps 10  10  -CP        User Key  (r) = Recorded By, (t) = Taken By, (c) = Cosigned By    Initials Name Provider Type    CP Claribel Gibbs PTA Physical Therapy Assistant                       PT OP Goals     Row Name 03/17/20 1300          PT Short Term Goals    STG Date to Achieve  12/31/19  -CP     STG 1  Able to perform 10 right SLR's with good eccentric control of R quads  -CP     STG 1 Progress  Ongoing;Progressing  -CP     STG 2  Improve R hip extension MMT to 5/5  -CP     STG 2 Progress  Met  -CP     STG 3  Able to stand for prolonged periods at work and descend stairs with no R knee pain  -CP     STG 3 Progress  Ongoing  -CP     STG 4  Improve right hamstrings flexibility to >/=70 deg with 90/90 SLR test  -CP     STG 4 Progress  Ongoing;Progressing  -CP     STG 5  Able to kneel at work for extended periods with minimal to no R knee pain  -CP     STG 5 Progress  Not Met  -CP        Time Calculation    PT Goal Re-Cert Due Date  03/24/20  -CP       User Key  (r) = Recorded By, (t) = Taken By, (c) = Cosigned By    Initials Name Provider Type    CP Claribel Gibbs PTA Physical Therapy Assistant                         Time Calculation:   Start Time: 1303  Stop Time: 1410  Time Calculation (min): 67 min  Total Timed Code Minutes- PT: 40 minute(s)  Therapy Charges for Today     Code Description Service Date Service Provider Modifiers Qty    30316835326 HC PT ELECTRICAL STIM UNATTENDED 3/17/2020 Claribel Gibbs PTA  1    11030039287 HC PT THER PROC EA 15 MIN 3/17/2020 Claribel Gibbs PTA GP 3                    Claribel Gibbs PTA  3/17/2020

## 2020-03-18 ENCOUNTER — HOSPITAL ENCOUNTER (OUTPATIENT)
Dept: PHYSICAL THERAPY | Facility: HOSPITAL | Age: 60
Setting detail: THERAPIES SERIES
Discharge: HOME | End: 2020-03-18

## 2020-03-18 DIAGNOSIS — M23.91 INTERNAL DERANGEMENT OF RIGHT KNEE: Primary | ICD-10-CM

## 2020-03-18 DIAGNOSIS — M25.561 CHRONIC PAIN OF RIGHT KNEE: ICD-10-CM

## 2020-03-18 DIAGNOSIS — G89.29 CHRONIC PAIN OF RIGHT KNEE: ICD-10-CM

## 2020-03-18 PROCEDURE — 97110 THERAPEUTIC EXERCISES: CPT | Performed by: PHYSICAL THERAPIST

## 2020-03-18 PROCEDURE — 97530 THERAPEUTIC ACTIVITIES: CPT | Performed by: PHYSICAL THERAPIST

## 2020-03-18 NOTE — THERAPY TREATMENT NOTE
"    Outpatient Physical Therapy Ortho Progress Note  [facility]     Patient Name: Jeff Werner  : 1960  MRN: [id number]  Today's Date: 3/18/2020      Visit Date: 2020  Subjective Improvement some days no pain, some days really bothering him feeling like it is going to pop out of place  Visits 5/5 waiting auth for more visits  Visits approved 4 from 3- to 2020  RTMD 5-10-20  Recert Date 20     Right knee pain  Visit Dx:    ICD-10-CM ICD-9-CM   1. Internal derangement of right knee M23.91 717.9   2. Chronic pain of right knee M25.561 719.46    G89.29 338.29       Patient Active Problem List   Diagnosis   • Right knee pain   • Degenerative disc disease, lumbar   • Internal derangement of right knee   • Chronic midline low back pain without sciatica   • Mechanical pain of right knee   • Numbness and tingling of left leg   • COPD exacerbation (CMS/HCC)   • Acute respiratory failure with hypoxia (CMS/HCC)   • Hypertension   • Elevated cholesterol   • Tobacco abuse   • Hypokalemia   • Type A influenza        Past Medical History:   Diagnosis Date   • Arthritis of back    • Asthma    • COPD (chronic obstructive pulmonary disease) (CMS/HCC)    • Degenerative disc disease, lumbar    • Elevated cholesterol    • Hypertension    • Lumbosacral disc disease     history of disc hernations in the lumbar spine        Past Surgical History:   Procedure Laterality Date   • COLONOSCOPY N/A 2018    Procedure: COLONOSCOPY;  Surgeon: Jovanny Landis DO;  Location: [facility] ENDOSCOPY;  Service: Gastroenterology       PT Ortho     Row Name 20 1100       Subjective Comments    Subjective Comments  Patient reports no pain, but he continues to experience the sensation of feeling like the knee is \"popping\" out of place.   -SW       Subjective Pain    Able to rate subjective pain?  yes  -SW    Pre-Treatment Pain Level  0  -SW    Post-Treatment Pain Level  0  -SW       Knee Palpation    Knee " Palpation?  -- no tenderness to right knee including MJL  -SW       Knee Special Tests    Anterior drawer (ACL lesion)  Right:;Negative  -SW    Lachman’s (ACL lesion)  Right:;Negative  -SW    Posterior drawer (PCL lesion)  Right:;Negative  -SW    Posterior sag sign (PCL lesion)  Right:;Negative  -SW    Valgus stress (MCL lesion)  Right:;Negative  -SW    Varus stress (LCL lesion)  Right:;Negative  -SW    Jsoe’s test (meniscal lesion)  Right:;Positive  -SW       General ROM    GENERAL ROM COMMENTS  right knee AROM WNL's  -SW       MMT Right Lower Ext    Rt Hip Flexion MMT, Gross Movement  (5/5) normal  -SW    Rt Hip Extension MMT, Gross Movement  (4/5) good  -SW    Rt Hip ABduction MMT, Gross Movement  (4+/5) good plus  -SW    Rt Knee Extension MMT, Gross Movement  (5/5) normal  -SW    Rt Knee Flexion MMT, Gross Movement  (5/5) normal  -SW      User Key  (r) = Recorded By, (t) = Taken By, (c) = Cosigned By    Initials Name Provider Type    SW Pushpa Mansfield Physical Therapist                      PT Assessment/Plan     Row Name 03/18/20 1100          PT Assessment    Functional Limitations  Impaired gait;Performance in work activities  -     Impairments  Impaired flexibility;Pain;Motor function  -     Assessment Comments  A palpable clicking can be felt at right medial joint line; however, patient has not tenderness. He exhibits full right knee ROM and good quad control. He still has difficulty with stair navigation and kneeling and reduced right hip strength.  -SW     Rehab Potential  Good  -SW     Patient/caregiver participated in establishment of treatment plan and goals  Yes  -SW     Patient would benefit from skilled therapy intervention  Yes  -SW        PT Plan    PT Frequency  2x/week  -     Predicted Duration of Therapy Intervention (Therapy Eval)  3 more weeks  -SW     Planned CPT's?  PT RE-EVAL: 67247;PT THER PROC EA 15 MIN: 33912;PT THER ACT EA 15 MIN: 00860;PT MANUAL THERAPY EA 15 MIN: 48693;PT  "NEUROMUSC RE-EDUCATION EA 15 MIN: 64647;PT SELF CARE/HOME MGMT/TRAIN EA 15: 01582;PT HOT OR COLD PACK TREAT MCARE  -SW     Physical Therapy Interventions (Optional Details)  manual therapy techniques;postural re-education;ROM (Range of Motion);stair training;strengthening;stretching  -SW     PT Plan Comments  Focus on right hip and knee strength.   -SW       User Key  (r) = Recorded By, (t) = Taken By, (c) = Cosigned By    Initials Name Provider Type    Pushpa Bateman Physical Therapist            OP Exercises     Row Name 03/18/20 1100             Subjective Comments    Subjective Comments  Patient reports no pain, but he continues to experience the sensation of feeling like the knee is \"popping\" out of place.   -SW         Subjective Pain    Able to rate subjective pain?  yes  -SW      Pre-Treatment Pain Level  0  -SW      Post-Treatment Pain Level  0  -SW         Exercise 1    Exercise Name 1  Pro II L4  -SW      Time 1  10'  -SW         Exercise 2    Exercise Name 2  calf stretch mama bear  -SW      Reps 2  30\"x3  -SW         Exercise 3    Exercise Name 3  step ups 5\"  -SW      Reps 3  10 leading right, 10 leading left  -SW         Exercise 4    Exercise Name 4  553 airex  -SW         Exercise 5    Exercise Name 5  long arc quad 3#  -SW      Reps 5  20x5\"  -SW         Exercise 6    Exercise Name 6  long sit hamstring stretch with OP  -SW      Reps 6  30\"x3  -SW         Exercise 7    Exercise Name 7  SLR-flex/abd/ext 2#  -SW      Reps 7  20  -SW         Exercise 8    Exercise Name 8  prone quad stretch  -SW      Reps 8  30\"x3  -SW        User Key  (r) = Recorded By, (t) = Taken By, (c) = Cosigned By    Initials Name Provider Type    Pushpa Bateman Physical Therapist                       PT OP Goals     Row Name 03/18/20 1100          PT Short Term Goals    STG Date to Achieve  04/08/20  -SW     STG 1  Able to perform 10 right SLR's with good eccentric control of R quads  -SW     STG 1 Progress  Met  -SW     " STG 2  Improve R hip extension MMT to 5/5  -SW     STG 2 Progress  Not Met  -SW     STG 2 Progress Comments  4/5  -SW     STG 3  Able to stand for prolonged periods at work and descend stairs with no R knee pain  -SW     STG 3 Progress  Ongoing;Progressing  -SW     STG 3 Progress Comments  most of the time  -SW     STG 4  Improve right hamstrings flexibility to >/=70 deg with 90/90 SLR test  -SW     STG 4 Progress  Ongoing;Progressing  -SW     STG 5  Able to kneel at work for extended periods with minimal to no R knee pain  -     STG 5 Progress  Not Met  -SW        Time Calculation    PT Goal Re-Cert Due Date  04/08/20  -SW       User Key  (r) = Recorded By, (t) = Taken By, (c) = Cosigned By    Initials Name Provider Type    Pushpa Bateman Physical Therapist               Outcome Measure Options: Lower Extremity Functional Scale (LEFS)  Lower Extremity Functional Index  Any of your usual work, housework or school activities: A little bit of difficulty  Your usual hobbies, recreational or sporting activities: A little bit of difficulty  Getting into or out of the bath: No difficulty  Walking between rooms: No difficulty  Putting on your shoes or socks: No difficulty  Squatting: Moderate difficulty  Lifting an object, like a bag of groceries from the floor: A little bit of difficulty  Performing light activities around your home: A little bit of difficulty  Performing heavy activities around your home: Moderate difficulty  Getting into or out of a car: No difficulty  Walking 2 blocks: Moderate difficulty  Walking a mile: Quite a bit of difficulty  Going up or down 10 stairs (about 1 flight of stairs): Moderate difficulty  Standing for 1 hour: Quite a bit of difficulty  Sitting for 1 hour: A little bit of difficulty  Running on even ground: Quite a bit of difficulty  Running on uneven ground: Quite a bit of difficulty  Making sharp turns while running fast: Quite a bit of difficulty  Hopping: Quite a bit of  difficulty  Rolling over in bed: No difficulty  Total: 49      Time Calculation:   Start Time: 1103  Stop Time: 1147  Time Calculation (min): 44 min  Therapy Charges for Today     Code Description Service Date Service Provider Modifiers Qty    89248286752  PT THER PROC EA 15 MIN 3/18/2020 Pushpa Mansfield GP 2    [id number]  PT THERAPEUTIC ACT EA 15 MIN 3/18/2020 Pushpa Mansfield GP 1          PT G-Codes  Outcome Measure Options: Lower Extremity Functional Scale (LEFS)  Total: 49         Pushpa Mansfield  3/18/2020

## 2020-03-24 ENCOUNTER — APPOINTMENT (OUTPATIENT)
Dept: PHYSICAL THERAPY | Facility: HOSPITAL | Age: 60
End: 2020-03-24

## 2020-05-06 ENCOUNTER — TELEPHONE (OUTPATIENT)
Dept: ORTHOPEDIC SURGERY | Facility: CLINIC | Age: 60
End: 2020-05-06

## 2020-05-06 NOTE — TELEPHONE ENCOUNTER
Patient reports he is still having pain and issues. Therapy was stopped due to the shutdown and COVID 19. He states he has a telehealth appointment with his PCP and will contact us back at the beginning of next week to let us know what he decides to do at this point.

## 2020-05-06 NOTE — TELEPHONE ENCOUNTER
----- Message from CRISTOBAL Bliss sent at 5/6/2020 11:01 AM CDT -----  Regarding: follow up  Will you please reach out to Mr. Werner and see how he is doing? He was previously in the middle of doing physical therapy in March when the remainder of his appointments were cancelled due to COVID. His insurance had previously denied the MRIs of the right knee and lumbar spine and wanted him to do more conservative treatment first. If he is still having any right knee pain/issues or low back pain, I would recommend he follow up with me in office and I can resubmit those MRIs and continue treating him. If he is doing ok currently, he can follow up as needed. I just didn't want him to slip through the cracks since he care was interrupted due to COVID. Thanks!

## 2021-02-09 DIAGNOSIS — R07.9 CHEST PAIN, UNSPECIFIED TYPE: Primary | ICD-10-CM

## 2021-03-01 NOTE — H&P
Cardiology History and Physical Note        Patient Name: Jeff eWrner  Age/Sex: 60 y.o. male  : 1960  MRN: [id number]    Date of Admission : 2021    Primary care Physician: Arabella Luu APRN    Reason for Admission: Chest pain Lexiscan Cardiolite stress test    Subjective:       Chief Complaint: Chest pain    History of Present Illness:  Jeff Werner is a 60 y.o. male     With a height of 5 feet 9 inches weight of 185 pounds with a body mass index of 27 with a past medical history significant for arterial hypertension, hypertensive heart disease, hyperlipidemia, chronic obstructive lung disease, allergic to ACE inhibitor and angiotensin receptor blocker, tobacco abuse and strong family history for coronary artery disease.  Patient was last hospitalized in 2020 with chronic obstructive lung disease exacerbation.  Patient has not had previous cardiac evaluation.    Patient presents for further evaluation for symptoms of chest pain and shortness of breath.  Patient over the last 2 to 3 months has been having increasing episodes of shortness of breath palpitation along with atypical symptoms of chest pain.  Patient complained of having symptoms of chest pain with exertion relieved with rest.  Patient on questioning denies any lightheaded dizziness or near syncope.  Patient denies any fever chills productive cough.  Patient does consume coffee on a daily basis.    Patient has had previous evaluation and had COVID-19 negative.    Patient on further questioning denies any lower extremity edema.  Patient denies any paroxysmal nocturnal dyspnea orthopnea.      Patient blood pressure today is 140/80 pulse of 105 respiration of 18 oxygen saturation of 97%.    Patient resting electrocardiogram done revealed sinus tachycardia with nonspecific ST-T wave changes.    Patient 10 point review of system except for stated in the history of present is negative.      Concurrent  Medical History:  1.   Chest pain shortness of breath palpitation.  2.  Sinus tachycardia.  3.  Arterial hypertension.  4.  Hypertensive heart disease.  5.  Hyperlipidemia intolerance to statin.  6.  Chronic obstructive lung disease.  7.  History of asthmatic bronchitis.  8.  Degenerative disc disease.  9.  Lumbosacral disc disease with chronic back pain.  10.  Allergic reaction to ACE inhibitor and angiotensin receptor blocker      Past Surgical History:  1.  Colonoscopy.      Family History: Family history significant for father who has coronary artery disease mother who had myocardial infarction and sister who has myocardial infarction.      Social History: Smokes up to half pack per day social alcohol intake works at [employer].       Cardiac Risk factor:   1.  Male.  2.  Arterial hypertension.  3.  Tobacco abuse.  4.  Hyperlipidemia.  5.  Family history for coronary artery disease.    Allergies:  Allergies   Allergen Reactions   • Carvedilol Anaphylaxis   • Losartan Anaphylaxis   • Lisinopril Angioedema       Home Medication::  Prior to Admission medications    Medication Sig Start Date End Date Taking? Authorizing Provider   acetaminophen (TYLENOL) 500 MG tablet Take 500 mg by mouth Every 6 (Six) Hours As Needed for Mild Pain .    ProviderMay MD   albuterol (PROVENTIL HFA;VENTOLIN HFA) 108 (90 Base) MCG/ACT inhaler Inhale 2 puffs Every 4 (Four) Hours As Needed for Wheezing.    ProviderMay MD   guaiFENesin (MUCINEX) 600 MG 12 hr tablet Take 1 tablet by mouth Every 12 (Twelve) Hours. 2/3/20   Madelyn Alejandro APRN   hydrochlorothiazide (MICROZIDE) 12.5 MG capsule Take 12.5 mg by mouth Daily.    ProviderMay MD   predniSONE (DELTASONE) 10 MG tablet 40 mg oral daily x 2 days, then 30 mg oral daily x 2 days, then 20 mg oral daily x 2 days, then 10 mg oral daily x 2 days. 2/3/20   Madelyn Alejandro APRN         Review of Systems   Constitutional: Positive for fatigue. Negative for chills, fever and unexpected  weight change.   HENT: Negative for hearing loss and nosebleeds.    Eyes: Negative for visual disturbance.   Respiratory: Positive for chest tightness and shortness of breath. Negative for cough and wheezing.    Cardiovascular: Positive for chest pain and palpitations. Negative for leg swelling.   Gastrointestinal: Negative for abdominal pain, blood in stool, constipation, diarrhea, nausea and vomiting.   Endocrine: Negative for cold intolerance, heat intolerance, polydipsia, polyphagia and polyuria.   Genitourinary: Negative for hematuria.   Musculoskeletal: Positive for arthralgias and back pain. Negative for joint swelling, myalgias and neck pain.   Skin: Negative for color change, rash and wound.   Neurological: Negative for dizziness, seizures, syncope, light-headedness, numbness and headaches.   Hematological: Does not bruise/bleed easily.         Objective:     There were no vitals taken for this visit.  140/80 pulse of 105 respiration of 18 oxygen saturation of 98%.  Constitutional:       Appearance: Well-developed.   Eyes:      General: No scleral icterus.     Conjunctiva/sclera: Conjunctivae normal.      Pupils: Pupils are equal, round, and reactive to light.   HENT:      Head: Normocephalic and atraumatic.      Left Ear: External ear normal.      Nose: Nose normal.   Neck:      Musculoskeletal: Normal range of motion and neck supple.      Thyroid: No thyromegaly.      Vascular: No JVD.      Trachea: No tracheal deviation.      Lymphadenopathy: No cervical adenopathy.   Pulmonary:      Breath sounds: Normal breath sounds. No stridor.   Cardiovascular:      Normal rate. Regular rhythm.   Abdominal:      General: Bowel sounds are normal.   Musculoskeletal: Normal range of motion.   Skin:     General: Skin is warm and dry.   Neurological:      Mental Status: Alert and oriented to person, place, and time.      Deep Tendon Reflexes: Reflexes are normal and symmetric.   Psychiatric:         Behavior: Behavior  normal.         Thought Content: Thought content normal.         Judgment: Judgment normal.         Lab Review:           Invalid input(s): LABALBU, PROT                                    EKG:   ECG/EMG Results (last 24 hours)     ** No results found for the last 24 hours. **          Imaging:  Imaging Results (Last 24 Hours)     ** No results found for the last 24 hours. **          I personally viewed and interpreted the patient's EKG/Telemetry data.    Assessment:   1.  Chest pain.  2.  Sinus tachycardia.  3.  Arterial hypertension.  4.  Hypertensive heart disease.  5.  Hyperlipidemia.  6.  Chronic obstructive lung disease.          Plan:   1.  Chest pain.  Patient does have history of arterial hypertension hyperlipidemia and tobacco abuse and strong family history for coronary artery disease.  Patient has had symptoms of chest discomfort which is suggestive of possible angina associated with symptoms of shortness of breath and palpitation.  Patient resting electrocardiogram does not reveal of any acute ST-T wave changes.  Patient at the present time has been recommended to start aspirin 81 mg once a day.  Patient has been recommended to undergo a transthoracic echocardiogram to evaluate the left ventricular systolic function and to rule out regional segmental wall motion abnormality and Lexiscan Cardiolite stress test.  Risk-benefit treatment option for the Lexiscan Cardiolite stress test were discussed with the patient and an informed consent was obtained.    2.  Arterial hypertension.  Patient blood pressure is 140/80.  Patient is currently on HCTZ.  The patient has had sinus tachycardia with elevated blood pressure with inability to take beta-blocker or ACE inhibitor patient would be started on diltiazem 120 mg once a day.  Patient has been recommended to stop the HCTZ.    3.  Hypertensive heart disease.  Clinically at the present time patient is not in congestive heart failure.  Patient is to undergo a  transthoracic echocardiogram.    4.  Hyperlipidemia.  Patient has been counseled on low-fat low-cholesterol diet.  Patient has had intolerance to statin.  Patient is to undergo lipid profile check and if the patient has elevated LDL patient would be started on Zetia.    5.  Risk factor modification.  Patient has been counseled to quit smoking.  Patient has been explained the risk associated with smoking in the occurrence and progression of atherosclerotic coronary artery disease and peripheral vascular disease.    6.  Chronic obstructive lung disease with tobacco abuse.  Patient is currently on albuterol inhaler and Breo inhaler.    Plan of management were discussed with the patient      Time: time spent in face-to-face evaluation of greater than 55 minutes and interacting and formulating examining and discussing the plan with the patient with 50% of greater time spent in face-to-face interaction.    Electronically signed by Andrzej Ortiz MD, 02/28/21, 6:29 PM CST.      Dictated utilizing Dragon dictation.

## 2021-03-04 ENCOUNTER — HOSPITAL ENCOUNTER (OUTPATIENT)
Dept: CARDIOLOGY | Facility: HOSPITAL | Age: 61
Discharge: HOME | End: 2021-03-04

## 2021-03-04 ENCOUNTER — APPOINTMENT (OUTPATIENT)
Dept: NUCLEAR MEDICINE | Facility: HOSPITAL | Age: 61
End: 2021-03-04

## 2021-03-11 ENCOUNTER — OFFICE VISIT (OUTPATIENT)
Dept: SLEEP MEDICINE | Facility: HOSPITAL | Age: 61
End: 2021-03-11

## 2021-03-11 VITALS
HEART RATE: 118 BPM | WEIGHT: 190.2 LBS | HEIGHT: 69 IN | BODY MASS INDEX: 28.17 KG/M2 | DIASTOLIC BLOOD PRESSURE: 78 MMHG | SYSTOLIC BLOOD PRESSURE: 133 MMHG | OXYGEN SATURATION: 96 %

## 2021-03-11 DIAGNOSIS — G47.9 SLEEP DISTURBANCES: ICD-10-CM

## 2021-03-11 DIAGNOSIS — R06.81 WITNESSED EPISODE OF APNEA: Primary | ICD-10-CM

## 2021-03-11 DIAGNOSIS — G47.33 OSA (OBSTRUCTIVE SLEEP APNEA): ICD-10-CM

## 2021-03-11 PROCEDURE — 99202 OFFICE O/P NEW SF 15 MIN: CPT | Performed by: INTERNAL MEDICINE

## 2021-03-11 RX ORDER — DILTIAZEM HYDROCHLORIDE 120 MG/1
180 CAPSULE, COATED, EXTENDED RELEASE ORAL DAILY
COMMUNITY
Start: 2021-02-09

## 2021-03-11 RX ORDER — CETIRIZINE HYDROCHLORIDE 10 MG/1
10 TABLET ORAL DAILY
COMMUNITY
Start: 2021-03-01

## 2021-03-11 NOTE — PROGRESS NOTES
"      [facility]  [address] [place] [address] Drive  [place], [place] [address]  Phone: [phone]  Fax: [phone]      New Patient Sleep Medicine Consultation  Sleep Visit Only    Encounter Date: 3/11/2021         Patient's PCP: Arabella Luu APRN  Referring provider: No ref. provider found      CHIEF COMPLAINT:  :   Chief Complaint   Patient presents with   • Fatigue     neck---- 16\"   • Heartburn   • Unable To Fall Asleep   • Unable To Stay Asleep   • Daytime Sleepiness   • Morning Headaches   • Snoring   • Witnessed Apnea        Encounter Date: 3/11/2021         Patient's PCP: Arabella Luu APRN  Referring provider: No ref. provider found  Reason for consultation chief complaint: witnessed apneas    HISTORY OF PRESENT ILLNESS:   Referred by PPC for Sleep Evaluation for sleep apnea evaluation    Jeff Werner is a pleasant 60 y.o. male whose bedtime is ~ very variable, No Specific patterns  buyt try to sleep 5 hours and try to nap for 1-2 hours, No Nocturia, No turn and Toss on bed, No Caffeine seeking behavior, No Energy Drinks, No Alcoholic drinks, patient does not have caffeine seeking behavior patient reports witnessed apnea by his wife and thus the main reason why he was referred to the sleep clinic.    Jeff Werner admits to snoring, unrestful sleep, High blod pressure, gasping during sleep, excessive daytime sleepiness, stop breathing during sleep, sleeping less than 6 hours per night, sleepy driving and difficulty falling asleep. He denies cataplexy, sleep paralysis, or hypnagogic hallucinations. He does not take sedatives or hypnotics. He has rare sleepiness sleepiness with driving. He naps  3/7 a week.  Patient has a heavy smoking history and he has been diagnosed with COPD, patient is trying to quit smoking now he is using nicotine patch and as per patient information he has been referred to see a pulmonologist, patient was told that he has sinus pot in his long time he " failed to follow-up the 6-month follow-up due to the COVID-19 pandemic.     He continues to smoke. Smoking history: smoked 2 PPD for 20 years, 1 PPD for 42 Years, now cutting down to less than a half a pack  Patient  Medical history is pertinent for hypertension, COPD, chronic back pain syndrome, DJD, among others condition, patient has albuterol as a rescue inhaler and he is on Brio Ellipta 100--25 mcg.    Nicotine vaping: No  No POT smoker: Not in a Long time    Occupation:  Off Work  No significant Occupational exposure History    PAST MEDICAL AND SURGICAL HISTORIES:    Past Medical History:   Diagnosis Date   • Arthritis of back    • Asthma    • COPD (chronic obstructive pulmonary disease) (CMS/HCC)    • Degenerative disc disease, lumbar    • Elevated cholesterol    • Hypertension    • Lumbosacral disc disease     history of disc hernations in the lumbar spine     Past Surgical History:   Procedure Laterality Date   • COLONOSCOPY N/A 9/26/2018    Procedure: COLONOSCOPY;  Surgeon: Jovanny Landis DO;  Location: [facility] ENDOSCOPY;  Service: Gastroenterology       Allergies   Allergen Reactions   • Carvedilol Anaphylaxis   • Losartan Anaphylaxis   • Lisinopril Angioedema         Family History   Problem Relation Age of Onset   • Cancer Mother    • COPD Father    • Lung disease Father    • Diabetes Sister    • Stroke Sister      Social History     Socioeconomic History   • Marital status:      Spouse name: Not on file   • Number of children: Not on file   • Years of education: Not on file   • Highest education level: Not on file   Tobacco Use   • Smoking status: Current Every Day Smoker     Packs/day: 1.00     Years: 40.00     Pack years: 40.00   • Smokeless tobacco: Never Used   Substance and Sexual Activity   • Alcohol use: Yes     Alcohol/week: 2.0 - 4.0 standard drinks     Types: 2 - 4 Cans of beer per week     Comment: daily   • Drug use: Defer   • Sexual activity: Defer       MEDICATIONS:    Current  "Outpatient Medications:   •  acetaminophen (TYLENOL) 500 MG tablet, Take 500 mg by mouth Every 6 (Six) Hours As Needed for Mild Pain ., Disp: , Rfl:   •  albuterol (PROVENTIL HFA;VENTOLIN HFA) 108 (90 Base) MCG/ACT inhaler, Inhale 2 puffs Every 4 (Four) Hours As Needed for Wheezing., Disp: , Rfl:   •  guaiFENesin (MUCINEX) 600 MG 12 hr tablet, Take 1 tablet by mouth Every 12 (Twelve) Hours., Disp: , Rfl:   •  cetirizine (zyrTEC) 10 MG tablet, Take 10 mg by mouth Daily., Disp: , Rfl:   •  dilTIAZem CD (CARDIZEM CD) 120 MG 24 hr capsule, Take 120 mg by mouth Daily., Disp: , Rfl:   •  Fluticasone Furoate-Vilanterol (Breo Ellipta) 100-25 MCG/INH inhaler, Daily., Disp: , Rfl:       Review of Systems   Constitutional: Positive for fatigue. Negative for fever.   HENT: Positive for postnasal drip. Negative for congestion and nosebleeds.    Eyes: Negative for pain.   Respiratory: Positive for apnea, cough, shortness of breath and wheezing.    Cardiovascular: Negative for chest pain and leg swelling.   Gastrointestinal: Negative for blood in stool.   Endocrine: Negative for heat intolerance.   Genitourinary: Negative for hematuria.   Musculoskeletal: Positive for arthralgias and back pain. Negative for myalgias.   Allergic/Immunologic: Negative for immunocompromised state.   Neurological: Negative for seizures.   Hematological: Does not bruise/bleed easily.   Psychiatric/Behavioral: Positive for sleep disturbance. Negative for hallucinations.       OBJECTIVE:    Vitals:    03/11/21 1508   BP: 133/78   Pulse: 118   SpO2: 96%         03/11/21  1508   Weight: 86.3 kg (190 lb 3.2 oz)     Body mass index is 28.07 kg/m². Patient's Body mass index is 28.07 kg/m².     Neck circumference: 16\"  [place]: 4    PHYSICAL EXAM:  Physical Exam          General: Alert. Cooperative. Well developed. No acute distress.             Head:  Normocephalic. Symmetrical. Atraumatic.                Nose: No septal deviation. No drainage.          " Throat: No oral lesions. No thrush. Moist mucous membranes.    Tongue is normal    Dentition is fair       Pharynx: Posterior pharyngeal pillars are wide    Mallampati score of II (hard and soft palate, upper portion of tonsils anduvula visible)    Pharynx is normal with unrermarkable tonsils   Chest Wall:  Normal shape. Symmetric expansion with respiration. No tenderness.             Neck:  Trachea midline. No Stridor          Lungs:  Clear to auscultation bilaterally. No wheezes. No rhonchi. No rales. Respirations regular, even and unlabored.            Heart:  Regular rhythm and normal rate. Normal S1 and S2. No murmur.     Abdomen:  Soft, non-tender and non-distended. Normal bowel sounds. No masses.  Extremities:  Moves all extremities well. No edema. PP+              Skin: No Rash.           Neuro: Moves all 4 extremities and cranial nerves grossly intact.  Psychiatric: Normal mood and affect.      IMPRESSION AND RECOMMENDATIONS:  60-year-old gentleman referred to the sleep clinic by his PCP for witnessed episode of apnea in a patient with risk factor for UMU.  Patient Edward a sleepiness score is no significant elevated but sometimes there is no correlation of the score with the symptoms and results of the polysomnogram  Patient with diagnosis of COPD referred to pulmonologist pending appointment, UMU/COPD overlapping syndrome?    1. Witnessed episode of apnea as per wife information  - Polysomnography 4 or More Parameters; Future    2. Sleep disturbances  - Polysomnography 4 or More Parameters; Future    3. UMU (obstructive sleep apnea)Suspected  - Polysomnography 4 or More Parameters; Future    4.  Heavy smoking history 2 pack/day for 20 years, 1 pack/day for 22 years, close to 60 pack/year smoking history  -Patient is strongly advised to quit smoking, he has been doing nicotine patch for a year at this time    5.  COPD on treatment with Breo Ellipta and albuterol HFA  - Patient has been referred to  pulmonology as per PCP  - Patient was advised to follow-up on that and as well to follow-up on chest images for his spots in lungs?  - We will defer this to his PCP patient is aware this is asleep only visit    Contraindications to home sleep test: Moderate or severe COPD    FOLLOW UP:  Patient was instructed to call with any question or concern otherwise we will see him back in 4 weeks with polysomnogram results  Return in about 4 weeks (around 4/8/2021) for Follow up after study.      Thank you very much for letting us to participate in the care of your patient.      Ramon Solorio MD, FACP, FCCP  Diplomate in Pulmonary & Sleep Medicine    This document has been electronically signed by Ramon Solorio MD on March 11, 2021 16:53 CST       Instructions provided as documented in the After Visit Summary.   The patient indicated understanding of the diagnosis and agreed with the plan of care        CC: Arabella Luu, APRN          No ref. provider found

## 2021-03-18 ENCOUNTER — HOSPITAL ENCOUNTER (OUTPATIENT)
Dept: SLEEP MEDICINE | Facility: HOSPITAL | Age: 61
Discharge: HOME | End: 2021-03-18
Admitting: INTERNAL MEDICINE

## 2021-03-18 DIAGNOSIS — G47.33 OSA (OBSTRUCTIVE SLEEP APNEA): ICD-10-CM

## 2021-03-18 DIAGNOSIS — G47.9 SLEEP DISTURBANCES: ICD-10-CM

## 2021-03-18 DIAGNOSIS — R06.81 WITNESSED EPISODE OF APNEA: ICD-10-CM

## 2021-03-18 PROCEDURE — 95810 POLYSOM 6/> YRS 4/> PARAM: CPT

## 2021-03-18 PROCEDURE — 95810 POLYSOM 6/> YRS 4/> PARAM: CPT | Performed by: INTERNAL MEDICINE

## 2021-03-26 ENCOUNTER — APPOINTMENT (OUTPATIENT)
Dept: NUCLEAR MEDICINE | Facility: HOSPITAL | Age: 61
End: 2021-03-26

## 2021-03-26 ENCOUNTER — HOSPITAL ENCOUNTER (OUTPATIENT)
Dept: CARDIOLOGY | Facility: HOSPITAL | Age: 61
End: 2021-03-26

## 2021-04-13 DIAGNOSIS — G47.33 OSA (OBSTRUCTIVE SLEEP APNEA): Primary | ICD-10-CM

## 2021-04-16 ENCOUNTER — OFFICE VISIT (OUTPATIENT)
Dept: PULMONOLOGY | Facility: CLINIC | Age: 61
End: 2021-04-16

## 2021-04-16 VITALS
HEART RATE: 92 BPM | DIASTOLIC BLOOD PRESSURE: 84 MMHG | OXYGEN SATURATION: 91 % | WEIGHT: 192.4 LBS | SYSTOLIC BLOOD PRESSURE: 172 MMHG | HEIGHT: 69 IN | BODY MASS INDEX: 28.5 KG/M2

## 2021-04-16 DIAGNOSIS — J43.1 PANLOBULAR EMPHYSEMA (HCC): Primary | ICD-10-CM

## 2021-04-16 DIAGNOSIS — Z72.0 TOBACCO USE: ICD-10-CM

## 2021-04-16 PROCEDURE — 99203 OFFICE O/P NEW LOW 30 MIN: CPT | Performed by: INTERNAL MEDICINE

## 2021-04-16 RX ORDER — FLUTICASONE PROPIONATE 50 MCG
SPRAY, SUSPENSION (ML) NASAL EVERY 24 HOURS
COMMUNITY

## 2021-04-16 RX ORDER — ALBUTEROL SULFATE 90 UG/1
AEROSOL, METERED RESPIRATORY (INHALATION)
COMMUNITY
End: 2021-10-18 | Stop reason: SDUPTHER

## 2021-04-16 NOTE — PROGRESS NOTES
Pulmonary Consultation    Arabella Luu APRN,    Thank you for asking me to see Jeff Werner for   Chief Complaint   Patient presents with   • COPD     chief complaint   .      History of Present Illness  Jeff Werner is a 60 y.o. male with a PMH significant for COPD.  He has been on various inhalers for a couple of years but has never had a pulmonary function study has never seen a pulmonologist before.      Tobacco use history:  He has been a smoker since age 20 of 1 to 2 packs a day.  He has about 60 pack years of smoking.  He uses a nicotine patch and continues to smoke on top of it.      Review of Systems: History obtained from chart review and the patient.  Review of Systems   Constitutional: Negative for appetite change, chills, diaphoresis, fatigue, fever and unexpected weight change.   HENT: Negative for congestion, dental problem, drooling and ear discharge.    Eyes: Negative for photophobia, redness and visual disturbance.   Respiratory: Positive for cough and shortness of breath. Negative for apnea, choking, chest tightness, wheezing and stridor.    Cardiovascular: Positive for chest pain. Negative for palpitations and leg swelling.   Gastrointestinal: Positive for abdominal distention. Negative for abdominal pain, anal bleeding and blood in stool.   Endocrine: Negative for polydipsia, polyphagia and polyuria.   Genitourinary: Negative for difficulty urinating, dysuria and enuresis.   Musculoskeletal: Negative for arthralgias, back pain and gait problem.   Skin: Negative for color change and rash.   Neurological: Negative for tremors, syncope and weakness.   Hematological: Negative for adenopathy. Does not bruise/bleed easily.   Psychiatric/Behavioral: Negative for agitation, behavioral problems, confusion and decreased concentration.     As described in the HPI. Otherwise, remainder of ROS (14 systems) were negative.    Patient Active Problem List   Diagnosis   • Right knee pain   •  Degenerative disc disease, lumbar   • Internal derangement of right knee   • Chronic midline low back pain without sciatica   • Mechanical pain of right knee   • Numbness and tingling of left leg   • COPD exacerbation (CMS/[facility])   • Acute respiratory failure with hypoxia (CMS/[facility])   • Hypertension   • Elevated cholesterol   • Tobacco abuse   • Hypokalemia   • Type A influenza   • Witnessed episode of apnea   • Sleep disturbances   • UMU (obstructive sleep apnea)Suspected         Current Outpatient Medications:   •  acetaminophen (TYLENOL) 500 MG tablet, Take 500 mg by mouth Every 6 (Six) Hours As Needed for Mild Pain ., Disp: , Rfl:   •  albuterol (PROVENTIL HFA;VENTOLIN HFA) 108 (90 Base) MCG/ACT inhaler, Inhale 2 puffs Every 4 (Four) Hours As Needed for Wheezing., Disp: , Rfl:   •  albuterol sulfate HFA (Ventolin HFA) 108 (90 Base) MCG/ACT inhaler, Every 4 (Four) Hours., Disp: , Rfl:   •  cetirizine (zyrTEC) 10 MG tablet, Take 10 mg by mouth Daily., Disp: , Rfl:   •  dilTIAZem CD (CARDIZEM CD) 120 MG 24 hr capsule, Take 120 mg by mouth Daily., Disp: , Rfl:   •  fluticasone (FLONASE) 50 MCG/ACT nasal spray, Daily., Disp: , Rfl:   •  Fluticasone Furoate-Vilanterol (Breo Ellipta) 100-25 MCG/INH inhaler, Daily., Disp: , Rfl:   •  guaiFENesin (MUCINEX) 600 MG 12 hr tablet, Take 1 tablet by mouth Every 12 (Twelve) Hours., Disp: , Rfl:   •  varenicline (CHANTIX JESUS) 0.5 MG X 11 & 1 MG X 42 tablet, Use as directed on package instructions, try to quit smoking after 1 week, Disp: 53 tablet, Rfl: 5    Allergies   Allergen Reactions   • Bupropion Anaphylaxis   • Carvedilol Anaphylaxis   • Losartan Anaphylaxis   • Lisinopril Angioedema   • Pravastatin Sodium GI Intolerance       Past Medical History:   Diagnosis Date   • Arthritis of back    • Asthma    • COPD (chronic obstructive pulmonary disease) (CMS/[facility])    • Degenerative disc disease, lumbar    • Elevated cholesterol    • Hypertension    • Lumbosacral disc disease      "history of disc hernations in the lumbar spine     Past Surgical History:   Procedure Laterality Date   • COLONOSCOPY N/A 9/26/2018    Procedure: COLONOSCOPY;  Surgeon: Jovanny Landis DO;  Location: [facility] ENDOSCOPY;  Service: Gastroenterology     Social History     Socioeconomic History   • Marital status:      Spouse name: Not on file   • Number of children: Not on file   • Years of education: Not on file   • Highest education level: Not on file   Tobacco Use   • Smoking status: Current Every Day Smoker     Packs/day: 0.50     Years: 40.00     Pack years: 20.00   • Smokeless tobacco: Never Used   Substance and Sexual Activity   • Alcohol use: Yes     Alcohol/week: 2.0 - 4.0 standard drinks     Types: 2 - 4 Cans of beer per week     Comment: daily   • Drug use: Defer   • Sexual activity: Defer     Family History   Problem Relation Age of Onset   • Cancer Mother    • COPD Father    • Lung disease Father    • Diabetes Sister    • Stroke Sister           Objective     Blood pressure 172/84, pulse 92, height 175.3 cm (69.02\"), weight 87.3 kg (192 lb 6.4 oz), SpO2 91 %.  Physical Exam  Vitals reviewed.   Constitutional:       General: He is not in acute distress.     Appearance: He is not diaphoretic.      Comments: Disheveled   HENT:      Head: Atraumatic.      Nose: Nose normal.      Mouth/Throat:      Pharynx: No oropharyngeal exudate.      Comments: Edentulous  Eyes:      General: No scleral icterus.  Neck:      Comments: Long scraggly beard with heavy nicotine staining  Cardiovascular:      Rate and Rhythm: Regular rhythm.      Heart sounds: No murmur heard.   No gallop.       Comments: Distant tones  Pulmonary:      Effort: No respiratory distress.      Breath sounds: No stridor. No wheezing or rhonchi.      Comments: Poor excursion  Abdominal:      General: There is distension.      Palpations: There is no mass.      Tenderness: There is no abdominal tenderness.      Hernia: No hernia is present.   "   Musculoskeletal:         General: No swelling or deformity.      Cervical back: Normal range of motion. No rigidity.   Lymphadenopathy:      Cervical: No cervical adenopathy.   Skin:     General: Skin is warm.      Findings: No rash.   Neurological:      General: No focal deficit present.      Mental Status: He is alert and oriented to person, place, and time.      Cranial Nerves: No cranial nerve deficit.   Psychiatric:         Mood and Affect: Mood normal.         Thought Content: Thought content normal.         Judgment: Judgment normal.         PFTs: He has never had pulmonary function studies.  I have ordered these to be done prior to his follow-up visit with us.    Radiology: I reviewed a CT scan from 1/29/2020 which showed panlobular emphysema       Assessment/Plan     Diagnoses and all orders for this visit:    1. Panlobular emphysema (CMS/HCC) (Primary)  -     Full Pulmonary Function Test With Bronchodilator; Future  -     varenicline (CHANTIX JESUS) 0.5 MG X 11 & 1 MG X 42 tablet; Use as directed on package instructions, try to quit smoking after 1 week  Dispense: 53 tablet; Refill: 5  -     Pneumococcal Polysaccharide Vaccine 23-Valent (PPSV23) Greater Than or Equal To 1yo Subcutaneous / IM    2. Tobacco use         Discussion/ Recommendations:   1.  Emphysema: I do not feel that inhalers have any role to play until we can get the patient off of cigarettes.  I recommended he stop his inhaled medicines until he decides to quit smoking.  He has never tried Chantix before and I prescribed this for him.  I have ordered pulmonary function studies and we will see him back again after the above.    I have also ordered a pneumococcal vaccine as he had never had one of those.    He told me he is in line to get his Covid vaccines but has not received those yet.    2.  Hypertension: His blood pressure today was 172/80.  I encouraged him to follow-up with his primary care provider concerning this.    3.  History of  hyperlipidemia    4.  Severe smoking addiction: He has at least sixty-pack-year still actively smoking now    5.  History of marijuana smoking: He Jamal has been three or 4 months since he did this.  I believe this is even more dangerous than his cigarette smoking.    Patient's Body mass index is 28.4 kg/m². BMI is within normal parameters. No follow-up required..           Return in about 4 weeks (around 5/14/2021).      Thank you for allowing me to participate in the care of Jeff Werner. Please do not hesitate to contact me with any questions.         This document has been electronically signed by Rubén Pastor MD on April 16, 2021 08:40 CDT

## 2021-04-26 ENCOUNTER — APPOINTMENT (OUTPATIENT)
Dept: VACCINE CLINIC | Facility: HOSPITAL | Age: 61
End: 2021-04-26

## 2021-05-17 ENCOUNTER — APPOINTMENT (OUTPATIENT)
Dept: VACCINE CLINIC | Facility: HOSPITAL | Age: 61
End: 2021-05-17

## 2021-06-15 ENCOUNTER — HOSPITAL ENCOUNTER (OUTPATIENT)
Dept: SLEEP MEDICINE | Facility: HOSPITAL | Age: 61
Discharge: HOME | End: 2021-06-15
Admitting: INTERNAL MEDICINE

## 2021-06-15 DIAGNOSIS — G47.33 OSA (OBSTRUCTIVE SLEEP APNEA): ICD-10-CM

## 2021-06-15 PROCEDURE — 95800 SLP STDY UNATTENDED: CPT

## 2021-06-15 PROCEDURE — 95800 SLP STDY UNATTENDED: CPT | Performed by: INTERNAL MEDICINE

## 2021-06-22 ENCOUNTER — OFFICE VISIT (OUTPATIENT)
Dept: PULMONOLOGY | Facility: CLINIC | Age: 61
End: 2021-06-22

## 2021-06-22 ENCOUNTER — PROCEDURE VISIT (OUTPATIENT)
Dept: PULMONOLOGY | Facility: CLINIC | Age: 61
End: 2021-06-22

## 2021-06-22 VITALS
HEIGHT: 69 IN | BODY MASS INDEX: 29.36 KG/M2 | OXYGEN SATURATION: 97 % | HEART RATE: 104 BPM | WEIGHT: 198.2 LBS | SYSTOLIC BLOOD PRESSURE: 154 MMHG | DIASTOLIC BLOOD PRESSURE: 86 MMHG

## 2021-06-22 DIAGNOSIS — J43.1 PANLOBULAR EMPHYSEMA (HCC): ICD-10-CM

## 2021-06-22 DIAGNOSIS — J43.2 CENTRILOBULAR EMPHYSEMA (HCC): Primary | ICD-10-CM

## 2021-06-22 PROCEDURE — 94060 EVALUATION OF WHEEZING: CPT | Performed by: INTERNAL MEDICINE

## 2021-06-22 PROCEDURE — 94729 DIFFUSING CAPACITY: CPT | Performed by: INTERNAL MEDICINE

## 2021-06-22 PROCEDURE — 94727 GAS DIL/WSHOT DETER LNG VOL: CPT | Performed by: INTERNAL MEDICINE

## 2021-06-22 PROCEDURE — 99213 OFFICE O/P EST LOW 20 MIN: CPT | Performed by: INTERNAL MEDICINE

## 2021-06-22 RX ORDER — HYDROCHLOROTHIAZIDE 12.5 MG/1
25 TABLET ORAL DAILY
COMMUNITY
Start: 2021-05-29 | End: 2021-06-28

## 2021-06-22 NOTE — PROGRESS NOTES
Full PFT with bronchodilator performed.     Good patient effort and cooperation. Prolonged exhalation time. Pre spirometry: 8-9 seconds, post spirometry: 9 seconds. No plateau reached with either.     4 puffs Albuterol; post spirometry performed 10-15 mins after bronchodilator given.     Ordered by Dr. Pastor, read by Dr. Tiwari.

## 2021-06-22 NOTE — PROGRESS NOTES
Pulmonary Office Follow-up    Subjective     Jeff Werner is seen today at the office for   Chief Complaint   Patient presents with   • Follow-up   • Emphysema         HPI  Jeff Werner is a 60 y.o. male   Patient here for follow up. He does report his breathing is definitely worse lately  He has had notable weight gain in the last month, he thinks about 20lbs, does report his Diltiazem was increased  He is currently on Breo daily. Thinks he's been on that for almost 2 years. He has a rescue inhaler    He is back on Chantix, this is his second week. He is down to 6-8 a day    Tobacco use history:  Type: cigarettes  Amount: 1-2 ppd  Duration: 40 years  Cessation: n/a   Willing to quit: No      Patient Active Problem List   Diagnosis   • Right knee pain   • Degenerative disc disease, lumbar   • Internal derangement of right knee   • Chronic midline low back pain without sciatica   • Mechanical pain of right knee   • Numbness and tingling of left leg   • COPD exacerbation (CMS/HCC)   • Acute respiratory failure with hypoxia (CMS/HCC)   • Hypertension   • Elevated cholesterol   • Tobacco abuse   • Hypokalemia   • Type A influenza   • Witnessed episode of apnea   • Sleep disturbances   • UMU (obstructive sleep apnea)Suspected         Medications, Allergies, Social, and Family Histories reviewed as per EMR.    Objective     Vitals:    06/22/21 1603   BP: 154/86   Pulse: 104   SpO2: 97%         06/22/21  1603   Weight: 89.9 kg (198 lb 3.2 oz)     [unfilled]  Physical Exam  Vitals reviewed.   Constitutional:       Appearance: Normal appearance.   HENT:      Head: Normocephalic and atraumatic.      Right Ear: Tympanic membrane normal.      Left Ear: Tympanic membrane normal.      Nose: Nose normal.      Mouth/Throat:      Mouth: Mucous membranes are moist.      Pharynx: Oropharynx is clear.   Eyes:      Conjunctiva/sclera: Conjunctivae normal.      Pupils: Pupils are equal, round, and reactive to light.    Cardiovascular:      Rate and Rhythm: Normal rate and regular rhythm.      Pulses: Normal pulses.      Heart sounds: Normal heart sounds.   Pulmonary:      Effort: Pulmonary effort is normal.      Breath sounds: Normal breath sounds.   Abdominal:      General: Abdomen is flat. Bowel sounds are normal.      Palpations: Abdomen is soft.   Musculoskeletal:         General: Normal range of motion.      Cervical back: Normal range of motion and neck supple.   Skin:     General: Skin is warm and dry.   Neurological:      General: No focal deficit present.      Mental Status: He is alert and oriented to person, place, and time.   Psychiatric:         Mood and Affect: Mood normal.         Behavior: Behavior normal.         CT angio chest 1/29/21  FINDINGS: There is no evidence of pulmonary embolism. There are  no findings to suggest aortic dissection. There are no enlarged  mediastinal or hilar lymph nodes. The visualized portions of the  upper abdominal structures are unremarkable. Moderate  emphysematous changes are noted in the lungs. A few mild  scattered foci of subsegmental atelectasis and scarring are  noted. There is a solid right upper lobe nodule, series 4, image  85 measuring 2.5 x 6.3 mm. The lungs are otherwise clear. There  is no pneumothorax or pleural effusion.     IMPRESSION:  1. No evidence of pulmonary embolism.  2. Emphysematous changes.  3. Incidentally noted right upper lobe micronodule for which  six-month follow-up chest CT is recommended.      PFTs 6/22/21  FVC 2.31L, 50%  FEV1 1.15L, 32%  Ratio 50%  TLV 6.27L, 92%  %  DLCO 47%    Assessment/Plan     Diagnoses and all orders for this visit:    1. Centrilobular emphysema (CMS/HCC) (Primary)         Discussion/ Recommendations:   Patient with very severe obstruction on his PFTs. He is trying to quit smoking and is actively cutting back. I think we need to step up his treatment. Will try him on Trelegy but I'm afraid he may need to be on a more  nebulized regimen as his FEV1 is so low, he may not have the inspiratory capacity to use the inhalers properly. Will see how he respondes clinically.     I reviewed his Chest Ct. Per Flesichner criteria, he needs an optional 1 year follow up CT which I do recommend    -Trelegy once a day  -albuterol inhlaer and nebs prn  -continue with smoking cessiton efforts  -CT chest Jan 2022    No follow-ups on file.          This document has been electronically signed by Hanna Tiwari DO on June 22, 2021 16:16 CDT

## 2021-06-28 ENCOUNTER — OFFICE VISIT (OUTPATIENT)
Dept: SLEEP MEDICINE | Facility: HOSPITAL | Age: 61
End: 2021-06-28

## 2021-06-28 ENCOUNTER — PRIOR AUTHORIZATION (OUTPATIENT)
Dept: PULMONOLOGY | Facility: CLINIC | Age: 61
End: 2021-06-28

## 2021-06-28 VITALS
SYSTOLIC BLOOD PRESSURE: 143 MMHG | BODY MASS INDEX: 28.75 KG/M2 | DIASTOLIC BLOOD PRESSURE: 84 MMHG | WEIGHT: 194.1 LBS | HEART RATE: 96 BPM | HEIGHT: 69 IN | OXYGEN SATURATION: 94 %

## 2021-06-28 DIAGNOSIS — J44.9 COPD, SEVERE (HCC): ICD-10-CM

## 2021-06-28 DIAGNOSIS — Z72.0 TOBACCO ABUSE: ICD-10-CM

## 2021-06-28 DIAGNOSIS — G47.33 OSA (OBSTRUCTIVE SLEEP APNEA): Primary | ICD-10-CM

## 2021-06-28 PROCEDURE — 99213 OFFICE O/P EST LOW 20 MIN: CPT | Performed by: INTERNAL MEDICINE

## 2021-06-28 NOTE — PROGRESS NOTES
[address] [address]  Phone: [phone]  Fax: [phone]      New Patient Sleep Medicine Follow Up  Sleep Visit Only    Encounter Date: 6/28/2021         Patient's PCP: Arabella Luu APRN    CHIEF COMPLAINT:  :   Chief Complaint   Patient presents with   • Follow-up     study results     HISTORY OF PRESENT ILLNESS:  07/28/2021  Patient came as a follow-up visit for his diagnosis of UMU, patient had an in lab polysomnogram unable to diagnose UMU due to lack of enough sleep time, subsequently the patient had a home sleep study compatible with mild to moderate obstructive sleep apnea with RDI 7.7 events per hour that was worse in supine position with RDI 18.5 events per hour with evidence of nocturnal oxygen desaturation, patient spent most of the time in supine position, patient seen by pulmonologist and he had a pulmonary function test compatible with severe COPD, his medication has been changed, patient reports daytime fatigue and sleepiness and lower and disruptive snoring.  Patient has not had any recent hospitalizations since last time he was seen by me.  Patient slept on supine position 93% of the time.  Patient reports no fever, no chills, no chest pain, no palpitation, he has a smoker's cough and dyspnea exertion, no vomiting, no seizure, no syncope.    06/28/2021  He continues to smoke. Smoking history: smoked 2 PPD for 20 years, 1 PPD for 42 Years, now cutting down to less than a half a pack  Patient  Medical history is pertinent for hypertension, COPD, chronic back pain syndrome, DJD, among others condition, patient has albuterol as a rescue inhaler and he is on Brio Ellipta 100--25 mcg.    Nicotine vaping: No  No POT smoker: Not in a Long time    Occupation:  Off Work  No significant Occupational exposure History    PAST MEDICAL AND SURGICAL HISTORIES:    Past Medical History:   Diagnosis Date   • Arthritis of back    • Asthma     • COPD (chronic obstructive pulmonary disease) (CMS/[facility])    • Degenerative disc disease, lumbar    • Elevated cholesterol    • Hypertension    • Lumbosacral disc disease     history of disc hernations in the lumbar spine     Past Surgical History:   Procedure Laterality Date   • COLONOSCOPY N/A 9/26/2018    Procedure: COLONOSCOPY;  Surgeon: Jovanny Landis DO;  Location: [facility] ENDOSCOPY;  Service: Gastroenterology       Allergies   Allergen Reactions   • Bupropion Anaphylaxis   • Carvedilol Anaphylaxis   • Losartan Anaphylaxis   • Lisinopril Angioedema   • Pravastatin Sodium GI Intolerance         Family History   Problem Relation Age of Onset   • Cancer Mother    • COPD Father    • Lung disease Father    • Diabetes Sister    • Stroke Sister      Social History     Socioeconomic History   • Marital status:      Spouse name: Not on file   • Number of children: Not on file   • Years of education: Not on file   • Highest education level: Not on file   Tobacco Use   • Smoking status: Current Every Day Smoker     Packs/day: 0.50     Years: 40.00     Pack years: 20.00   • Smokeless tobacco: Never Used   Substance and Sexual Activity   • Alcohol use: Yes     Alcohol/week: 2.0 - 4.0 standard drinks     Types: 2 - 4 Cans of beer per week     Comment: daily   • Drug use: Defer   • Sexual activity: Defer       MEDICATIONS:    Current Outpatient Medications:   •  acetaminophen (TYLENOL) 500 MG tablet, Take 500 mg by mouth Every 6 (Six) Hours As Needed for Mild Pain ., Disp: , Rfl:   •  albuterol (PROVENTIL HFA;VENTOLIN HFA) 108 (90 Base) MCG/ACT inhaler, Inhale 2 puffs Every 4 (Four) Hours As Needed for Wheezing., Disp: , Rfl:   •  albuterol sulfate HFA (Ventolin HFA) 108 (90 Base) MCG/ACT inhaler, Every 4 (Four) Hours., Disp: , Rfl:   •  cetirizine (zyrTEC) 10 MG tablet, Take 10 mg by mouth Daily., Disp: , Rfl:   •  dilTIAZem CD (CARDIZEM CD) 120 MG 24 hr capsule, Take 180 mg by mouth Daily., Disp: , Rfl:   •   "fluticasone (FLONASE) 50 MCG/ACT nasal spray, Daily., Disp: , Rfl:   •  Fluticasone-Umeclidin-Vilant (TRELEGY) 100-62.5-25 MCG/INH inhaler, Inhale 1 puff Daily., Disp: 1 each, Rfl: 11  •  varenicline (CHANTIX JESUS) 0.5 MG X 11 & 1 MG X 42 tablet, Use as directed on package instructions, try to quit smoking after 1 week, Disp: 53 tablet, Rfl: 5  •  guaiFENesin (MUCINEX) 600 MG 12 hr tablet, Take 1 tablet by mouth Every 12 (Twelve) Hours., Disp: , Rfl:     Review of the system:  As per history of present illness    OBJECTIVE:    Vitals:    06/28/21 0804   BP: 143/84   Pulse: 96   SpO2: 94%         06/28/21 0804   Weight: 88 kg (194 lb 1.6 oz)     Body mass index is 28.65 kg/m². Patient's Body mass index is 28.65 kg/m².     Neck circumference: 16\"  [place]: 4    PHYSICAL EXAM:  Physical Exam          General: Alert. Cooperative. Well developed. No acute distress.             Head:  Normocephalic. Symmetrical. Atraumatic.                Extremities:  Moves all extremities well. No edema. PP+              Skin: No Jaundice           Neuro: Moves all 4 extremities and cranial nerves grossly intact.  Psychiatric: Normal mood and affect.    Results: 6/16/2021  Impression:  1. Mild to moderate obs tructive sleep apnea with pRDI 7.7 events per hour, worse in supine position with pRDI 18.5 events per hour (Spent 93% Supine position)  2. Nocturnal oxygen desaturation (overlap syndrome)  3. Patient slept most of the time on supine position 93%  4. Home sleep study sometime may underestimate severity of UMU      IMPRESSION AND RECOMMENDATIONS:      1.  Mild to moderate UMU with DIANA on supine position 18.5 events per hour, Overall RDI 7.7 (Patient slept 93% Time in Supine Position), significant nocturnal oxygen desaturation  - Patient with cardiac and other multiple others risks and symptoms  - Patient reluctant for in lab CPAP titration study  - We will prescribe AutoPap at a pressure between 6-15 cm WP with heated humidifier and " mask to fit  - Once a stable on CPAP if patient is able to tolerate we will request overnight oximetry on CPAP    -Sleep apnea implications such as including but not limited to: Uncontrolled hypertension, sudden death, massive heart attack, stroke risk, sleep deprived motor vehicle accident, even death has been discussed and patient verbalized understanding.  Despite the denial of his sleepiness behind the wheel, we reviewed the dangers of drowsy driving.  Patient should not get behind the wheel while drowsy, Any drowsiness that occurs while behind the wheel should be handled by pulling over to rest, taking a short break, switching drivers, if possible having a small nap in the car, drinking coffee, walking around.  For long distance recommend 10-minute break every 2 hours of driving.  Patient has been instructed not to drive, operate heavy machinery or make any important decisions while sleepy.    Printed material regarding CPAP desensitization as well as drowsy driving tips has been provided    2. Witnessed episode of apnea as per wife information    3.  UMU and COPD overlapping syndrome with significant oxygen desaturation  Once  stable on CPAP he will need overnight oximetry    4.  Heavy smoking history 2 pack/day for 20 years, 1 pack/day for 22 years, close to 60 pack/year smoking history  -Patient is strongly advised to quit smoking, he has been doing nicotine patch for a year at this time    5.  Severe COPD, predicted FEV1 32%  Recently seen by pulmonologist  Patient has been prescribed Trelegy Ellipta      FOLLOW UP:  Patient was instructed to call with any question or concern otherwise we will see him back in 4 weeks with polysomnogram results  Return in about 3 months (around 9/28/2021).      Thank you very much for letting us to participate in the care of your patient.      Ramon Solorio MD, FACP, FCCP  Diplomate in Pulmonary & Sleep Medicine    This document has been electronically signed by  Ramon Solorio MD on June 28, 2021 08:37 CDT       Instructions provided as documented in the After Visit Summary.   The patient indicated understanding of the diagnosis and agreed with the plan of care        CC: Arabella Luu, APRN          No ref. provider found

## 2021-06-30 ENCOUNTER — DOCUMENTATION (OUTPATIENT)
Dept: PULMONOLOGY | Facility: CLINIC | Age: 61
End: 2021-06-30

## 2021-06-30 NOTE — PROGRESS NOTES
Patient Jonathan PA was denied.  Per Dr. Tiwari, a rx for Spiriva 2.5 mcg, 2 puffs daily and a refill on patients Breo have been sent to Dr. Tiwari to sign.

## 2021-07-29 ENCOUNTER — PRIOR AUTHORIZATION (OUTPATIENT)
Dept: PULMONOLOGY | Facility: CLINIC | Age: 61
End: 2021-07-29

## 2021-08-13 ENCOUNTER — PRIOR AUTHORIZATION (OUTPATIENT)
Dept: CARDIOLOGY | Facility: CLINIC | Age: 61
End: 2021-08-13

## 2021-08-16 ENCOUNTER — PRIOR AUTHORIZATION (OUTPATIENT)
Dept: CARDIOLOGY | Facility: CLINIC | Age: 61
End: 2021-08-16

## 2021-08-23 ENCOUNTER — OFFICE VISIT (OUTPATIENT)
Dept: PULMONOLOGY | Facility: CLINIC | Age: 61
End: 2021-08-23

## 2021-08-23 VITALS
BODY MASS INDEX: 29.18 KG/M2 | WEIGHT: 197 LBS | DIASTOLIC BLOOD PRESSURE: 80 MMHG | SYSTOLIC BLOOD PRESSURE: 156 MMHG | TEMPERATURE: 96.8 F | OXYGEN SATURATION: 96 % | HEART RATE: 126 BPM | HEIGHT: 69 IN

## 2021-08-23 DIAGNOSIS — F17.209 NICOTINE DEPENDENCE WITH NICOTINE-INDUCED DISORDER, UNSPECIFIED NICOTINE PRODUCT TYPE: ICD-10-CM

## 2021-08-23 DIAGNOSIS — J44.9 COPD, SEVERE (HCC): Primary | ICD-10-CM

## 2021-08-23 DIAGNOSIS — G47.33 OSA (OBSTRUCTIVE SLEEP APNEA): ICD-10-CM

## 2021-08-23 PROCEDURE — 99214 OFFICE O/P EST MOD 30 MIN: CPT | Performed by: INTERNAL MEDICINE

## 2021-08-23 RX ORDER — TIOTROPIUM BROMIDE INHALATION SPRAY 3.12 UG/1
2 SPRAY, METERED RESPIRATORY (INHALATION)
COMMUNITY
End: 2022-01-10 | Stop reason: SDUPTHER

## 2021-08-23 NOTE — PROGRESS NOTES
Pulmonary Office Follow-up    Subjective     Jeff Werner is seen today at the office for   Chief Complaint   Patient presents with   • Emphysema         HPI  Jeff Werner is a 61 y.o. male   Patient here to follow up. At last visit I changed him from Breo to Trelegy. His insurance woulnd't pay for that until he tried a separate LAMA first. So he got Spiriva but he didn't realize he was also supposed to be taking Breo along with the Spiriva. He notes it's been making his mouth dry.  He reports his Chantix was recalled so he's been off that for a few weeks. Has been smoking a little more     Last OV  Patient here for follow up. He does report his breathing is definitely worse lately  He has had notable weight gain in the last month, he thinks about 20lbs, does report his Diltiazem was increased  He is currently on Breo daily. Thinks he's been on that for almost 2 years. He has a rescue inhaler    He is back on Chantix, this is his second week. He is down to 6-8 a day    Tobacco use history:  Type: cigarettes  Amount: 1-2 ppd  Duration: 40 years  Cessation: n/a   Willing to quit: yes      Patient Active Problem List   Diagnosis   • Right knee pain   • Degenerative disc disease, lumbar   • Internal derangement of right knee   • Chronic midline low back pain without sciatica   • Mechanical pain of right knee   • Numbness and tingling of left leg   • COPD exacerbation (CMS/HCC)   • Acute respiratory failure with hypoxia (CMS/HCC)   • Hypertension   • Elevated cholesterol   • Tobacco abuse   • Hypokalemia   • Type A influenza   • Witnessed episode of apnea   • Sleep disturbances   • UMU (obstructive sleep apnea)Suspected   • COPD, severe (CMS/HCC)         Medications, Allergies, Social, and Family Histories reviewed as per EMR.    Objective     Vitals:    08/23/21 1536   BP: 156/80   Pulse: (!) 126   Temp: 96.8 °F (36 °C)   SpO2: 96%         08/23/21  1536   Weight: 89.4 kg (197 lb)      [unfilled]  Physical Exam  Vitals reviewed.   Constitutional:       Appearance: Normal appearance.   HENT:      Head: Normocephalic and atraumatic.      Right Ear: Tympanic membrane normal.      Left Ear: Tympanic membrane normal.      Nose: Nose normal.      Mouth/Throat:      Mouth: Mucous membranes are moist.      Pharynx: Oropharynx is clear.   Eyes:      Conjunctiva/sclera: Conjunctivae normal.      Pupils: Pupils are equal, round, and reactive to light.   Cardiovascular:      Rate and Rhythm: Normal rate and regular rhythm.      Pulses: Normal pulses.      Heart sounds: Normal heart sounds.   Pulmonary:      Effort: Pulmonary effort is normal.      Breath sounds: Normal breath sounds.   Abdominal:      General: Abdomen is flat. Bowel sounds are normal.      Palpations: Abdomen is soft.   Musculoskeletal:         General: Normal range of motion.      Cervical back: Normal range of motion and neck supple.   Skin:     General: Skin is warm and dry.   Neurological:      General: No focal deficit present.      Mental Status: He is alert and oriented to person, place, and time.   Psychiatric:         Mood and Affect: Mood normal.         Behavior: Behavior normal.         CT angio chest 1/29/21  FINDINGS: There is no evidence of pulmonary embolism. There are  no findings to suggest aortic dissection. There are no enlarged  mediastinal or hilar lymph nodes. The visualized portions of the  upper abdominal structures are unremarkable. Moderate  emphysematous changes are noted in the lungs. A few mild  scattered foci of subsegmental atelectasis and scarring are  noted. There is a solid right upper lobe nodule, series 4, image  85 measuring 2.5 x 6.3 mm. The lungs are otherwise clear. There  is no pneumothorax or pleural effusion.     IMPRESSION:  1. No evidence of pulmonary embolism.  2. Emphysematous changes.  3. Incidentally noted right upper lobe micronodule for which  six-month follow-up chest CT is  recommended.      PFTs 6/22/21  FVC 2.31L, 50%  FEV1 1.15L, 32%  Ratio 50%  TLV 6.27L, 92%  %  DLCO 47%    Assessment/Plan     Diagnoses and all orders for this visit:    1. COPD, severe (CMS/HCC) (Primary)    2. UMU (obstructive sleep apnea)Suspected    3. Nicotine dependence with nicotine-induced disorder, unspecified nicotine product type         Discussion/ Recommendations:   Patient still not on a optimal regiment. He understands now to take the Spiriva and the Breo. Recommend getting some OTC biotene or similar product for dry mouth. He will continue to work on smoking cessation. Will follow up in 3 months    -Spiriva and Breo daily  -Biotene mouth wash for dry mouth  -Flonase nightly  -albuterol inhlaer and nebs prn  -continue with smoking cessiton efforts  -CT chest Jan 2022    Return in about 3 months (around 11/23/2021).          This document has been electronically signed by Hanna Tiwari DO on August 23, 2021 15:54 CDT

## 2021-10-18 ENCOUNTER — OFFICE VISIT (OUTPATIENT)
Dept: SLEEP MEDICINE | Facility: HOSPITAL | Age: 61
End: 2021-10-18

## 2021-10-18 VITALS
BODY MASS INDEX: 29.19 KG/M2 | OXYGEN SATURATION: 94 % | SYSTOLIC BLOOD PRESSURE: 165 MMHG | HEIGHT: 69 IN | DIASTOLIC BLOOD PRESSURE: 96 MMHG | WEIGHT: 197.1 LBS | HEART RATE: 116 BPM

## 2021-10-18 DIAGNOSIS — G47.33 OBSTRUCTIVE SLEEP APNEA, ADULT: Primary | ICD-10-CM

## 2021-10-18 PROCEDURE — 99212 OFFICE O/P EST SF 10 MIN: CPT | Performed by: NURSE PRACTITIONER

## 2021-10-18 NOTE — PROGRESS NOTES
Sleep Clinic Follow Up    Date: 10/18/2021  Primary Care Provider: Arabella Luu APRN    Last office visit: 2021 (I reviewed this note)    CC: Follow up: UMU started on CPAP, 31-90 day follow-up       Interim History:  Since the last visit:    1) mild UMU -  Jeff Werner has not remained compliant with CPAP. He denies machine issues, dry mouth, headaches, or pressures intolerance. He denies abnormal dreams, sleep paralysis.   He states machine is giving him nasal congestion.  He has not worn the machine enough to tell a difference in how he feels.     2) Patient denies RLS symptoms.         Sleep Testin. HST on 2021, AHI of 5, RDI of 7.7   2. Currently on 6-15 cm H2O    PAP Data:    Time frame: 2021-10/16/2021   Compliance: 3 %  Average use on days used: 1hrs 25 min  Percent of days with usage greater than or equal to 4 hours: 0%  PAP range: 6-15 cm H2O  Average 90% pressure: 7.4 cmH2O  Leak: 64.8 L/ minutes  Average AHI: 6.7 events/hr  Mask type: Full face mask  DME: [facility]     Bed time: varies- working some overnight shifts  Sleep latency: 30-60 minutes  Number of times awakens during the night: 0  Wake time: varies   Estimated total sleep time at night: 6 hours  Caffeine intake: 2 cups of coffee, 0 cups of tea, and 0 sodas per day  Alcohol intake: 0-1 drinks per week  Nap time: some days    Sleepiness with Driving: very rare, denies close calls or accidents related to falling asleep at the wheel     [place] - 7        PMHx, FH, SH reviewed and pertinent changes are:  unchanged from last office visit on 2021      REVIEW OF SYSTEMS:   Negative for chest pain, SOA, fever, chills, cough, N/V/D, abdominal pain.    Smoking:< 1ppd    Jeff Werner  reports that he has been smoking. He has a 20.00 pack-year smoking history. He has never used smokeless tobacco.. I have educated him on the risk of diseases from using tobacco products such as cancer, COPD and heart disease.     I  strongly advised him to quit. He is using nicotine patches.            Exam:  Vitals:    10/18/21 1410   BP: 165/96   Pulse: 116   SpO2: 94%           10/18/21  1410   Weight: 89.4 kg (197 lb 1.6 oz)     Body mass index is 29.09 kg/m². Patient's Body mass index is 29.09 kg/m². indicating that he is overweight (BMI 25-29.9). Obesity-related health conditions include the following: obstructive sleep apnea. Obesity is unchanged. BMI is is above average; BMI management plan is completed. We discussed portion control and increasing exercise..      Gen:                No distress, conversant, pleasant, appears stated age, alert, oriented  Eyes:               Anicteric sclera, moist conjunctiva, no lid lag                           EOMI   Lungs:             normal effort, non-labored breathing                          Clear to auscultation bilaterally          CV:                  Normal S1/S2, no murmur                          no lower extremity edema              Psych:             Appropriate affect  Neuro:             CN 2-12 appear intact    Past Medical History:   Diagnosis Date   • Arthritis of back    • Asthma    • COPD (chronic obstructive pulmonary disease) (HCC)    • Degenerative disc disease, lumbar    • Elevated cholesterol    • Hypertension    • Lumbosacral disc disease     history of disc hernations in the lumbar spine       Current Outpatient Medications:   •  acetaminophen (TYLENOL) 500 MG tablet, Take 500 mg by mouth Every 6 (Six) Hours As Needed for Mild Pain ., Disp: , Rfl:   •  albuterol (PROVENTIL HFA;VENTOLIN HFA) 108 (90 Base) MCG/ACT inhaler, Inhale 2 puffs Every 4 (Four) Hours As Needed for Wheezing., Disp: , Rfl:   •  cetirizine (zyrTEC) 10 MG tablet, Take 10 mg by mouth Daily., Disp: , Rfl:   •  dilTIAZem CD (CARDIZEM CD) 120 MG 24 hr capsule, Take 180 mg by mouth Daily., Disp: , Rfl:   •  fluticasone (FLONASE) 50 MCG/ACT nasal spray, Daily., Disp: , Rfl:   •  Fluticasone Furoate-Vilanterol  "(BREO ELLIPTA) 100-25 MCG/INH inhaler, Inhale 1 puff Daily., Disp: 1 each, Rfl: 5  •  tiotropium bromide monohydrate (Spiriva Respimat) 2.5 MCG/ACT aerosol solution inhaler, Inhale 2 puffs Daily., Disp: , Rfl:   •  tiotropium bromide-olodaterol (STIOLTO RESPIMAT) 2.5-2.5 MCG/ACT aerosol solution inhaler, Inhale 2 puffs Daily., Disp: 1 each, Rfl: 5      Assessment and Plan:    1. Mild obstructive sleep apnea    1. Not compliant with PAP therapy- encouraged increasing compliance. Health risks involved with non compliance. Benefit of wearing CPAP for UMU as well as COPD. Discussed possibility of repossession of machine by DME. Discussed positional therapies such as wedge pillow and tennis-ball t-shirt as alternative treatment options  2. Continue PAP as prescribed  3. Flonase as directed for nasal congestion   4. Increase humidifier settings   5. Good sleep hygiene   6. Script for PAP supplies  7. Drowsy driving tips- do not drive if feeling sleepy   8. Return to clinic in 4 weeks with compliance report unless change in symptoms in interim period  6. Poor sleep hygiene   8. Nicotine dependence with complication   1. Given [employer] \"Thinking of quitting smoking\" flyer and referred patient to call [phone].         9. COPD        I spent 18 minutes caring for Jeff on this date of service. This time includes time spent by me in the following activities: preparing for the visit, obtaining and/or reviewing a separately obtained history, performing a medically appropriate examination and/or evaluation, counseling and educating the patient/family/caregiver, ordering medications, tests, or procedures and documenting information in the medical record.           This document has been electronically signed by CRISTOBAL Reyes on October 18, 2021 14:16 CDT            CC: Arabella Luu APRN          No ref. provider found  "

## 2021-10-18 NOTE — PATIENT INSTRUCTIONS
Obesity, Adult  Obesity is the condition of having too much total body fat. Being overweight or obese means that your weight is greater than what is considered healthy for your body size. Obesity is determined by a measurement called BMI. BMI is an estimate of body fat and is calculated from height and weight. For adults, a BMI of 30 or higher is considered obese.  Obesity can lead to other health concerns and major illnesses, including:  · Stroke.  · Coronary artery disease (CAD).  · Type 2 diabetes.  · Some types of cancer, including cancers of the colon, breast, uterus, and gallbladder.  · Osteoarthritis.  · High blood pressure (hypertension).  · High cholesterol.  · Sleep apnea.  · Gallbladder stones.  · Infertility problems.  What are the causes?  Common causes of this condition include:  · Eating daily meals that are high in calories, sugar, and fat.  · Being born with genes that may make you more likely to become obese.  · Having a medical condition that causes obesity, including:  ? Hypothyroidism.  ? Polycystic ovarian syndrome (PCOS).  ? Binge-eating disorder.  ? Cushing syndrome.  · Taking certain medicines, such as steroids, antidepressants, and seizure medicines.  · Not being physically active (sedentary lifestyle).  · Not getting enough sleep.  · Drinking high amounts of sugar-sweetened beverages, such as soft drinks.  What increases the risk?  The following factors may make you more likely to develop this condition:  · Having a family history of obesity.  · Being a woman of  descent.  · Being a man of  descent.  · Living in an area with limited access to:  ? [place], recreation centers, or sidewalks.  ? Healthy food choices, such as grocery stores and farmers' markets.  What are the signs or symptoms?  The main sign of this condition is having too much body fat.  How is this diagnosed?  This condition is diagnosed based on:  · Your BMI. If you are an adult with a BMI of 30 or  higher, you are considered obese.  · Your waist circumference. This measures the distance around your waistline.  · Your skinfold thickness. Your health care provider may gently pinch a fold of your skin and measure it.  You may have other tests to check for underlying conditions.  How is this treated?  Treatment for this condition often includes changing your lifestyle. Treatment may include some or all of the following:  · Dietary changes. This may include developing a healthy meal plan.  · Regular physical activity. This may include activity that causes your heart to beat faster (aerobic exercise) and strength training. Work with your health care provider to design an exercise program that works for you.  · Medicine to help you lose weight if you are unable to lose 1 pound a week after 6 weeks of healthy eating and more physical activity.  · Treating conditions that cause the obesity (underlying conditions).  · Surgery. Surgical options may include gastric banding and gastric bypass. Surgery may be done if:  ? Other treatments have not helped to improve your condition.  ? You have a BMI of 40 or higher.  ? You have life-threatening health problems related to obesity.  Follow these instructions at home:  Eating and drinking    · Follow recommendations from your health care provider about what you eat and drink. Your health care provider may advise you to:  ? Limit fast food, sweets, and processed snack foods.  ? Choose low-fat options, such as low-fat milk instead of whole milk.  ? Eat 5 or more servings of fruits or vegetables every day.  ? Eat at home more often. This gives you more control over what you eat.  ? Choose healthy foods when you eat out.  ? Learn to read food labels. This will help you understand how much food is considered 1 serving.  ? Learn what a healthy serving size is.  ? Keep low-fat snacks available.  ? Limit sugary drinks, such as soda, fruit juice, sweetened iced tea, and flavored  milk.  · Drink enough water to keep your urine pale yellow.  · Do not follow a fad diet. Fad diets can be unhealthy and even dangerous.    Physical activity  · Exercise regularly, as told by your health care provider.  ? Most adults should get up to 150 minutes of moderate-intensity exercise every week.  ? Ask your health care provider what types of exercise are safe for you and how often you should exercise.  · Warm up and stretch before being active.  · Cool down and stretch after being active.  · Rest between periods of activity.  Lifestyle  · Work with your health care provider and a dietitian to set a weight-loss goal that is healthy and reasonable for you.  · Limit your screen time.  · Find ways to reward yourself that do not involve food.  · Do not drink alcohol if:  ? Your health care provider tells you not to drink.  ? You are pregnant, may be pregnant, or are planning to become pregnant.  · If you drink alcohol:  ? Limit how much you use to:  § 0-1 drink a day for women.  § 0-2 drinks a day for men.  ? Be aware of how much alcohol is in your drink. In the [place] one drink equals one 12 oz bottle of beer (355 mL), one 5 oz glass of wine (148 mL), or one 1½ oz glass of hard liquor (44 mL).  General instructions  · Keep a weight-loss journal to keep track of the food you eat and how much exercise you get.  · Take over-the-counter and prescription medicines only as told by your health care provider.  · Take vitamins and supplements only as told by your health care provider.  · Consider joining a support group. Your health care provider may be able to recommend a support group.  · Keep all follow-up visits as told by your health care provider. This is important.  Contact a health care provider if:  · You are unable to meet your weight loss goal after 6 weeks of dietary and lifestyle changes.  Get help right away if you are having:  · Trouble breathing.  · Suicidal thoughts or behaviors.  Summary  · Obesity is  the condition of having too much total body fat.  · Being overweight or obese means that your weight is greater than what is considered healthy for your body size.  · Work with your health care provider and a dietitian to set a weight-loss goal that is healthy and reasonable for you.  · Exercise regularly, as told by your health care provider. Ask your health care provider what types of exercise are safe for you and how often you should exercise.  This information is not intended to replace advice given to you by your health care provider. Make sure you discuss any questions you have with your health care provider.  Document Revised: 08/22/2019 Document Reviewed: 08/22/2019  Elsevier Patient Education © 2021 [employer]

## 2021-11-18 ENCOUNTER — OFFICE VISIT (OUTPATIENT)
Dept: SLEEP MEDICINE | Facility: HOSPITAL | Age: 61
End: 2021-11-18

## 2021-11-18 VITALS
HEIGHT: 69 IN | BODY MASS INDEX: 30.02 KG/M2 | SYSTOLIC BLOOD PRESSURE: 147 MMHG | OXYGEN SATURATION: 95 % | DIASTOLIC BLOOD PRESSURE: 93 MMHG | HEART RATE: 102 BPM | WEIGHT: 202.7 LBS

## 2021-11-18 DIAGNOSIS — G47.33 OBSTRUCTIVE SLEEP APNEA, ADULT: Primary | ICD-10-CM

## 2021-11-18 PROCEDURE — 99212 OFFICE O/P EST SF 10 MIN: CPT | Performed by: NURSE PRACTITIONER

## 2021-11-18 NOTE — PROGRESS NOTES
Sleep Clinic Follow Up    Date: 2021  Primary Care Provider: Arabella Luu APRN    Last office visit: 10/18/2021 (I reviewed this note)    CC: Follow up: UMU on CPAP, 1 month compliance       Interim History:  Since the last visit:    1) mild UMU -  Jeff Werner has not remained compliant with CPAP. He denies mask and machine issues, dry mouth, headaches. He denies abnormal dreams, sleep paralysis.   He reports as soon as he puts machine on his face he gets nasal congestion. He thinks pressure is too high. Most nights he does not even put the mask on. He is using Flonase twice daily. He knows he should wear machine he just does not know if he can tolerate it. He wants to keep trying.     2) Patient denies RLS symptoms.         Sleep Testin. HST on 2021, AHI of 5, RDI 7.7   2. Currently on 6-15 cm H2O    PAP Data:    Time frame: 10/19/2021-2021   Compliance: 10 %  Average use on days used: 1hrs 46 min  Percent of days with usage greater than or equal to 4 hours: 0%  PAP range: 6-15 cm H2O  Average 90% pressure: 9.6 cmH2O  Leak: 31.6 L/ minutes  Average AHI: 8.5 events/hr  Mask type: Full face mask  DME: [facility]      Bed time: varies, working some overnight shifts   Sleep latency: 30-60 minutes  Number of times awakens during the night: 0  Wake time: varies   Estimated total sleep time at night: 6 hours  Caffeine intake: 2 cups of coffee, 0 cups of tea, and 0 sodas per day  Alcohol intake: 0-1 drinks per week  Nap time: some days    Sleepiness with Driving: denies      [place] - 4        PMHx, FH, SH reviewed and pertinent changes are:  Started on atorvastatin       REVIEW OF SYSTEMS:   Negative for chest pain, SOA, fever, chills, cough, N/V/D, abdominal pain.    Smoking:< 1ppd    Jeff Werner  reports that he has been smoking. He has a 20.00 pack-year smoking history. He has never used smokeless tobacco.. I have educated him on the risk of diseases from using tobacco products such  as cancer, COPD and heart disease.     I advised him to quit and he is not willing to quit. He was on Chantix but then medicine was recalled so he stopped.                Exam:  Vitals:    11/18/21 1343   BP: 147/93   Pulse: 102   SpO2: 95%           11/18/21  1343   Weight: 91.9 kg (202 lb 11.2 oz)     Body mass index is 29.92 kg/m². Patient's Body mass index is 29.92 kg/m². indicating that he is overweight (BMI 25-29.9). Obesity-related health conditions include the following: obstructive sleep apnea. Obesity is unchanged. BMI is is above average; BMI management plan is completed. We discussed portion control and increasing exercise..      Gen:                No distress, conversant, pleasant, appears stated age, alert, oriented  Eyes:               Anicteric sclera, moist conjunctiva, no lid lag                           EOMI   Lungs:             normal effort, non-labored breathing                          Clear to auscultation bilaterally          CV:                  Normal S1/S2, no murmur                          no lower extremity edema                 Psych:             Appropriate affect  Neuro:             CN 2-12 appear intact    Past Medical History:   Diagnosis Date   • Arthritis of back    • Asthma    • COPD (chronic obstructive pulmonary disease) (HCC)    • Degenerative disc disease, lumbar    • Elevated cholesterol    • Hypertension    • Lumbosacral disc disease     history of disc hernations in the lumbar spine       Current Outpatient Medications:   •  acetaminophen (TYLENOL) 500 MG tablet, Take 500 mg by mouth Every 6 (Six) Hours As Needed for Mild Pain ., Disp: , Rfl:   •  albuterol (PROVENTIL HFA;VENTOLIN HFA) 108 (90 Base) MCG/ACT inhaler, Inhale 2 puffs Every 4 (Four) Hours As Needed for Wheezing., Disp: , Rfl:   •  cetirizine (zyrTEC) 10 MG tablet, Take 10 mg by mouth Daily., Disp: , Rfl:   •  dilTIAZem CD (CARDIZEM CD) 120 MG 24 hr capsule, Take 180 mg by mouth Daily., Disp: , Rfl:   •   fluticasone (FLONASE) 50 MCG/ACT nasal spray, Daily., Disp: , Rfl:   •  tiotropium bromide monohydrate (Spiriva Respimat) 2.5 MCG/ACT aerosol solution inhaler, Inhale 2 puffs Daily., Disp: , Rfl:   •  tiotropium bromide-olodaterol (STIOLTO RESPIMAT) 2.5-2.5 MCG/ACT aerosol solution inhaler, Inhale 2 puffs Daily., Disp: 1 each, Rfl: 5  •  Fluticasone Furoate-Vilanterol (BREO ELLIPTA) 100-25 MCG/INH inhaler, Inhale 1 puff Daily., Disp: 1 each, Rfl: 5      Assessment and Plan:    1. Mild obstructive sleep apnea  -  1. Not compliant with PAP therapy- again encouraged increasing compliance and risks involved with non-compliance. Discussed alternative therapies. He is not interested in oral appliance. Discussed positional therapy, wedge pillow, sleeping with head elevated. Discussed possibility of repossession of machine since he has not been compliant. Reach out to DME.   2. Continue PAP as prescribed, pressure change 6-10 cm H2O for comfort   3. Humidifier settings decrease to 1   4. Script for PAP supplies  5. Flonase as directed   6. Drowsy driving tips- do not drive if feeling sleepy   7. Return to clinic in 6 weeks with compliance report unless change in symptoms in interim period  2. Poor sleep hygiene   3. COPD        I spent 15 minutes caring for Edward on this date of service. This time includes time spent by me in the following activities: preparing for the visit, obtaining and/or reviewing a separately obtained history, performing a medically appropriate examination and/or evaluation, counseling and educating the patient/family/caregiver, ordering medications, tests, or procedures and documenting information in the medical record.           This document has been electronically signed by CRISTOBAL Reyes on November 18, 2021 13:59 CST            CC: Arabella Luu APRN          No ref. provider found

## 2021-12-06 ENCOUNTER — OFFICE VISIT (OUTPATIENT)
Dept: PULMONOLOGY | Facility: CLINIC | Age: 61
End: 2021-12-06

## 2021-12-06 VITALS
SYSTOLIC BLOOD PRESSURE: 144 MMHG | WEIGHT: 198 LBS | OXYGEN SATURATION: 93 % | BODY MASS INDEX: 29.33 KG/M2 | DIASTOLIC BLOOD PRESSURE: 80 MMHG | HEART RATE: 101 BPM | HEIGHT: 69 IN | TEMPERATURE: 97.7 F

## 2021-12-06 DIAGNOSIS — J44.9 COPD, SEVERE (HCC): Primary | ICD-10-CM

## 2021-12-06 DIAGNOSIS — F17.209 NICOTINE DEPENDENCE WITH NICOTINE-INDUCED DISORDER, UNSPECIFIED NICOTINE PRODUCT TYPE: ICD-10-CM

## 2021-12-06 PROCEDURE — 99214 OFFICE O/P EST MOD 30 MIN: CPT | Performed by: INTERNAL MEDICINE

## 2021-12-06 RX ORDER — PREDNISONE 10 MG/1
TABLET ORAL
Qty: 22 TABLET | Refills: 0 | Status: SHIPPED | OUTPATIENT
Start: 2021-12-06

## 2021-12-06 NOTE — PROGRESS NOTES
Pulmonary Office Follow-up    Subjective     Jeff Werner is seen today at the office for   Chief Complaint   Patient presents with   • COPD   • Emphysema         HPI  Jfef Werner is a 61 y.o. male     12/6/21  Patient here for follow up. At least visit patient was supposed to start taking Spiriva WITH Breo. Still couldn't remember that so has just been using Stiolto. Continues to smoke. Reports having more congestion and phlegm lately      OV 8/23/21  Patient here to follow up. At last visit I changed him from Breo to Trelegy. His insurance woulnd't pay for that until he tried a separate LAMA first. So he got Spiriva but he didn't realize he was also supposed to be taking Breo along with the Spiriva. He notes it's been making his mouth dry.  He reports his Chantix was recalled so he's been off that for a few weeks. Has been smoking a little more     OV 6/22/21  Patient here for follow up. He does report his breathing is definitely worse lately  He has had notable weight gain in the last month, he thinks about 20lbs, does report his Diltiazem was increased  He is currently on Breo daily. Thinks he's been on that for almost 2 years. He has a rescue inhaler    He is back on Chantix, this is his second week. He is down to 6-8 a day    Tobacco use history:  Type: cigarettes  Amount: 1-2 ppd  Duration: 40 years  Cessation: n/a   Willing to quit: yes      Patient Active Problem List   Diagnosis   • Right knee pain   • Degenerative disc disease, lumbar   • Internal derangement of right knee   • Chronic midline low back pain without sciatica   • Mechanical pain of right knee   • Numbness and tingling of left leg   • COPD exacerbation (HCC)   • Acute respiratory failure with hypoxia (HCC)   • Hypertension   • Elevated cholesterol   • Tobacco abuse   • Hypokalemia   • Type A influenza   • Witnessed episode of apnea   • Sleep disturbances   • UMU (obstructive sleep apnea)Suspected   • COPD, severe (HCC)   •  Nicotine dependence with nicotine-induced disorder         Medications, Allergies, Social, and Family Histories reviewed as per EMR.    Objective     Vitals:    12/06/21 1459   BP: 144/80   Pulse: 101   Temp: 97.7 °F (36.5 °C)   SpO2: 93%         12/06/21  1459   Weight: 89.8 kg (198 lb)     [unfilled]  Physical Exam  Vitals reviewed.   Constitutional:       Appearance: Normal appearance.   HENT:      Head: Normocephalic and atraumatic.      Right Ear: Tympanic membrane normal.      Left Ear: Tympanic membrane normal.      Nose: Nose normal.      Mouth/Throat:      Mouth: Mucous membranes are moist.      Pharynx: Oropharynx is clear.   Eyes:      Conjunctiva/sclera: Conjunctivae normal.      Pupils: Pupils are equal, round, and reactive to light.   Cardiovascular:      Rate and Rhythm: Normal rate and regular rhythm.      Pulses: Normal pulses.      Heart sounds: Normal heart sounds.   Pulmonary:      Effort: Pulmonary effort is normal.      Breath sounds: Normal breath sounds.   Abdominal:      General: Abdomen is flat. Bowel sounds are normal.      Palpations: Abdomen is soft.   Musculoskeletal:         General: Normal range of motion.      Cervical back: Normal range of motion and neck supple.   Skin:     General: Skin is warm and dry.   Neurological:      General: No focal deficit present.      Mental Status: He is alert and oriented to person, place, and time.   Psychiatric:         Mood and Affect: Mood normal.         Behavior: Behavior normal.         CT angio chest 1/29/21  FINDINGS: There is no evidence of pulmonary embolism. There are  no findings to suggest aortic dissection. There are no enlarged  mediastinal or hilar lymph nodes. The visualized portions of the  upper abdominal structures are unremarkable. Moderate  emphysematous changes are noted in the lungs. A few mild  scattered foci of subsegmental atelectasis and scarring are  noted. There is a solid right upper lobe nodule, series 4,  image  85 measuring 2.5 x 6.3 mm. The lungs are otherwise clear. There  is no pneumothorax or pleural effusion.     IMPRESSION:  1. No evidence of pulmonary embolism.  2. Emphysematous changes.  3. Incidentally noted right upper lobe micronodule for which  six-month follow-up chest CT is recommended.      PFTs 6/22/21  FVC 2.31L, 50%  FEV1 1.15L, 32%  Ratio 50%  TLV 6.27L, 92%  %  DLCO 47%    Assessment/Plan     Diagnoses and all orders for this visit:    1. COPD, severe (HCC) (Primary)    2. Nicotine dependence with nicotine-induced disorder, unspecified nicotine product type         Discussion/ Recommendations:   Patient still not on a optimal regiment. He understands now to take the Spiriva and the Breo. Due for screening Ct next month. Will follow up after that    -Spiriva and Breo daily  -Biotene mouth wash for dry mouth  -Flonase nightly  -albuterol inhlaer and nebs prn  -continue with smoking cessiton efforts  -CT chest Jan 2022    No follow-ups on file.          This document has been electronically signed by Hanna Tiwari DO on December 6, 2021 15:13 CST

## 2022-01-04 ENCOUNTER — HOSPITAL ENCOUNTER (OUTPATIENT)
Dept: CT IMAGING | Facility: HOSPITAL | Age: 62
Discharge: HOME | End: 2022-01-04
Admitting: INTERNAL MEDICINE

## 2022-01-04 DIAGNOSIS — F17.209 NICOTINE DEPENDENCE WITH NICOTINE-INDUCED DISORDER, UNSPECIFIED NICOTINE PRODUCT TYPE: ICD-10-CM

## 2022-01-04 PROCEDURE — 71271 CT THORAX LUNG CANCER SCR C-: CPT

## 2022-01-10 RX ORDER — TIOTROPIUM BROMIDE INHALATION SPRAY 3.12 UG/1
2 SPRAY, METERED RESPIRATORY (INHALATION)
Qty: 4 G | Refills: 3 | Status: SHIPPED | OUTPATIENT
Start: 2022-01-10 | End: 2022-05-06 | Stop reason: SDUPTHER

## 2022-01-17 ENCOUNTER — OFFICE VISIT (OUTPATIENT)
Dept: PULMONOLOGY | Facility: CLINIC | Age: 62
End: 2022-01-17

## 2022-01-17 VITALS
WEIGHT: 197.4 LBS | HEART RATE: 108 BPM | HEIGHT: 69 IN | SYSTOLIC BLOOD PRESSURE: 134 MMHG | BODY MASS INDEX: 29.24 KG/M2 | DIASTOLIC BLOOD PRESSURE: 80 MMHG | OXYGEN SATURATION: 95 % | TEMPERATURE: 96.8 F

## 2022-01-17 DIAGNOSIS — J44.9 COPD, SEVERE: Primary | ICD-10-CM

## 2022-01-17 DIAGNOSIS — F17.209 NICOTINE DEPENDENCE WITH NICOTINE-INDUCED DISORDER, UNSPECIFIED NICOTINE PRODUCT TYPE: ICD-10-CM

## 2022-01-17 PROBLEM — J10.1 TYPE A INFLUENZA: Status: RESOLVED | Noted: 2020-01-31 | Resolved: 2022-01-17

## 2022-01-17 PROCEDURE — 99214 OFFICE O/P EST MOD 30 MIN: CPT | Performed by: INTERNAL MEDICINE

## 2022-01-17 RX ORDER — ATORVASTATIN CALCIUM 40 MG/1
40 TABLET, FILM COATED ORAL DAILY
COMMUNITY
Start: 2022-01-06

## 2022-01-17 NOTE — PROGRESS NOTES
Pulmonary Office Follow-up    Subjective     Jeff Werner is seen today at the office for   Chief Complaint   Patient presents with   • COPD     ct done on 1/4/22         HPI  Jeff Werner is a 61 y.o. male     1/17/21  Patient here for follow up on CT scan. No concerning nodules seen. He's doing ok with his inhalers. He's bene taking Breo and Spiriva and that is doing ok. He starte don Prednisone a few days ago for increased congestion and cough    12/6/21  Patient here for follow up. At least visit patient was supposed to start taking Spiriva WITH Breo. Still couldn't remember that so has just been using Stiolto. Continues to smoke. Reports having more congestion and phlegm lately    OV 8/23/21  Patient here to follow up. At last visit I changed him from Breo to Trelegy. His insurance woulnd't pay for that until he tried a separate LAMA first. So he got Spiriva but he didn't realize he was also supposed to be taking Breo along with the Spiriva. He notes it's been making his mouth dry.  He reports his Chantix was recalled so he's been off that for a few weeks. Has been smoking a little more     OV 6/22/21  Patient here for follow up. He does report his breathing is definitely worse lately  He has had notable weight gain in the last month, he thinks about 20lbs, does report his Diltiazem was increased  He is currently on Breo daily. Thinks he's been on that for almost 2 years. He has a rescue inhaler    He is back on Chantix, this is his second week. He is down to 6-8 a day    Tobacco use history:  Type: cigarettes  Amount: 1-2 ppd  Duration: 40 years  Cessation: n/a   Willing to quit: yes      Patient Active Problem List   Diagnosis   • Right knee pain   • Degenerative disc disease, lumbar   • Internal derangement of right knee   • Chronic midline low back pain without sciatica   • Mechanical pain of right knee   • Numbness and tingling of left leg   • COPD exacerbation (HCC)   • Acute respiratory  failure with hypoxia (HCC)   • Hypertension   • Elevated cholesterol   • Tobacco abuse   • Hypokalemia   • Witnessed episode of apnea   • Sleep disturbances   • UMU (obstructive sleep apnea)Suspected   • COPD, severe (HCC)   • Nicotine dependence with nicotine-induced disorder         Medications, Allergies, Social, and Family Histories reviewed as per EMR.    Objective     Vitals:    01/17/22 1321   BP: 134/80   Pulse: 108   Temp: 96.8 °F (36 °C)   SpO2: 95%         01/17/22  1321   Weight: 89.5 kg (197 lb 6.4 oz)     [unfilled]  Physical Exam  Vitals reviewed.   Constitutional:       Appearance: Normal appearance.   HENT:      Head: Normocephalic and atraumatic.      Right Ear: Tympanic membrane normal.      Left Ear: Tympanic membrane normal.      Nose: Nose normal.      Mouth/Throat:      Mouth: Mucous membranes are moist.      Pharynx: Oropharynx is clear.   Eyes:      Conjunctiva/sclera: Conjunctivae normal.      Pupils: Pupils are equal, round, and reactive to light.   Cardiovascular:      Rate and Rhythm: Normal rate and regular rhythm.      Pulses: Normal pulses.      Heart sounds: Normal heart sounds.   Pulmonary:      Effort: Pulmonary effort is normal.      Breath sounds: Normal breath sounds.   Abdominal:      General: Abdomen is flat. Bowel sounds are normal.      Palpations: Abdomen is soft.   Musculoskeletal:         General: Normal range of motion.      Cervical back: Normal range of motion and neck supple.   Skin:     General: Skin is warm and dry.   Neurological:      General: No focal deficit present.      Mental Status: He is alert and oriented to person, place, and time.   Psychiatric:         Mood and Affect: Mood normal.         Behavior: Behavior normal.       Radiology  CT angio chest 1/29/21- emphysema, no concerning nodules  LDCT 1/4/22- stable. 3mm and 4mm nodule reported. No masses, effusions.      PFTs   6/22/21  FVC 2.31L, 50%  FEV1 1.15L, 32%  Ratio 50%  TLV 6.27L, 92%  RV  168%  DLCO 47%    Assessment/Plan     Diagnoses and all orders for this visit:    1. COPD, severe (HCC) (Primary)    2. Nicotine dependence with nicotine-induced disorder, unspecified nicotine product type         Discussion/ Recommendations:   Patient doing ok with currrent meds. No concering findings on CT scan. Can follow up in 6 months     -Spiriva and Breo daily  -Biotene mouth wash for dry mouth  -Flonase nightly  -albuterol inhlaer and nebs prn  -continue with smoking cessation efforts  -CT chest Jan 2023    Return in about 6 months (around 7/17/2022).          This document has been electronically signed by Hanna Tiwari DO on January 17, 2022 13:41 CST

## 2022-02-08 ENCOUNTER — TRANSCRIBE ORDERS (OUTPATIENT)
Dept: PHYSICAL THERAPY | Facility: HOSPITAL | Age: 62
End: 2022-02-08

## 2022-02-08 DIAGNOSIS — M79.606 PAIN OF LOWER EXTREMITY, UNSPECIFIED LATERALITY: Primary | ICD-10-CM

## 2022-02-10 ENCOUNTER — HOSPITAL ENCOUNTER (OUTPATIENT)
Dept: PHYSICAL THERAPY | Facility: HOSPITAL | Age: 62
Setting detail: THERAPIES SERIES
Discharge: HOME | End: 2022-02-10

## 2022-02-10 DIAGNOSIS — M79.606 PAIN OF LOWER EXTREMITY, UNSPECIFIED LATERALITY: Primary | ICD-10-CM

## 2022-02-10 PROCEDURE — 97161 PT EVAL LOW COMPLEX 20 MIN: CPT

## 2022-02-10 NOTE — THERAPY EVALUATION
Outpatient Physical Therapy Ortho Initial Evaluation  [facility]     Patient Name: Jeff Werner  : 1960  MRN: [id number]  Today's Date: 2/10/2022      Visit Date: 02/10/2022     ATTENDANCE:   SUBJECTIVE IMPROVEMENT: not assessed at initial evaluation  NEXT MD APPOINTMENT: [facility]  RECERT DATE: 3/3/22    THERAPY DIAGNOSIS: low back pain with L radiating pain/numbness      Patient Active Problem List   Diagnosis   • Right knee pain   • Degenerative disc disease, lumbar   • Internal derangement of right knee   • Chronic midline low back pain without sciatica   • Mechanical pain of right knee   • Numbness and tingling of left leg   • COPD exacerbation (HCC)   • Acute respiratory failure with hypoxia ([facility])   • Hypertension   • Elevated cholesterol   • Tobacco abuse   • Hypokalemia   • Witnessed episode of apnea   • Sleep disturbances   • UMU (obstructive sleep apnea)Suspected   • COPD, severe ([facility])   • Nicotine dependence with nicotine-induced disorder        Past Medical History:   Diagnosis Date   • Arthritis of back    • Asthma    • COPD (chronic obstructive pulmonary disease) ([facility])    • Degenerative disc disease, lumbar    • Elevated cholesterol    • Hypertension    • Lumbosacral disc disease     history of disc hernations in the lumbar spine   • Type A influenza 2020        Past Surgical History:   Procedure Laterality Date   • COLONOSCOPY N/A 2018    Procedure: COLONOSCOPY;  Surgeon: Jovanny Landis DO;  Location: [facility] ENDOSCOPY;  Service: Gastroenterology       Visit Dx:     ICD-10-CM ICD-9-CM   1. Pain of lower extremity, unspecified laterality  M79.606 729.5          Patient History     Row Name 02/10/22 1300             History    Chief Complaint Pain; Numbness; Tinglings  -AC      Type of Pain Back pain; Lower Extremity / Leg  -AC      Brief Description of Current Complaint Pt notes that a few years ago he started having some numbness in his leg which runs on the side of the  leg down to his knee. Used to come and go however now is constant now and occasionally turns into a burning sensation. Notes that he does have hx of low back pain and degenerative disc and bone spurring in the low back. Reports has previously for R knee injury however has not completed PT for low back or L leg pain. Uses OTC pain meds as needed and occasional muscle relaxer as needed. hx of multiple lumbar disc rupture due to work injury in early 2000s.  -AC      Patient/Caregiver Goals Relieve pain; Return to prior level of function  -AC      Current Tobacco Use yes  -AC      Smoking Status PPD- trying to decrease  -AC      Patient's Rating of General Health Fair  -AC      Occupation/sports/leisure activities .  -AC      What clinical tests have you had for this problem? X-ray  -AC      Results of Clinical Tests unable to view results  -AC              Pain     Pain Location Back; Leg  -AC      Pain at Present 0  -AC      Pain at Best 0  -AC      Pain at Worst 9  -AC      Pain Frequency Intermittent  -AC      Pain Description Burning; Tingling  -AC      What Performance Factors Make the Current Problem(s) WORSE? unsure- maybe lifting or extended sitting/standing  -AC      What Performance Factors Make the Current Problem(s) BETTER? IBP  -AC      Is your sleep disturbed? Yes  occasionally  -AC      Difficulties at work? only with any lifting needed at work.  -AC      Difficulties with ADL's? occasionally with bending for socks. Very sensitive with light touch to leg  -AC              Fall Risk Assessment    Any falls in the past year: No  -AC              Services    Prior Rehab/Home Health Experiences No  -AC              Daily Activities    Primary Language English  -AC            User Key  (r) = Recorded By, (t) = Taken By, (c) = Cosigned By    Initials Name Provider Type    AC Suzette Teixeira PT Physical Therapist                 PT Ortho     Row Name 02/10/22 1300       Subjective Comments     Subjective Comments see pt hx.  -AC       Precautions and Contraindications    Precautions/Limitations no known precautions/limitations  -AC       Subjective Pain    Able to rate subjective pain? yes  -AC    Pre-Treatment Pain Level 0  -AC       Posture/Observations    Posture/Observations Comments mild palpation tenderness along the L ITB. tightness noted with palpation to global lumbar paraspinals and L glutes.  -AC       General ROM    Head/Neck/Trunk Trunk Extension; Trunk Flexion; Trunk Lt Lateral Flexion; Trunk Rt Lateral Flexion; Trunk Lt Rotation; Trunk Rt Rotation; Comments  -AC       Head/Neck/Trunk    Trunk Extension AROM WNLs- pain  -AC    Trunk Flexion AROM tips to distal tibia- pain returning to standing  -AC    Trunk Lt Lateral Flexion AROM WNLs  -AC    Trunk Rt Lateral Flexion AROM WNLs  -AC    Trunk Lt Rotation AROM limited 50%  -AC    Trunk Rt Rotation AROM limited 50%  -AC    Head/Neck/Trunk Comments stretching only with lateral flexion dn rotation  -AC       MMT (Manual Muscle Testing)    Rt Lower Ext Comments  -AC    Lt Lower Ext Lt Hip Flexion; Lt Hip Extension; Lt Hip ABduction; Lt Hip ADduction; Lt Hip Internal (Medial) Rotation; Lt Hip External (Lateral) Rotation; Lt Knee Extension; Lt Knee Flexion; Lt Ankle Plantarflexion; Lt Ankle Dorsiflexion; Lt Ankle Subtalar Inversion; Lt Ankle Subtalar Eversion  -AC       MMT Right Lower Ext    Rt Lower Extremity Comments  grossly 5/5  -AC       MMT Left Lower Ext    Lt Hip Flexion MMT, Gross Movement (4+/5) good plus  -AC    Lt Hip Extension MMT, Gross Movement (4/5) good  -AC    Lt Hip ABduction MMT, Gross Movement (4+/5) good plus  -AC    Lt Hip ADduction MMT, Gross Movement (4+/5) good plus  -AC    Lt Hip Internal (Medial) Rotation MMT, Gross Movement (4/5) good  -AC    Lt Hip External (Lateral) Rotation MMT, Gross Movement (4/5) good  -AC    Lt Knee Extension MMT, Gross Movement (5/5) normal  -AC    Lt Knee Flexion MMT, Gross Movement (5/5)  normal  -AC    Lt Ankle Plantarflexion MMT, Gross Movement (5/5) normal  -AC    Lt Ankle Dorsiflexion MMT, Gross Movement (5/5) normal  -AC    Lt Ankle Subtalar Inversion MMT, Gross Movement (5/5) normal  -AC    Lt Ankle Subtalar Eversion MMT, Gross Movement (5/5) normal  -AC    Lt Lower Extremity Comments  SLR 4/5.  -AC       Sensation    Additional Comments numbness/tinging in the LLE- hip to knee.  -AC       Flexibility    Flexibility Tested? Lower Extremity  -AC       Lower Extremity Flexibility    Hamstrings Moderately limited  -AC    Hip Flexors Moderately limited  -AC    Quadriceps Moderately limited  -AC    Hip External Rotators Moderately limited  -AC    Hip Internal Rotators Moderately limited  -AC    Quadratus Lumborum Moderately limited  -AC    Gastrocnemius Moderately limited  -AC    Soleus Mildly limited  -AC          User Key  (r) = Recorded By, (t) = Taken By, (c) = Cosigned By    Initials Name Provider Type    AC Suzette Teixeira, PT Physical Therapist                            Therapy Education  Education Details: stretching- HS, gastroc/soleus, ITB, quad/hip flexor with strap, QL, and piriformis  Given: HEP  Program: New  How Provided: Verbal, Demonstration, Written  Provided to: Patient  Level of Understanding: Verbalized, Demonstrated      PT OP Goals     Row Name 02/10/22 1400          PT Short Term Goals    STG Date to Achieve 03/10/22  -AC     STG 1 Pt is indpt with HEP.  -AC     STG 1 Progress New  -     STG 2 PT demos minimal to no BLE flexibility limitations.  -AC     STG 2 Progress New  -AC     STG 3 Pt is able to compete AROM trunk WFLs with minimal pain increase.  -AC     STG 3 Progress New  -AC            Long Term Goals    LTG Date to Achieve 04/07/22  -AC     LTG 1 LLE MMT 4+/5 or better in all planes.  -AC     LTG 1 Progress New  -AC     LTG 2 Pt will note centralization of radicular symptoms not past L buttox.  -AC     LTG 2 Progress New  -AC     LTG 3 LEFS improved to 40/80  or better.  -     LTG 3 Progress New  -            Time Calculation    PT Goal Re-Cert Due Date 03/03/22  -           User Key  (r) = Recorded By, (t) = Taken By, (c) = Cosigned By    Initials Name Provider Type    Suzette Noriega PT Physical Therapist                 PT Assessment/Plan     Row Name 02/10/22 1300          PT Assessment    Functional Limitations Limitation in home management; Limitations in community activities; Performance in leisure activities; Performance in self-care ADL  -AC     Impairments Gait; Impaired flexibility; Muscle strength; Pain; Peripheral nerve integrity; Posture; Range of motion  -     Assessment Comments The pt is a 60 y/o male who presents today with L upper leg pain and numbness. He has a hx of low back pain and lumbar DDD. He presents today with overall decreased LE and trunk flexibility limitations and poor L hip strength noted compared to R. He has mild tenderness with lumbarparaspinals and glutes and moderate tenderness to palpation of L distal ITB. He will benefit from skilled PT to improve overall BLE and trunk flexibility and strength to decrease pain and numbness for return to PLOF.  -AC     Rehab Potential Good  -AC     Patient/caregiver participated in establishment of treatment plan and goals Yes  -AC     Patient would benefit from skilled therapy intervention Yes  -AC            PT Plan    PT Frequency 2x/week  -AC     Predicted Duration of Therapy Intervention (PT) 6-8 weeks  -AC     PT Plan Comments BLE and core stretching/strength. trial traction for radiating numbness/tingling. other modalities and manual as needed for pain control.  -AC           User Key  (r) = Recorded By, (t) = Taken By, (c) = Cosigned By    Initials Name Provider Type    Suzette Noriega PT Physical Therapist                                    Outcome Measure Options: Lower Extremity Functional Scale (LEFS)  Lower Extremity Functional Index  Any of your usual work,  housework or school activities: Moderate difficulty  Your usual hobbies, recreational or sporting activities: Extreme difficulty or unable to perform activity  Getting into or out of the bath: Moderate difficulty  Walking between rooms: A little bit of difficulty  Putting on your shoes or socks: A little bit of difficulty  Squatting: Quite a bit of difficulty  Lifting an object, like a bag of groceries from the floor: Moderate difficulty  Performing light activities around your home: A little bit of difficulty  Performing heavy activities around your home: Quite a bit of difficulty  Getting into or out of a car: A little bit of difficulty  Walking 2 blocks: Extreme difficulty or unable to perform activity  Walking a mile: Extreme difficulty or unable to perform activity  Going up or down 10 stairs (about 1 flight of stairs): Quite a bit of difficulty  Standing for 1 hour: Quite a bit of difficulty  Sitting for 1 hour: Moderate difficulty  Running on even ground: Extreme difficulty or unable to perform activity  Running on uneven ground: Extreme difficulty or unable to perform activity  Making sharp turns while running fast: Extreme difficulty or unable to perform activity  Hopping: Extreme difficulty or unable to perform activity  Rolling over in bed: No difficulty  Total: 28      Time Calculation:     Start Time: 1304  Stop Time: 1340  Time Calculation (min): 36 min  Untimed Charges  PT Eval/Re-eval Minutes: 36  Total Minutes  Untimed Charges Total Minutes: 36   Total Minutes: 36     Therapy Charges for Today     Code Description Service Date Service Provider Modifiers Qty    81212139373 HC PT EVAL LOW COMPLEXITY 3 2/10/2022 Suzette Teixeira, PT GP 1                   Suzette Teixeira, PT  2/10/2022

## 2022-02-14 ENCOUNTER — HOSPITAL ENCOUNTER (OUTPATIENT)
Dept: PHYSICAL THERAPY | Facility: HOSPITAL | Age: 62
Setting detail: THERAPIES SERIES
Discharge: HOME | End: 2022-02-14

## 2022-02-14 DIAGNOSIS — M79.606 PAIN OF LOWER EXTREMITY, UNSPECIFIED LATERALITY: Primary | ICD-10-CM

## 2022-02-14 PROCEDURE — 97012 MECHANICAL TRACTION THERAPY: CPT

## 2022-02-14 PROCEDURE — 97110 THERAPEUTIC EXERCISES: CPT

## 2022-02-14 NOTE — THERAPY TREATMENT NOTE
Outpatient Physical Therapy Ortho Treatment Note  [facility]     Patient Name: Jeff Werner  : 1960  MRN: [id number]  Today's Date: 2022      Visit Date: 2022    Attendance:    Approved  Subjective Improvement:  JESSICA GUDINO Appt:    prn  Recheck Due:  3-03-22    Visit Dx:    ICD-10-CM ICD-9-CM   1. Pain of lower extremity, unspecified laterality  M79.606 729.5       Patient Active Problem List   Diagnosis   • Right knee pain   • Degenerative disc disease, lumbar   • Internal derangement of right knee   • Chronic midline low back pain without sciatica   • Mechanical pain of right knee   • Numbness and tingling of left leg   • COPD exacerbation ([facility])   • Acute respiratory failure with hypoxia ([facility])   • Hypertension   • Elevated cholesterol   • Tobacco abuse   • Hypokalemia   • Witnessed episode of apnea   • Sleep disturbances   • UMU (obstructive sleep apnea)Suspected   • COPD, severe ([facility])   • Nicotine dependence with nicotine-induced disorder        Past Medical History:   Diagnosis Date   • Arthritis of back    • Asthma    • COPD (chronic obstructive pulmonary disease) ([facility])    • Degenerative disc disease, lumbar    • Elevated cholesterol    • Hypertension    • Lumbosacral disc disease     history of disc hernations in the lumbar spine   • Type A influenza 2020        Past Surgical History:   Procedure Laterality Date   • COLONOSCOPY N/A 2018    Procedure: COLONOSCOPY;  Surgeon: Jovanny Landis DO;  Location: [facility] ENDOSCOPY;  Service: Gastroenterology        PT Ortho     Row Name 22 1500       Posture/Observations    Posture/Observations Comments LL=; pelvis level  -TM    Row Name 22 1400       Subjective Comments    Subjective Comments Gets pain in L thigh anterior and lateral.  -TM       Precautions and Contraindications    Precautions/Limitations no known precautions/limitations  -TM       Subjective Pain    Able to rate subjective pain? yes  -TM     "Pre-Treatment Pain Level 0  -TM    Post-Treatment Pain Level 0  -TM          User Key  (r) = Recorded By, (t) = Taken By, (c) = Cosigned By    Initials Name Provider Type     Cynthia Rivera PTA Physical Therapy Assistant                             PT Assessment/Plan     Row Name 02/14/22 1400          PT Assessment    Assessment Comments Pt tolerated stretches well.  Pelvic alignment was symmetrical.  Initiated mechanical lumbar traction without difficulty.  -TM            PT Plan    PT Frequency 2x/week  -TM     Predicted Duration of Therapy Intervention (PT) 6-8 weeks  -TM     PT Plan Comments Increase weight with traction next visit.  Add isometric trans abs to therex.  -TM           User Key  (r) = Recorded By, (t) = Taken By, (c) = Cosigned By    Initials Name Provider Type     Cynthia Rivera PTA Physical Therapy Assistant                 Modalities     Row Name 02/14/22 1400             Traction 92988    Traction Type Lumbar  -TM      PT Traction Rx Minutes 10  -TM      Duration Intermittent  -TM      Position Hook-lying  -TM      Weight 70  min 35#  -TM      Hold 60  -TM      Relax 10  -TM      Progression 2  -TM      Regression 2  -TM            User Key  (r) = Recorded By, (t) = Taken By, (c) = Cosigned By    Initials Name Provider Type     Cynthia Rivera PTA Physical Therapy Assistant               OP Exercises     Row Name 02/14/22 1400             Subjective Comments    Subjective Comments Gets pain in L thigh anterior and lateral.  -TM              Subjective Pain    Able to rate subjective pain? yes  -TM      Pre-Treatment Pain Level 0  -TM      Post-Treatment Pain Level 0  -TM              Exercise 1    Exercise Name 1 PRO II ROM  -TM      Time 1 8 min  -TM      Additional Comments L 2  -TM              Exercise 2    Exercise Name 2 standing B HS stretch  -TM      Sets 2 2  -TM      Time 2 30\"  -TM              Exercise 3    Exercise Name 3 standing L IT Band stretch  -TM      " "Sets 3 2  -TM      Time 3 30\"  -TM              Exercise 4    Exercise Name 4 supine piriformis stretch  -TM      Sets 4 2  -TM      Time 4 30\"  -TM              Exercise 5    Exercise Name 5 gentle LTR  -TM      Sets 5 1  -TM      Reps 5 5  -TM      Time 5 10\" hold  -TM              Exercise 6    Exercise Name 6 L LAQ nerve glide  -TM      Sets 6 3  -TM      Reps 6 10 AP  -TM              Exercise 7    Exercise Name 7 assess alignment  -TM              Exercise 8    Exercise Name 8 traction: see flowsheet  -TM            User Key  (r) = Recorded By, (t) = Taken By, (c) = Cosigned By    Initials Name Provider Type     Cynthia Rivera PTA Physical Therapy Assistant                              PT OP Goals     Row Name 02/14/22 1400          PT Short Term Goals    STG Date to Achieve 03/10/22  -TM     STG 1 Pt is indpt with HEP.  -TM     STG 1 Progress Ongoing  -TM     STG 2 PT demos minimal to no BLE flexability limitations.  -TM     STG 2 Progress Ongoing  -TM     STG 3 Pt is able to compete AROM trunk WFLs with minimal pain increase.  -TM     STG 3 Progress Ongoing  -TM            Long Term Goals    LTG Date to Achieve 04/07/22  -TM     LTG 1 LLE MMT 4+/5 or better in all planes.  -TM     LTG 1 Progress Ongoing  -TM     LTG 2 Pt will note centralization of radicular symptoms not past L buttox.  -TM     LTG 2 Progress Ongoing  -TM     LTG 3 LEFS improved to 40/80 or better.  -TM     LTG 3 Progress Ongoing  -TM           User Key  (r) = Recorded By, (t) = Taken By, (c) = Cosigned By    Initials Name Provider Type     Cynthia Rivera PTA Physical Therapy Assistant                               Time Calculation:   Start Time: 1430  Stop Time: 1525  Time Calculation (min): 55 min  Total Timed Code Minutes- PT: 45 minute(s)  Untimed Charges  PT Traction Rx Minutes: 10  Total Minutes  Untimed Charges Total Minutes: 10   Total Minutes: 10  Therapy Charges for Today     Code Description Service Date Service " Provider Modifiers Qty    09561753373 HC PT-TRACTION MECHANICAL 2/14/2022 Cynthia Rivera, PTA  1    62337345970 HC PT THER PROC EA 15 MIN 2/14/2022 Cynthia Rivera, ASHLEY GP 2                    Cynthia Rivera, PTA  2/14/2022

## 2022-02-17 ENCOUNTER — HOSPITAL ENCOUNTER (OUTPATIENT)
Dept: PHYSICAL THERAPY | Facility: HOSPITAL | Age: 62
Setting detail: THERAPIES SERIES
Discharge: HOME | End: 2022-02-17

## 2022-02-17 DIAGNOSIS — M79.606 PAIN OF LOWER EXTREMITY, UNSPECIFIED LATERALITY: Primary | ICD-10-CM

## 2022-02-17 PROCEDURE — 97012 MECHANICAL TRACTION THERAPY: CPT

## 2022-02-17 PROCEDURE — 97110 THERAPEUTIC EXERCISES: CPT

## 2022-02-17 NOTE — THERAPY TREATMENT NOTE
Outpatient Physical Therapy Ortho Treatment Note  [facility]     Patient Name: Jeff Werner  : 1960  MRN: [id number]  Today's Date: 2022      Visit Date: 2022    Attendance:   3/3 of 20 Approved  Subjective Improvement:  JESSICA GUDINO Appt:   prn  Recheck Due:   3-03-22    Visit Dx:    ICD-10-CM ICD-9-CM   1. Pain of lower extremity, unspecified laterality  M79.606 729.5       Patient Active Problem List   Diagnosis   • Right knee pain   • Degenerative disc disease, lumbar   • Internal derangement of right knee   • Chronic midline low back pain without sciatica   • Mechanical pain of right knee   • Numbness and tingling of left leg   • COPD exacerbation ([facility])   • Acute respiratory failure with hypoxia ([facility])   • Hypertension   • Elevated cholesterol   • Tobacco abuse   • Hypokalemia   • Witnessed episode of apnea   • Sleep disturbances   • UMU (obstructive sleep apnea)Suspected   • COPD, severe ([facility])   • Nicotine dependence with nicotine-induced disorder        Past Medical History:   Diagnosis Date   • Arthritis of back    • Asthma    • COPD (chronic obstructive pulmonary disease) ([facility])    • Degenerative disc disease, lumbar    • Elevated cholesterol    • Hypertension    • Lumbosacral disc disease     history of disc hernations in the lumbar spine   • Type A influenza 2020        Past Surgical History:   Procedure Laterality Date   • COLONOSCOPY N/A 2018    Procedure: COLONOSCOPY;  Surgeon: Jovanny Landis DO;  Location: [facility] ENDOSCOPY;  Service: Gastroenterology        PT Ortho     Row Name 22 1300       Precautions and Contraindications    Precautions/Limitations no known precautions/limitations  -TM       Subjective Pain    Able to rate subjective pain? yes  -TM    Pre-Treatment Pain Level 0  -TM    Post-Treatment Pain Level 1  -TM          User Key  (r) = Recorded By, (t) = Taken By, (c) = Cosigned By    Initials Name Provider Type    TM Cynthia Rivera, ASHLEY  "Physical Therapy Assistant                             PT Assessment/Plan     Row Name 02/17/22 1300          PT Assessment    Assessment Comments Trial of HECTOR provided no change in radicular symptoms.  Increased weight with traction by 10#.  Isometric trans abs ex aded to HEP.  -TM            PT Plan    PT Frequency 2x/week  -TM     Predicted Duration of Therapy Intervention (PT) 6-8 weeks  -TM     PT Plan Comments Try seated repeated flexion.  -TM           User Key  (r) = Recorded By, (t) = Taken By, (c) = Cosigned By    Initials Name Provider Type     Cynthia Rivera PTA Physical Therapy Assistant                 Modalities     Row Name 02/17/22 1300             Traction [id number]    Traction Type Lumbar  -TM      PT Traction Rx Minutes 12  -TM      Duration Intermittent  -TM      Position Hook-lying  -TM      Weight 80  35 min  -TM      Hold 60  -TM      Relax 10  -TM      Progression 2  -TM      Regression 2  -TM            User Key  (r) = Recorded By, (t) = Taken By, (c) = Cosigned By    Initials Name Provider Type     Cynthia Rivera PTA Physical Therapy Assistant               OP Exercises     Row Name 02/17/22 1300             Subjective Comments    Subjective Comments Reports no pain currently, but L LE is numb to the knee.  Was a little \"sore\" after last treatment, but felt that the weight needed to be more with traction.  -TM              Subjective Pain    Able to rate subjective pain? yes  -TM      Pre-Treatment Pain Level 0  -TM      Post-Treatment Pain Level 1  -TM              Exercise 1    Exercise Name 1 PRO II ROM  -TM      Time 1 8 min  -TM              Exercise 2    Exercise Name 2 standing B HS stretch  -TM      Sets 2 3  -TM      Time 2 30\"  -TM              Exercise 3    Exercise Name 3 supine piriformis stretch  -TM      Sets 3 2  -TM      Time 3 30\"  -TM              Exercise 4    Exercise Name 4 gentle LTR  -TM      Reps 4 5  -TM      Time 4 10\" hold  -TM              Exercise " "5    Exercise Name 5 L LAQ nerve glide  -TM      Sets 5 3  -TM      Reps 5 10 AP  -TM              Exercise 6    Exercise Name 6 isometric trans abs  -TM      Cueing 6 Verbal  -TM      Sets 6 1  -TM      Reps 6 10  -TM      Time 6 5\" hold  -TM              Exercise 7    Exercise Name 7 HECTOR  -TM      Time 7 3 min  -TM              Exercise 8    Exercise Name 8 traction: see flowsheet  -TM            User Key  (r) = Recorded By, (t) = Taken By, (c) = Cosigned By    Initials Name Provider Type     Cynthia Rivera PTA Physical Therapy Assistant                              PT OP Goals     Row Name 02/17/22 1300          PT Short Term Goals    STG Date to Achieve 03/10/22  -TM     STG 1 Pt is indpt with HEP.  -TM     STG 1 Progress Ongoing  -TM     STG 2 PT demos minimal to no BLE flexability limitations.  -TM     STG 2 Progress Ongoing  -TM     STG 3 Pt is able to compete AROM trunk WFLs with minimal pain increase.  -TM     STG 3 Progress Ongoing  -TM            Long Term Goals    LTG Date to Achieve 04/07/22  -TM     LTG 1 LLE MMT 4+/5 or better in all planes.  -TM     LTG 1 Progress Ongoing  -TM     LTG 2 Pt will note centralization of radicular symptoms not past L buttox.  -TM     LTG 2 Progress Ongoing  -TM     LTG 3 LEFS improved to 40/80 or better.  -TM     LTG 3 Progress Ongoing  -TM           User Key  (r) = Recorded By, (t) = Taken By, (c) = Cosigned By    Initials Name Provider Type     Cynthia Rivera PTA Physical Therapy Assistant                               Time Calculation:   Start Time: 1300  Stop Time: 1400  Time Calculation (min): 60 min  Total Timed Code Minutes- PT: 48 minute(s)  Untimed Charges  PT Traction Rx Minutes: 12  Total Minutes  Untimed Charges Total Minutes: 12   Total Minutes: 12  Therapy Charges for Today     Code Description Service Date Service Provider Modifiers Qty    97849316096 HC PT-TRACTION MECHANICAL 2/17/2022 Cynthia Rivera PTA  1    50259528163 HC PT THER " PROC EA 15 MIN 2/17/2022 Cynthia Rivera, PTA GP 3                    Cynthia Rivera, PTA  2/17/2022

## 2022-02-21 ENCOUNTER — HOSPITAL ENCOUNTER (OUTPATIENT)
Dept: PHYSICAL THERAPY | Facility: HOSPITAL | Age: 62
Setting detail: THERAPIES SERIES
Discharge: HOME | End: 2022-02-21

## 2022-02-21 DIAGNOSIS — M79.606 PAIN OF LOWER EXTREMITY, UNSPECIFIED LATERALITY: Primary | ICD-10-CM

## 2022-02-21 PROCEDURE — 97110 THERAPEUTIC EXERCISES: CPT

## 2022-02-21 PROCEDURE — 97012 MECHANICAL TRACTION THERAPY: CPT

## 2022-02-24 ENCOUNTER — APPOINTMENT (OUTPATIENT)
Dept: PHYSICAL THERAPY | Facility: HOSPITAL | Age: 62
End: 2022-02-24

## 2022-02-28 ENCOUNTER — APPOINTMENT (OUTPATIENT)
Dept: PHYSICAL THERAPY | Facility: HOSPITAL | Age: 62
End: 2022-02-28

## 2022-03-01 ENCOUNTER — HOSPITAL ENCOUNTER (OUTPATIENT)
Dept: PHYSICAL THERAPY | Facility: HOSPITAL | Age: 62
Setting detail: THERAPIES SERIES
Discharge: HOME | End: 2022-03-01

## 2022-03-01 DIAGNOSIS — M79.606 PAIN OF LOWER EXTREMITY, UNSPECIFIED LATERALITY: Primary | ICD-10-CM

## 2022-03-01 PROCEDURE — 97110 THERAPEUTIC EXERCISES: CPT

## 2022-03-01 PROCEDURE — 97012 MECHANICAL TRACTION THERAPY: CPT

## 2022-03-01 NOTE — THERAPY TREATMENT NOTE
Outpatient Physical Therapy Ortho Treatment Note  [facility]     Patient Name: Jeff Werner  : 1960  MRN: [id number]  Today's Date: 3/1/2022      Visit Date: 2022    Attendance:    (20 Approved)  Subjective Improvement:  none   MD Appt:   prn  Recheck Due:   3-03-22    Visit Dx:    ICD-10-CM ICD-9-CM   1. Pain of lower extremity, unspecified laterality  M79.606 729.5       Patient Active Problem List   Diagnosis   • Right knee pain   • Degenerative disc disease, lumbar   • Internal derangement of right knee   • Chronic midline low back pain without sciatica   • Mechanical pain of right knee   • Numbness and tingling of left leg   • COPD exacerbation ([facility])   • Acute respiratory failure with hypoxia ([facility])   • Hypertension   • Elevated cholesterol   • Tobacco abuse   • Hypokalemia   • Witnessed episode of apnea   • Sleep disturbances   • UMU (obstructive sleep apnea)Suspected   • COPD, severe ([facility])   • Nicotine dependence with nicotine-induced disorder        Past Medical History:   Diagnosis Date   • Arthritis of back    • Asthma    • COPD (chronic obstructive pulmonary disease) ([facility])    • Degenerative disc disease, lumbar    • Elevated cholesterol    • Hypertension    • Lumbosacral disc disease     history of disc hernations in the lumbar spine   • Type A influenza 2020        Past Surgical History:   Procedure Laterality Date   • COLONOSCOPY N/A 2018    Procedure: COLONOSCOPY;  Surgeon: Jovanny Landis DO;  Location: [facility] END[facility];  Service: Gastroenterology        PT Ortho     Row Name 22 1000       Precautions and Contraindications    Precautions/Limitations no known precautions/limitations  -TM       Subjective Pain    Able to rate subjective pain? yes  -TM    Pre-Treatment Pain Level 3  -TM    Post-Treatment Pain Level 0  -TM          User Key  (r) = Recorded By, (t) = Taken By, (c) = Cosigned By    Initials Name Provider Type    TM Cynthia Rivera PTA  "Physical Therapy Assistant                             PT Assessment/Plan     Row Name 03/01/22 1100          PT Assessment    Assessment Comments Pt reporting no subjective improvement.  Has completed 4 traction treatments with gradual increase in weight and time.  Does show improved cores stability in supine.  -TM            PT Plan    PT Frequency 2x/week  -TM     Predicted Duration of Therapy Intervention (PT) 6-8 weeks  -TM     PT Plan Comments PT recheck next visit.  -TM           User Key  (r) = Recorded By, (t) = Taken By, (c) = Cosigned By    Initials Name Provider Type     Cynthia Rivera PTA Physical Therapy Assistant                 Modalities     Row Name 03/01/22 1000             Traction 67824    Traction Type Lumbar  -TM      PT Traction Rx Minutes 15  -TM      Duration Intermittent  -TM      Position Hook-lying  35#  -TM      Weight 85  -TM      Hold 60  -TM      Relax 10  -TM      Progression 2  -TM      Regression 2  -TM            User Key  (r) = Recorded By, (t) = Taken By, (c) = Cosigned By    Initials Name Provider Type     Cynthia Rivera PTA Physical Therapy Assistant               OP Exercises     Row Name 03/01/22 1000             Subjective Comments    Subjective Comments Reports the L LE remains numb to the knee and is painful.  No increased pain following traction treatment last visit.  -TM              Subjective Pain    Able to rate subjective pain? yes  -TM      Pre-Treatment Pain Level 3  -TM      Post-Treatment Pain Level 0  -TM              Exercise 1    Exercise Name 1 PRO II ROM  -TM      Time 1 8 min  -TM      Additional Comments L 3  -TM              Exercise 2    Exercise Name 2 standing B HS stretch  -TM      Sets 2 2  -TM      Time 2 30\"  -TM              Exercise 3    Exercise Name 3 supine piriformis stretch  -TM      Sets 3 2  -TM      Time 3 30\"  -TM              Exercise 4    Exercise Name 4 supine LTR  -TM      Sets 4 1  -TM      Reps 4 5  -TM      Time " "4 10\" hold  -TM              Exercise 5    Exercise Name 5 trans abs with Add  -TM      Sets 5 1  -TM      Reps 5 15  -TM      Time 5 5\"  -TM              Exercise 6    Exercise Name 6 BKLL with trans abs  -TM      Sets 6 1  -TM      Reps 6 15  -TM              Exercise 7    Exercise Name 7 seated repeated flexion  -TM      Sets 7 1  -TM      Reps 7 10  -TM              Exercise 8    Exercise Name 8 traction: see flowsheet  -TM            User Key  (r) = Recorded By, (t) = Taken By, (c) = Cosigned By    Initials Name Provider Type    TM Cynthia Rivera, ASHLEY Physical Therapy Assistant                              PT OP Goals     Row Name 03/01/22 1100          PT Short Term Goals    STG Date to Achieve 03/10/22  -TM     STG 1 Pt is indpt with HEP.  -TM     STG 1 Progress Progressing  -TM     STG 2 PT demos minimal to no BLE flexability limitations.  -TM     STG 2 Progress Ongoing  -TM     STG 3 Pt is able to compete AROM trunk WFLs with minimal pain increase.  -TM     STG 3 Progress Ongoing  -TM            Long Term Goals    LTG Date to Achieve 04/07/22  -TM     LTG 1 LLE MMT 4+/5 or better in all planes.  -TM     LTG 1 Progress Ongoing  -TM     LTG 2 Pt will note centralization of radicular symptoms not past L buttox.  -TM     LTG 2 Progress Ongoing  -TM     LTG 3 LEFS improved to 40/80 or better.  -TM     LTG 3 Progress Ongoing  -TM           User Key  (r) = Recorded By, (t) = Taken By, (c) = Cosigned By    Initials Name Provider Type    TM Cynthia Rivera PTA Physical Therapy Assistant                               Time Calculation:   Start Time: 1100  Stop Time: 1155  Time Calculation (min): 55 min  Total Timed Code Minutes- PT: 40 minute(s)  Untimed Charges  PT Traction Rx Minutes: 15  Total Minutes  Untimed Charges Total Minutes: 15   Total Minutes: 15  Therapy Charges for Today     Code Description Service Date Service Provider Modifiers Qty    42987171513 HC PT-TRACTION MECHANICAL 3/1/2022 Nicole, " Cynthia Silva, PTA  1    [id number]  PT THER PROC EA 15 MIN 3/1/2022 Cynthia Rviera, PTA GP 3                    Cynthia Rivera, PTA  3/1/2022

## 2022-03-03 ENCOUNTER — HOSPITAL ENCOUNTER (OUTPATIENT)
Dept: PHYSICAL THERAPY | Facility: HOSPITAL | Age: 62
Setting detail: THERAPIES SERIES
Discharge: HOME | End: 2022-03-03

## 2022-03-03 DIAGNOSIS — M79.606 PAIN OF LOWER EXTREMITY, UNSPECIFIED LATERALITY: Primary | ICD-10-CM

## 2022-03-03 PROCEDURE — 97110 THERAPEUTIC EXERCISES: CPT

## 2022-03-03 PROCEDURE — 97140 MANUAL THERAPY 1/> REGIONS: CPT

## 2022-03-03 NOTE — THERAPY PROGRESS REPORT/RE-CERT
"    Outpatient Physical Therapy Ortho Progress Note  [facility]     Patient Name: Jeff Werner  : 1960  MRN: [id number]  Today's Date: 3/3/2022      Visit Date: 2022     ATTENDANCE:   SUBJECTIVE IMPROVEMENT: minimal  NEXT MD APPOINTMENT: [facility]  RECERT DATE: 3/24/22    THERAPY DIAGNOSIS: LLE numbness/tingling.       Visit Dx:    ICD-10-CM ICD-9-CM   1. Pain of lower extremity, unspecified laterality  M79.606 729.5       Patient Active Problem List   Diagnosis   • Right knee pain   • Degenerative disc disease, lumbar   • Internal derangement of right knee   • Chronic midline low back pain without sciatica   • Mechanical pain of right knee   • Numbness and tingling of left leg   • COPD exacerbation ([facility])   • Acute respiratory failure with hypoxia ([facility])   • Hypertension   • Elevated cholesterol   • Tobacco abuse   • Hypokalemia   • Witnessed episode of apnea   • Sleep disturbances   • UMU (obstructive sleep apnea)Suspected   • COPD, severe ([facility])   • Nicotine dependence with nicotine-induced disorder        Past Medical History:   Diagnosis Date   • Arthritis of back    • Asthma    • COPD (chronic obstructive pulmonary disease) ([facility])    • Degenerative disc disease, lumbar    • Elevated cholesterol    • Hypertension    • Lumbosacral disc disease     history of disc hernations in the lumbar spine   • Type A influenza 2020        Past Surgical History:   Procedure Laterality Date   • COLONOSCOPY N/A 2018    Procedure: COLONOSCOPY;  Surgeon: Jovanny Landis DO;  Location: [facility] ENDOSCOPY;  Service: Gastroenterology        PT Ortho     Row Name 22 1400       Subjective Comments    Subjective Comments Pt notes that L leg pain continues to come/go. He will occasionally have no pain and occasionally has pain that increases to a 3/4 and is stabbing in nature. Reports that HEP is going well however he does occasionally skip a day. Pt notes that he \"can't tell much of a difference with " the leg since starting (PT). sometimes no pain at all, and sometimes that stabbing pain.  -AC       Precautions and Contraindications    Precautions/Limitations no known precautions/limitations  -AC       Subjective Pain    Able to rate subjective pain? yes  -AC    Pre-Treatment Pain Level 0  -AC       Posture/Observations    Posture/Observations Comments tender and guarded lumbar paraspinals and QL. forward flexed standing posture.  -AC       Head/Neck/Trunk    Head/Neck/Trunk Comments grossly WNLs except flexion which is mildly painful.  -AC       MMT Left Lower Ext    Lt Hip Flexion MMT, Gross Movement (4+/5) good plus  -AC    Lt Hip Extension MMT, Gross Movement (4+/5) good plus  -AC    Lt Hip ABduction MMT, Gross Movement (4+/5) good plus  -AC    Lt Hip ADduction MMT, Gross Movement (4+/5) good plus  -AC    Lt Hip Internal (Medial) Rotation MMT, Gross Movement (4/5) good  -AC    Lt Hip External (Lateral) Rotation MMT, Gross Movement (4/5) good  -AC    Lt Knee Extension MMT, Gross Movement (5/5) normal  -AC    Lt Knee Flexion MMT, Gross Movement (5/5) normal  -AC       Lower Extremity Flexibility    Hamstrings Moderately limited  -AC    Hip Flexors Mildly limited  -AC    Quadriceps Moderately limited  -AC    Quadratus Lumborum Mildly limited  -AC    Gastrocnemius Mildly limited  -AC          User Key  (r) = Recorded By, (t) = Taken By, (c) = Cosigned By    Initials Name Provider Type    AC Suzette Teixeira, PT Physical Therapist                             PT Assessment/Plan     Row Name 03/03/22 1500          PT Assessment    Functional Limitations Impaired gait; Impaired locomotion; Limitation in home management; Limitations in community activities; Performance in work activities  -AC     Impairments Pain; Posture; Locomotion; Impaired flexibility  -AC     Assessment Comments re-evaluation completed today with 1 STG met for HEP and 1 partially met for AROM however trunk flexion remains painful today. No LTGs  "met at this time. He continues to have decreased LLE strength however improved flexion and abduction noted today. Education provided today for prone on elbows/prone press ups to determine effects on L leg numbness. He remains appropriate for skilled PT to continue to progress LE and trunk mobility and strength and decrease LLE numbness.  -AC     Rehab Potential Fair  -AC     Patient/caregiver participated in establishment of treatment plan and goals Yes  -AC     Patient would benefit from skilled therapy intervention Yes  -AC            PT Plan    PT Frequency 2x/week  -AC     Predicted Duration of Therapy Intervention (PT) 6-8 weeks  -AC     PT Plan Comments f/u on manual and continue if beneficial. f/u on tolerance to prone activities and continue prone based exercises as pt is able to tolerate. possible d/c in next 3-4 visits if continuing to not improve.  -AC           User Key  (r) = Recorded By, (t) = Taken By, (c) = Cosigned By    Initials Name Provider Type    AC Suzette Teixeira, PT Physical Therapist                   OP Exercises     Row Name 03/03/22 1400             Subjective Comments    Subjective Comments Pt notes that L leg pain continues to come/go. He will occasionally have no pain and occasionally has pain that increases to a 3/4 and is stabbing in nature. Reports that HEP is going well however he does occasionally skip a day. Pt notes that he \"can't tell much of a difference with the leg since starting (PT). sometimes no pain at all, and sometimes that stabbing pain.  -AC              Subjective Pain    Able to rate subjective pain? yes  -AC      Pre-Treatment Pain Level 0  -AC              Total Minutes    21815 - PT Therapeutic Exercise Minutes 29  -AC      19533 - PT Manual Therapy Minutes 10  -AC              Exercise 1    Exercise Name 1 Pro II- L3  -AC      Time 1 10 min  -AC              Exercise 2    Exercise Name 2 standing HS stretch  -AC      Sets 2 2  -AC      Time 2 30s  -AC       "        Exercise 3    Exercise Name 3 incline gastroc/soleus stretch  -AC      Sets 3 2 each  -AC      Time 3 30s  -AC              Exercise 4    Exercise Name 4 prone quad stretch  -AC      Sets 4 2  -AC      Time 4 30s  -AC              Exercise 5    Exercise Name 5 ROM/MMT- see ortho.  -AC              Exercise 6    Exercise Name 6 see manual  -AC            User Key  (r) = Recorded By, (t) = Taken By, (c) = Cosigned By    Initials Name Provider Type    AC Suzette Teixeira, PT Physical Therapist                         Manual Rx (last 36 hours)     Manual Treatments     Row Name 03/03/22 1400             Total Minutes    48953 - PT Manual Therapy Minutes 10  -AC              Manual Rx 1    Manual Rx 1 Location lumbar paraspinals, QL, serratus lumborum  -AC      Manual Rx 1 Type STM  -AC      Manual Rx 1 Duration 10 min  -AC            User Key  (r) = Recorded By, (t) = Taken By, (c) = Cosigned By    Initials Name Provider Type    AC Suzette Teixeira, PT Physical Therapist                 PT OP Goals     Row Name 03/03/22 1400          PT Short Term Goals    STG Date to Achieve 03/10/22  -     STG 1 Pt is indpt with HEP.  -AC     STG 1 Progress Met; Ongoing  -     STG 2 PT demos minimal to no BLE flexability limitations.  -     STG 2 Progress Not Met  -     STG 3 Pt is able to compete AROM trunk WFLs with minimal pain increase.  -     STG 3 Progress Partially Met  -            Long Term Goals    LTG Date to Achieve 04/07/22  -     LTG 1 LLE MMT 4+/5 or better in all planes.  -     LTG 1 Progress Not Met  -     LTG 2 Pt will note centralization of radicular symptoms not past L buttox.  -     LTG 2 Progress Not Met  -     LTG 2 Progress Comments no change in radicular symptoms  -     LTG 3 LEFS improved to 40/80 or better.  -     LTG 3 Progress Ongoing  -            Time Calculation    PT Goal Re-Cert Due Date 03/24/22  -           User Key  (r) = Recorded By, (t) = Taken By, (c) =  Cosigned By    Initials Name Provider Type    AC Suzette Teixeira, PT Physical Therapist                               Time Calculation:   Start Time: 1434  Stop Time: 1513  Time Calculation (min): 39 min  Timed Charges  36524 - PT Therapeutic Exercise Minutes: 29  08530 - PT Manual Therapy Minutes: 10  Total Minutes  Timed Charges Total Minutes: 39   Total Minutes: 39  Therapy Charges for Today     Code Description Service Date Service Provider Modifiers Qty    60103730798 HC PT THER PROC EA 15 MIN 3/3/2022 Suzette Teixeira, PT GP 2    96110043992 HC PT MANUAL THERAPY EA 15 MIN 3/3/2022 Suzette Teixeira, PT GP 1                    Suzette Teixeira, PT  3/3/2022

## 2022-03-08 ENCOUNTER — HOSPITAL ENCOUNTER (OUTPATIENT)
Dept: PHYSICAL THERAPY | Facility: HOSPITAL | Age: 62
Setting detail: THERAPIES SERIES
Discharge: HOME | End: 2022-03-08

## 2022-03-08 DIAGNOSIS — M79.606 PAIN OF LOWER EXTREMITY, UNSPECIFIED LATERALITY: Primary | ICD-10-CM

## 2022-03-08 PROCEDURE — 97110 THERAPEUTIC EXERCISES: CPT

## 2022-03-08 NOTE — THERAPY TREATMENT NOTE
Outpatient Physical Therapy Ortho Treatment Note  [facility]     Patient Name: Jeff Werner  : 1960  MRN: [id number]  Today's Date: 3/8/2022      Visit Date: 2022     ATTENDANCE:   SUBJECTIVE IMPROVEMENT: minimal  NEXT MD APPOINTMENT: [facility]  RECERT DATE: 3/24/2022    THERAPY DIAGNOSIS: LLE numbness/radiating pain       Visit Dx:    ICD-10-CM ICD-9-CM   1. Pain of lower extremity, unspecified laterality  M79.606 729.5       Patient Active Problem List   Diagnosis   • Right knee pain   • Degenerative disc disease, lumbar   • Internal derangement of right knee   • Chronic midline low back pain without sciatica   • Mechanical pain of right knee   • Numbness and tingling of left leg   • COPD exacerbation ([facility])   • Acute respiratory failure with hypoxia ([facility])   • Hypertension   • Elevated cholesterol   • Tobacco abuse   • Hypokalemia   • Witnessed episode of apnea   • Sleep disturbances   • UMU (obstructive sleep apnea)Suspected   • COPD, severe ([facility])   • Nicotine dependence with nicotine-induced disorder        Past Medical History:   Diagnosis Date   • Arthritis of back    • Asthma    • COPD (chronic obstructive pulmonary disease) ([facility])    • Degenerative disc disease, lumbar    • Elevated cholesterol    • Hypertension    • Lumbosacral disc disease     history of disc hernations in the lumbar spine   • Type A influenza 2020        Past Surgical History:   Procedure Laterality Date   • COLONOSCOPY N/A 2018    Procedure: COLONOSCOPY;  Surgeon: Jovanny Landis DO;  Location: [facility] ENDOSCOPY;  Service: Gastroenterology        PT Ortho     Row Name 22 1300       Subjective Comments    Subjective Comments Pt notes that leg is about the same, shooting pain comes and goes. Notes that numbness does feel a bit better today, however has not tried the prone extension activities for HEP per report. Unsure if pain is getting any less frequent or intense.  -AC       Precautions and  Contraindications    Precautions/Limitations no known precautions/limitations  -          User Key  (r) = Recorded By, (t) = Taken By, (c) = Cosigned By    Initials Name Provider Type    Suzette Noriega, PT Physical Therapist                             PT Assessment/Plan     Row Name 03/08/22 1400          PT Assessment    Assessment Comments treatment tolerated fairly today with focus on prone activity and LE stretching/mobility. He continues to c/o L anterior thigh pain which comes and goes- possibly released to quads muscle tightness. numbness remain throughout anter/lateral upper leg as well. Pt encouraged to complete prone activities throughout next few days with focus on any LE numbness changes.  -            PT Plan    PT Frequency 2x/week  -AC     Predicted Duration of Therapy Intervention (PT) 6-8 weeks  -     PT Plan Comments trial manual STM/trigger point release to quad next.  -           User Key  (r) = Recorded By, (t) = Taken By, (c) = Cosigned By    Initials Name Provider Type    Suzette Noriega, PT Physical Therapist                   OP Exercises     Row Name 03/08/22 1400 03/08/22 1300          Subjective Comments    Subjective Comments -- Pt notes that leg is about the same, shooting pain comes and goes. Notes that numbness does feel a bit better today, however has not tried the prone extension activities for HEP per report. Unsure if pain is getting any less frequent or intense.  -AC            Subjective Pain    Able to rate subjective pain? -- yes  -AC     Pre-Treatment Pain Level -- 0  -AC            Total Minutes    73537 - PT Therapeutic Exercise Minutes 40  -AC --            Exercise 1    Exercise Name 1 -- Pro II- L4  -AC     Time 1 -- 10 min  -AC     Additional Comments -- small rest break at 7:30 due to fatigue and SOB  -AC            Exercise 2    Exercise Name 2 -- standing HS stretch  -AC     Sets 2 -- 2  -AC     Time 2 -- 30s  -AC            Exercise 3     Exercise Name 3 -- incline/gastroc stretch  -AC     Sets 3 -- 2  -AC     Time 3 -- 30s  -AC            Exercise 4    Exercise Name 4 -- prone quad stretch with strap  -AC     Sets 4 -- 2  -AC     Time 4 -- 30s  -AC            Exercise 5    Exercise Name 5 -- prone press ups  -AC     Sets 5 -- 1  -AC     Reps 5 -- 10  -AC     Time 5 -- 5s hold  -AC            Exercise 6    Exercise Name 6 -- prone ball- glute squeezes  -AC     Sets 6 -- 2  -AC     Reps 6 -- 10  -AC     Time 6 -- 5s hold  -AC            Exercise 7    Exercise Name 7 -- prone tband hip IR  -AC     Sets 7 -- 2  -AC     Reps 7 -- 10  -AC     Additional Comments -- RTB  -AC            Exercise 8    Exercise Name 8 -- bridges  -AC     Sets 8 -- 2  -AC     Reps 8 -- 10  -AC            Exercise 9    Exercise Name 9 -- supine piriformis stretch  -AC     Sets 9 -- 2  -AC     Time 9 -- 30s  -AC           User Key  (r) = Recorded By, (t) = Taken By, (c) = Cosigned By    Initials Name Provider Type    AC Suzette Teixeira, PT Physical Therapist                              PT OP Goals     Row Name 03/08/22 1400          PT Short Term Goals    STG Date to Achieve 03/10/22  -     STG 1 Pt is indpt with HEP.  -     STG 1 Progress Met;Ongoing  -     STG 2 PT demos minimal to no BLE flexibility limitations.  -     STG 2 Progress Not Met  -     STG 3 Pt is able to compete AROM trunk WFLs with minimal pain increase.  -     STG 3 Progress Partially Met  -            Long Term Goals    LTG Date to Achieve 04/07/22  -     LTG 1 LLE MMT 4+/5 or better in all planes.  -     LTG 1 Progress Not Met  -     LTG 2 Pt will note centralization of radicular symptoms not past L buttox.  -     LTG 2 Progress Not Met  -     LTG 3 LEFS improved to 40/80 or better.  -     LTG 3 Progress Ongoing  -            Time Calculation    PT Goal Re-Cert Due Date 03/24/22  -           User Key  (r) = Recorded By, (t) = Taken By, (c) = Cosigned By    Initials Name  Provider Type    AC Suzette Teixeira, PT Physical Therapist                               Time Calculation:   Start Time: 1350  Stop Time: 1430  Time Calculation (min): 40 min  Timed Charges  48938 - PT Therapeutic Exercise Minutes: 40  Total Minutes  Timed Charges Total Minutes: 40   Total Minutes: 40  Therapy Charges for Today     Code Description Service Date Service Provider Modifiers Qty    [id number] HC PT THER PROC EA 15 MIN 3/8/2022 Suzette Teixeira, PT GP 3                    Suzette Teixeira, PT  3/8/2022

## 2022-03-10 ENCOUNTER — HOSPITAL ENCOUNTER (OUTPATIENT)
Dept: PHYSICAL THERAPY | Facility: HOSPITAL | Age: 62
Setting detail: THERAPIES SERIES
Discharge: HOME | End: 2022-03-10

## 2022-03-10 DIAGNOSIS — M79.606 PAIN OF LOWER EXTREMITY, UNSPECIFIED LATERALITY: Primary | ICD-10-CM

## 2022-03-10 PROCEDURE — 97110 THERAPEUTIC EXERCISES: CPT

## 2022-03-10 PROCEDURE — 97140 MANUAL THERAPY 1/> REGIONS: CPT

## 2022-03-10 NOTE — THERAPY TREATMENT NOTE
Outpatient Physical Therapy Ortho Treatment Note  [facility]     Patient Name: Jeff Werner  : 1960  MRN: [id number]  Today's Date: 3/10/2022      Visit Date: 03/10/2022    Attendance:   8/10 (20 approved)  Subjective Improvement:   minimal  MD Appt:   TBD  Recheck Due:   3-24-22    Visit Dx:    ICD-10-CM ICD-9-CM   1. Pain of lower extremity, unspecified laterality  M79.606 729.5       Patient Active Problem List   Diagnosis   • Right knee pain   • Degenerative disc disease, lumbar   • Internal derangement of right knee   • Chronic midline low back pain without sciatica   • Mechanical pain of right knee   • Numbness and tingling of left leg   • COPD exacerbation ([facility])   • Acute respiratory failure with hypoxia ([facility])   • Hypertension   • Elevated cholesterol   • Tobacco abuse   • Hypokalemia   • Witnessed episode of apnea   • Sleep disturbances   • UMU (obstructive sleep apnea)Suspected   • COPD, severe ([facility])   • Nicotine dependence with nicotine-induced disorder        Past Medical History:   Diagnosis Date   • Arthritis of back    • Asthma    • COPD (chronic obstructive pulmonary disease) ([facility])    • Degenerative disc disease, lumbar    • Elevated cholesterol    • Hypertension    • Lumbosacral disc disease     history of disc hernations in the lumbar spine   • Type A influenza 2020        Past Surgical History:   Procedure Laterality Date   • COLONOSCOPY N/A 2018    Procedure: COLONOSCOPY;  Surgeon: Jovanny Landis DO;  Location: [facility] ENDOSCOPY;  Service: Gastroenterology        PT Ortho     Row Name 03/10/22 1300       Precautions and Contraindications    Precautions/Limitations no known precautions/limitations  -TM       Subjective Pain    Able to rate subjective pain? yes  -TM    Pre-Treatment Pain Level 0  -TM          User Key  (r) = Recorded By, (t) = Taken By, (c) = Cosigned By    Initials Name Provider Type    TM Cynthia Rivera, PTA Physical Therapy Assistant        "                      PT Assessment/Plan     Row Name 03/10/22 1300          PT Assessment    Assessment Comments Pt did not tolerate prone press ups on hands, so went only to elbows.  Is not really comfortable in prone, but willing to try for centralizing lumbar discs in effort to reduce LE radicular symptoms.  No post therex pain reported.  -TM            PT Plan    PT Frequency 2x/week  -TM     Predicted Duration of Therapy Intervention (PT) 6-8 weeks  -TM     PT Plan Comments Cont prone exercises.  Prone TKE next.  -TM           User Key  (r) = Recorded By, (t) = Taken By, (c) = Cosigned By    Initials Name Provider Type    TM Cynthia Rivera, PTA Physical Therapy Assistant                   OP Exercises     Row Name 03/10/22 1300             Subjective Comments    Subjective Comments Pt reports having pain and numbness in the same area of L LE, but maybe not occurring as frequently.  -TM              Subjective Pain    Able to rate subjective pain? yes  -TM      Pre-Treatment Pain Level 0  -TM              Exercise 1    Exercise Name 1 Pro II- L4  -TM      Time 1 10 min  -TM              Exercise 2    Exercise Name 2 standing HS stretch  -TM      Sets 2 2  -TM      Time 2 30\"  -TM              Exercise 3    Exercise Name 3 incline/gastroc stretch  -TM      Sets 3 2  -TM      Time 3 30\"  -TM              Exercise 4    Exercise Name 4 prone quad stretch with strap  -TM      Sets 4 2  -TM      Time 4 30\"  -TM              Exercise 5    Exercise Name 5 prone press ups on elbows  -TM      Sets 5 1  -TM      Reps 5 10  -TM      Time 5 5\" hold  -TM              Exercise 6    Exercise Name 6 prone glute squeeze  -TM      Sets 6 2  -TM      Reps 6 10  -TM      Time 6 5\" hold  -TM              Exercise 7    Exercise Name 7 prone tband hip IR  -TM      Sets 7 2  -TM      Reps 7 10  -TM      Additional Comments red  -TM              Exercise 8    Exercise Name 8 prone HS curls with ball squeeze  -TM      Sets 8 2  -TM  "     Reps 8 10  -TM              Exercise 9    Exercise Name 9 manual- see flowsheet  -TM            User Key  (r) = Recorded By, (t) = Taken By, (c) = Cosigned By    Initials Name Provider Type     Cynthia Rivera PTA Physical Therapy Assistant                         Manual Rx (last 36 hours)     Manual Treatments     Row Name 03/10/22 1300             Manual Rx 1    Manual Rx 1 Location lumbar paraspinals, QL, L quad  -TM      Manual Rx 1 Type STM/MFR  -TM      Manual Rx 1 Duration 10 min  -TM            User Key  (r) = Recorded By, (t) = Taken By, (c) = Cosigned By    Initials Name Provider Type    TM Cynthia Rivera PTA Physical Therapy Assistant                 PT OP Goals     Row Name 03/10/22 1300          PT Short Term Goals    STG Date to Achieve 03/10/22  -TM     STG 1 Pt is indpt with HEP.  -TM     STG 1 Progress Met;Ongoing  -TM     STG 2 PT demos minimal to no BLE flexability limitations.  -TM     STG 2 Progress Not Met  -TM     STG 3 Pt is able to compete AROM trunk WFLs with minimal pain increase.  -TM     STG 3 Progress Partially Met  -TM            Long Term Goals    LTG Date to Achieve 04/07/22  -TM     LTG 1 LLE MMT 4+/5 or better in all planes.  -TM     LTG 1 Progress Not Met  -TM     LTG 2 Pt will note centralization of radicular symptoms not past L buttox.  -TM     LTG 2 Progress Not Met  -TM     LTG 3 LEFS improved to 40/80 or better.  -TM     LTG 3 Progress Ongoing  -TM           User Key  (r) = Recorded By, (t) = Taken By, (c) = Cosigned By    Initials Name Provider Type    TM Cynthia Rivera PTA Physical Therapy Assistant                               Time Calculation:   Start Time: 1300  Stop Time: 1345  Time Calculation (min): 45 min  Total Timed Code Minutes- PT: 45 minute(s)  Therapy Charges for Today     Code Description Service Date Service Provider Modifiers Qty    12413887441 HC PT MANUAL THERAPY EA 15 MIN 3/10/2022 Cynthia Rivear PTA GP 1    47771836796 HC PT  THER PROC EA 15 MIN 3/10/2022 Cynthia Rivera, PTA GP 2                    Cynthia Rivera, ASHLEY  3/10/2022

## 2022-03-15 ENCOUNTER — APPOINTMENT (OUTPATIENT)
Dept: PHYSICAL THERAPY | Facility: HOSPITAL | Age: 62
End: 2022-03-15

## 2022-03-17 ENCOUNTER — APPOINTMENT (OUTPATIENT)
Dept: PHYSICAL THERAPY | Facility: HOSPITAL | Age: 62
End: 2022-03-17

## 2022-03-24 ENCOUNTER — APPOINTMENT (OUTPATIENT)
Dept: PHYSICAL THERAPY | Facility: HOSPITAL | Age: 62
End: 2022-03-24

## 2022-03-29 ENCOUNTER — HOSPITAL ENCOUNTER (OUTPATIENT)
Dept: PHYSICAL THERAPY | Facility: HOSPITAL | Age: 62
Setting detail: THERAPIES SERIES
Discharge: HOME | End: 2022-03-29

## 2022-03-29 DIAGNOSIS — M79.606 PAIN OF LOWER EXTREMITY, UNSPECIFIED LATERALITY: Primary | ICD-10-CM

## 2022-03-29 PROCEDURE — 97110 THERAPEUTIC EXERCISES: CPT

## 2022-03-29 PROCEDURE — 97140 MANUAL THERAPY 1/> REGIONS: CPT

## 2022-03-29 NOTE — THERAPY TREATMENT NOTE
"    Outpatient Physical Therapy Ortho Treatment Note  [facility]     Patient Name: Jeff Werner  : 1960  MRN: [id number]  Today's Date: 3/29/2022      Visit Date: 2022    Visit Dx:    ICD-10-CM ICD-9-CM   1. Pain of lower extremity, unspecified laterality  M79.606 729.5        Attendance:    (20 approved)  Subjective Improvement:   \"minimal\"  MD Appt:   TBD  Recheck Due:   3-24-22    Patient Active Problem List   Diagnosis   • Right knee pain   • Degenerative disc disease, lumbar   • Internal derangement of right knee   • Chronic midline low back pain without sciatica   • Mechanical pain of right knee   • Numbness and tingling of left leg   • COPD exacerbation ([facility])   • Acute respiratory failure with hypoxia ([facility])   • Hypertension   • Elevated cholesterol   • Tobacco abuse   • Hypokalemia   • Witnessed episode of apnea   • Sleep disturbances   • UMU (obstructive sleep apnea)Suspected   • COPD, severe ([facility])   • Nicotine dependence with nicotine-induced disorder        Past Medical History:   Diagnosis Date   • Arthritis of back    • Asthma    • COPD (chronic obstructive pulmonary disease) ([facility])    • Degenerative disc disease, lumbar    • Elevated cholesterol    • Hypertension    • Lumbosacral disc disease     history of disc hernations in the lumbar spine   • Type A influenza 2020        Past Surgical History:   Procedure Laterality Date   • COLONOSCOPY N/A 2018    Procedure: COLONOSCOPY;  Surgeon: Jovanny Landis DO;  Location: [facility] ENDOSCOPY;  Service: Gastroenterology        PT Ortho     Row Name 22 1300       Subjective Comments    Subjective Comments Patient reports having occasional sharp, stabbing pain down his L LE.  Doesn'e usually last long.  Still has a numb feeling in L LE also.  Had surgical procedure on L UE and hasn't done much for 2 weeks while recovering from that.  -TM       Precautions and Contraindications    Precautions/Limitations no known " "precautions/limitations  -TM       Subjective Pain    Able to rate subjective pain? yes  -TM    Post-Treatment Pain Level 0  -TM          User Key  (r) = Recorded By, (t) = Taken By, (c) = Cosigned By    Initials Name Provider Type     Cynthia Rivera PTA Physical Therapist Assistant                             PT Assessment/Plan     Row Name 03/29/22 1300          PT Assessment    Assessment Comments Pt has sutures in arm from minor surgery and didnb't want to try prone position.  Worked on seated and standing core stabilization today.  Radicular symptoms continue, but maybe less frequent.  -TM            PT Plan    PT Frequency 2x/week  -TM     Predicted Duration of Therapy Intervention (PT) 6-8 weeks  -TM     PT Plan Comments Pt recheck next visit.  -TM           User Key  (r) = Recorded By, (t) = Taken By, (c) = Cosigned By    Initials Name Provider Type     Cynthia Rivera PTA Physical Therapist Assistant                   OP Exercises     Row Name 03/29/22 1300             Subjective Comments    Subjective Comments Patient reports having occasional sharp, stabbing pain down his L LE.  Doesn'e usually last long.  Still has a numb feeling in L LE also.  Had surgical procedure on L UE and hasn't done much for 2 weeks while recovering from that.  -TM              Subjective Pain    Able to rate subjective pain? yes  -TM      Pre-Treatment Pain Level 0  -TM      Post-Treatment Pain Level 0  -TM              Exercise 1    Exercise Name 1 Pro II- L4  -TM      Time 1 8 min  -TM      Additional Comments L 3  -TM              Exercise 2    Exercise Name 2 standing HS stretch  -TM      Sets 2 2  -TM      Time 2 30\"  -TM              Exercise 3    Exercise Name 3 incline/gastroc stretch  -TM      Sets 3 2  -TM      Time 3 30\"  -TM              Exercise 4    Exercise Name 4 bridges  -TM      Sets 4 2  -TM      Reps 4 10  -TM      Time 4 5\"  -TM              Exercise 5    Exercise Name 5 BKLL with trans abs  " -TM      Sets 5 2  -TM      Reps 5 10  -TM              Exercise 6    Exercise Name 6 seated jenna disc PN LAQ  -TM      Sets 6 2  -TM      Reps 6 10  -TM              Exercise 7    Exercise Name 7 seated jenna disc PN march  -TM      Sets 7 2  -TM      Reps 7 10  -TM              Exercise 8    Exercise Name 8 LAQ nerve glides L LE  -TM      Sets 8 3  -TM      Reps 8 10 AP  -TM              Exercise 9    Exercise Name 9 standing PN hip Abd B  -TM      Sets 9 1  -TM      Reps 9 10  -TM              Exercise 10    Exercise Name 10 standing PN hip ext B  -TM      Sets 10 1  -TM      Reps 10 10  -TM              Exercise 11    Exercise Name 11 standing march  -TM      Sets 11 1  -TM      Reps 11 10  -TM            User Key  (r) = Recorded By, (t) = Taken By, (c) = Cosigned By    Initials Name Provider Type     Cynthia Rivera PTA Physical Therapist Assistant                         Manual Rx (last 36 hours)     Manual Treatments     Row Name 03/29/22 1500             Manual Rx 1    Manual Rx 1 Location lumbar paraspinals, QL  -TM      Manual Rx 1 Type STM/MFR  -TM      Manual Rx 1 Duration 8 min  -TM            User Key  (r) = Recorded By, (t) = Taken By, (c) = Cosigned By    Initials Name Provider Type     Cynthia Rivera PTA Physical Therapist Assistant                 PT OP Goals     Row Name 03/29/22 1300          PT Short Term Goals    STG Date to Achieve 03/10/22  -TM     STG 1 Pt is indpt with HEP.  -TM     STG 1 Progress Met;Ongoing  -TM     STG 2 PT demos minimal to no BLE flexability limitations.  -TM     STG 2 Progress Not Met  -TM     STG 3 Pt is able to compete AROM trunk WFLs with minimal pain increase.  -TM     STG 3 Progress Met  -TM            Long Term Goals    LTG Date to Achieve 04/07/22  -TM     LTG 1 LLE MMT 4+/5 or better in all planes.  -TM     LTG 1 Progress Not Met  -TM     LTG 2 Pt will note centralization of radicular symptoms not past L buttox.  -TM     LTG 2 Progress Not Met   -TM     LTG 3 LEFS improved to 40/80 or better.  -TM     LTG 3 Progress Ongoing  -TM           User Key  (r) = Recorded By, (t) = Taken By, (c) = Cosigned By    Initials Name Provider Type    Cynthia Gordon PTA Physical Therapist Assistant                               Time Calculation:   Start Time: 1345  Stop Time: 1430  Time Calculation (min): 45 min  Total Timed Code Minutes- PT: 45 minute(s)  Therapy Charges for Today     Code Description Service Date Service Provider Modifiers Qty    22622492708 HC PT MANUAL THERAPY EA 15 MIN 3/29/2022 Cynthia Rivera, ASHLEY GP 1    [id number] HC PT THER PROC EA 15 MIN 3/29/2022 Cynthia Rivera PTA GP 2                    Cynthia Rivera PTA  3/29/2022

## 2022-03-31 ENCOUNTER — HOSPITAL ENCOUNTER (OUTPATIENT)
Dept: PHYSICAL THERAPY | Facility: HOSPITAL | Age: 62
Setting detail: THERAPIES SERIES
Discharge: HOME | End: 2022-03-31

## 2022-03-31 DIAGNOSIS — M79.606 PAIN OF LOWER EXTREMITY, UNSPECIFIED LATERALITY: Primary | ICD-10-CM

## 2022-03-31 PROCEDURE — 97110 THERAPEUTIC EXERCISES: CPT

## 2022-04-05 ENCOUNTER — APPOINTMENT (OUTPATIENT)
Dept: PHYSICAL THERAPY | Facility: HOSPITAL | Age: 62
End: 2022-04-05

## 2022-04-07 ENCOUNTER — APPOINTMENT (OUTPATIENT)
Dept: PHYSICAL THERAPY | Facility: HOSPITAL | Age: 62
End: 2022-04-07

## 2022-05-06 RX ORDER — TIOTROPIUM BROMIDE INHALATION SPRAY 3.12 UG/1
2 SPRAY, METERED RESPIRATORY (INHALATION)
Qty: 4 G | Refills: 3 | Status: SHIPPED | OUTPATIENT
Start: 2022-05-06 | End: 2022-08-30 | Stop reason: SDUPTHER

## 2022-08-30 RX ORDER — TIOTROPIUM BROMIDE INHALATION SPRAY 3.12 UG/1
2 SPRAY, METERED RESPIRATORY (INHALATION)
Qty: 4 G | Refills: 0 | Status: SHIPPED | OUTPATIENT
Start: 2022-08-30 | End: 2022-08-31 | Stop reason: SDUPTHER

## 2022-08-31 RX ORDER — TIOTROPIUM BROMIDE INHALATION SPRAY 3.12 UG/1
2 SPRAY, METERED RESPIRATORY (INHALATION)
Qty: 4 G | Refills: 0 | Status: SHIPPED | OUTPATIENT
Start: 2022-08-31 | End: 2022-10-28 | Stop reason: SDUPTHER

## 2022-10-28 RX ORDER — TIOTROPIUM BROMIDE INHALATION SPRAY 3.12 UG/1
2 SPRAY, METERED RESPIRATORY (INHALATION)
Qty: 4 G | Refills: 0 | Status: SHIPPED | OUTPATIENT
Start: 2022-10-28

## 2023-04-21 NOTE — THERAPY TREATMENT NOTE
Outpatient Physical Therapy Ortho Treatment Note  [facility]     Patient Name: Jeff Werner  : 1960  MRN: [id number]  Today's Date: 2022      Visit Date: 2022    Attendance:   7/10 of 20 Approved  Subjective Improvement:   None yet  MD Appt:   prn  Recheck Due:   3-03-22    Visit Dx:    ICD-10-CM ICD-9-CM   1. Pain of lower extremity, unspecified laterality  M79.606 729.5       Patient Active Problem List   Diagnosis   • Right knee pain   • Degenerative disc disease, lumbar   • Internal derangement of right knee   • Chronic midline low back pain without sciatica   • Mechanical pain of right knee   • Numbness and tingling of left leg   • COPD exacerbation ([facility])   • Acute respiratory failure with hypoxia ([facility])   • Hypertension   • Elevated cholesterol   • Tobacco abuse   • Hypokalemia   • Witnessed episode of apnea   • Sleep disturbances   • UMU (obstructive sleep apnea)Suspected   • COPD, severe ([facility])   • Nicotine dependence with nicotine-induced disorder        Past Medical History:   Diagnosis Date   • Arthritis of back    • Asthma    • COPD (chronic obstructive pulmonary disease) ([facility])    • Degenerative disc disease, lumbar    • Elevated cholesterol    • Hypertension    • Lumbosacral disc disease     history of disc hernations in the lumbar spine   • Type A influenza 2020        Past Surgical History:   Procedure Laterality Date   • COLONOSCOPY N/A 2018    Procedure: COLONOSCOPY;  Surgeon: Jovanny Landis DO;  Location: [facility] ENDOSCOPY;  Service: Gastroenterology        PT Ortho     Row Name 22 1500       Subjective Comments    Subjective Comments No pain increase after last traction treatment, but no change in radicular symptoms, still comes and goes.  -TM       Precautions and Contraindications    Precautions/Limitations no known precautions/limitations  -TM       Subjective Pain    Able to rate subjective pain? yes  -TM    Pre-Treatment Pain Level 5  -TM     "Post-Treatment Pain Level 0  -TM          User Key  (r) = Recorded By, (t) = Taken By, (c) = Cosigned By    Initials Name Provider Type     Cynthia Rivera PTA Physical Therapy Assistant                             PT Assessment/Plan     Row Name 02/21/22 1500          PT Assessment    Assessment Comments Pt improving with trans ab contraction and not holding breath.  Tolerated new therex well.  -TM            PT Plan    PT Frequency 2x/week  -TM     Predicted Duration of Therapy Intervention (PT) 6-8 weeks  -TM     PT Plan Comments Increase traction weight to 85#.  Add seated repeated flexion.  -TM           User Key  (r) = Recorded By, (t) = Taken By, (c) = Cosigned By    Initials Name Provider Type     Cynthia Rivera PTA Physical Therapy Assistant                 Modalities     Row Name 02/21/22 1500             Traction 60358    Traction Type Lumbar  -TM      PT Traction Rx Minutes 15  -TM      Duration Intermittent  -TM      Position Hook-lying  -TM      Weight 80  -TM      Hold 60  -TM      Relax 10  -TM      Progression 2  -TM      Regression 2  -TM            User Key  (r) = Recorded By, (t) = Taken By, (c) = Cosigned By    Initials Name Provider Type     Cynthia Rivera PTA Physical Therapy Assistant               OP Exercises     Row Name 02/21/22 1500             Subjective Comments    Subjective Comments No pain increase after last traction treatment, but no change in radicular symptoms, still comes and goes.  -TM              Subjective Pain    Able to rate subjective pain? yes  -TM      Pre-Treatment Pain Level 5  -TM      Post-Treatment Pain Level 0  -TM              Exercise 1    Exercise Name 1 PRO II ROM  -TM      Time 1 8 min  -TM      Additional Comments L 3  -TM              Exercise 2    Exercise Name 2 standing B HS stretch  -TM      Sets 2 2  -TM      Time 2 30\"  -TM              Exercise 3    Exercise Name 3 supine piriformis stretch  -TM      Sets 3 2  -TM      Time 3 " "30\"  -TM              Exercise 4    Exercise Name 4 supine LTR  -TM      Sets 4 1  -TM      Reps 4 5  -TM      Time 4 10\" hold  -TM              Exercise 5    Exercise Name 5 isometric trans abs  -TM      Sets 5 1  -TM      Reps 5 15  -TM      Time 5 5\"  -TM              Exercise 6    Exercise Name 6 hip Add squeeze  -TM      Sets 6 1  -TM      Reps 6 15  -TM      Time 6 5\"  -TM              Exercise 7    Exercise Name 7 BKLL with trans abs  -TM      Sets 7 1  -TM      Reps 7 15  -TM              Exercise 8    Exercise Name 8 traction: see flowsheet  -TM            User Key  (r) = Recorded By, (t) = Taken By, (c) = Cosigned By    Initials Name Provider Type     Cynthia Rivera PTA Physical Therapy Assistant                              PT OP Goals     Row Name 02/21/22 1500          PT Short Term Goals    STG Date to Achieve 03/10/22  -TM     STG 1 Pt is indpt with HEP.  -TM     STG 1 Progress Progressing  -TM     STG 2 PT demos minimal to no BLE flexability limitations.  -TM     STG 2 Progress Ongoing  -TM     STG 3 Pt is able to compete AROM trunk WFLs with minimal pain increase.  -TM     STG 3 Progress Ongoing  -TM            Long Term Goals    LTG Date to Achieve 04/07/22  -TM     LTG 1 LLE MMT 4+/5 or better in all planes.  -TM     LTG 1 Progress Ongoing  -TM     LTG 2 Pt will note centralization of radicular symptoms not past L buttox.  -TM     LTG 2 Progress Ongoing  -TM     LTG 3 LEFS improved to 40/80 or better.  -TM     LTG 3 Progress Ongoing  -TM           User Key  (r) = Recorded By, (t) = Taken By, (c) = Cosigned By    Initials Name Provider Type     Cynthia Rivera PTA Physical Therapy Assistant                               Time Calculation:   Start Time: 1515  Stop Time: 1618  Time Calculation (min): 63 min  Total Timed Code Minutes- PT: 48 minute(s)  Untimed Charges  PT Traction Rx Minutes: 15  Total Minutes  Untimed Charges Total Minutes: 15   Total Minutes: 15  Therapy Charges for " Today     Code Description Service Date Service Provider Modifiers Qty    27041387725 HC PT THER PROC EA 15 MIN 2/21/2022 Cynthia Rivera, PTA GP 3    [id number] HC PT-TRACTION MECHANICAL 2/21/2022 Cynthia Rivera, PTA  1                    Cynthia Rivera, PTA  2/21/2022      There are no Wet Read(s) to document.

## 2023-05-31 ENCOUNTER — HOSPITAL ENCOUNTER (OUTPATIENT)
Dept: CT IMAGING | Facility: HOSPITAL | Age: 63
Discharge: HOME | End: 2023-05-31
Admitting: NURSE PRACTITIONER

## 2023-05-31 DIAGNOSIS — Z12.9 SCREENING FOR CANCER: ICD-10-CM

## 2023-05-31 PROCEDURE — 71271 CT THORAX LUNG CANCER SCR C-: CPT

## 2023-06-15 ENCOUNTER — PREP FOR SURGERY (OUTPATIENT)
Dept: OTHER | Facility: HOSPITAL | Age: 63
End: 2023-06-15
Payer: COMMERCIAL

## 2023-06-15 DIAGNOSIS — Z86.010 PERSONAL HISTORY OF COLONIC POLYPS: Primary | ICD-10-CM

## 2023-06-15 RX ORDER — SODIUM CHLORIDE 9 MG/ML
40 INJECTION, SOLUTION INTRAVENOUS AS NEEDED
OUTPATIENT
Start: 2023-06-15

## 2023-06-15 RX ORDER — DEXTROSE AND SODIUM CHLORIDE 5; .45 G/100ML; G/100ML
30 INJECTION, SOLUTION INTRAVENOUS CONTINUOUS PRN
OUTPATIENT
Start: 2023-06-15

## 2023-06-19 PROBLEM — Z86.010 PERSONAL HISTORY OF COLONIC POLYPS: Status: ACTIVE | Noted: 2023-06-19

## (undated) DEVICE — SINGLE-USE BIOPSY FORCEPS: Brand: RADIAL JAW 4

## (undated) DEVICE — TRAP SXN POLYP QUICKCATCH LF

## (undated) DEVICE — Device: Brand: DISPOSABLE ELECTROSURGICAL SNARE

## (undated) DEVICE — CANN SMPL SOFTECH BIFLO ETCO2 A/M 7FT